# Patient Record
Sex: FEMALE | Race: WHITE | Employment: UNEMPLOYED | URBAN - METROPOLITAN AREA
[De-identification: names, ages, dates, MRNs, and addresses within clinical notes are randomized per-mention and may not be internally consistent; named-entity substitution may affect disease eponyms.]

---

## 2017-06-23 ENCOUNTER — DOCUMENTATION ONLY (OUTPATIENT)
Dept: SURGERY | Age: 57
End: 2017-06-23

## 2017-06-23 NOTE — PROGRESS NOTES
Per [facility] requirements;  E-mail and letter sent for follow up appointment. [facility] Loss [address] [facility]  [facility]      Dear Shira Vincent,  You have not followed up with your bariatric office since 1/29/15. Your health is our main concern. It is important for your health to have follow- up lab work and to see you surgeon at 3 months, 6 months and annually after your weight loss surgery. Additionally, the Department of bariatric Surgery at our hospital is a member of the [employer] [address] Surgical Quality Improvement Program ([facility] NSQIP). As a participant in this program, we gather information on the outcomes of our patients after surgery. Please call the office for a follow up appointment at [phone]. If you have moved out of the area or have changed surgeons please call us and let us know the name of your doctor. Your health and feedback are important to us. We greatly appreciate your response.        Thank you,  [facility] [address] [facility]

## 2017-12-27 ENCOUNTER — DOCUMENTATION ONLY (OUTPATIENT)
Dept: SURGERY | Age: 57
End: 2017-12-27

## 2017-12-27 NOTE — PROGRESS NOTES
Per [facility] requirements;  E-mail and letter sent for follow up appointment. [facility] Emily Foster [address]      Dear Anais Castelan,  Your health is our main concern. It is important for your health to have follow-up lab work and to see you surgeon at 3 months, 6 months and annually after your weight loss surgery. Additionally, the Department of bariatric Surgery at our hospital is a member of the [employer] [address] Surgical Quality Improvement Program ([facility] NSQIP). As a participant in this program, we gather information on the outcomes of our patients after surgery. Please call the office for a follow up appointment at [phone] with [facility] Thee HERNANDEZ PA-C. If you have moved out of the area or have changed surgeons please call us and let us know the name of your doctor. Your health and feedback are important to us. We greatly appreciate your response.        Thank you,  [facility] [address] [facility]

## 2019-03-14 ENCOUNTER — OFFICE VISIT (OUTPATIENT)
Dept: FAMILY MEDICINE CLINIC | Age: 59
End: 2019-03-14

## 2019-03-14 ENCOUNTER — HOSPITAL ENCOUNTER (OUTPATIENT)
Dept: LAB | Age: 59
Discharge: HOME | End: 2019-03-14
Payer: COMMERCIAL

## 2019-03-14 VITALS
DIASTOLIC BLOOD PRESSURE: 78 MMHG | HEART RATE: 74 BPM | HEIGHT: 70 IN | WEIGHT: 201 LBS | BODY MASS INDEX: 28.77 KG/M2 | OXYGEN SATURATION: 98 % | TEMPERATURE: 98.1 F | RESPIRATION RATE: 14 BRPM | SYSTOLIC BLOOD PRESSURE: 124 MMHG

## 2019-03-14 DIAGNOSIS — K21.9 GASTROESOPHAGEAL REFLUX DISEASE WITHOUT ESOPHAGITIS: ICD-10-CM

## 2019-03-14 DIAGNOSIS — I82.401 ACUTE DEEP VEIN THROMBOSIS (DVT) OF RIGHT LOWER EXTREMITY, UNSPECIFIED VEIN (HCC): Primary | ICD-10-CM

## 2019-03-14 DIAGNOSIS — H91.8X3 OTHER SPECIFIED HEARING LOSS OF BOTH EARS: ICD-10-CM

## 2019-03-14 DIAGNOSIS — E03.9 HYPOTHYROIDISM, UNSPECIFIED TYPE: ICD-10-CM

## 2019-03-14 DIAGNOSIS — E66.01 MORBID OBESITY (HCC): ICD-10-CM

## 2019-03-14 LAB
ALBUMIN SERPL-MCNC: 4.2 G/DL (ref 3.4–5)
ALBUMIN/GLOB SERPL: 1.4 {RATIO} (ref 0.8–1.7)
ALP SERPL-CCNC: 92 U/L (ref 45–117)
ALT SERPL-CCNC: 23 U/L (ref 13–56)
ANION GAP SERPL CALC-SCNC: 6 MMOL/L (ref 3–18)
AST SERPL-CCNC: 18 U/L (ref 15–37)
BASOPHILS # BLD: 0 K/UL (ref 0–0.1)
BASOPHILS NFR BLD: 1 % (ref 0–2)
BILIRUB SERPL-MCNC: 0.4 MG/DL (ref 0.2–1)
BUN SERPL-MCNC: 13 MG/DL (ref 7–18)
BUN/CREAT SERPL: 15 (ref 12–20)
CALCIUM SERPL-MCNC: 8.9 MG/DL (ref 8.5–10.1)
CHLORIDE SERPL-SCNC: 106 MMOL/L (ref 100–108)
CHOLEST SERPL-MCNC: 189 MG/DL
CO2 SERPL-SCNC: 28 MMOL/L (ref 21–32)
CREAT SERPL-MCNC: 0.86 MG/DL (ref 0.6–1.3)
DIFFERENTIAL METHOD BLD: ABNORMAL
EOSINOPHIL # BLD: 0.3 K/UL (ref 0–0.4)
EOSINOPHIL NFR BLD: 4 % (ref 0–5)
ERYTHROCYTE [DISTWIDTH] IN BLOOD BY AUTOMATED COUNT: 14.9 % (ref 11.6–14.5)
GLOBULIN SER CALC-MCNC: 3 G/DL (ref 2–4)
GLUCOSE SERPL-MCNC: 86 MG/DL (ref 74–99)
HCT VFR BLD AUTO: 45.8 % (ref 35–45)
HDLC SERPL-MCNC: 71 MG/DL (ref 40–60)
HDLC SERPL: 2.7 {RATIO} (ref 0–5)
HGB BLD-MCNC: 15.2 G/DL (ref 12–16)
LDLC SERPL CALC-MCNC: 101.2 MG/DL (ref 0–100)
LIPID PROFILE,FLP: ABNORMAL
LYMPHOCYTES # BLD: 2.4 K/UL (ref 0.9–3.6)
LYMPHOCYTES NFR BLD: 36 % (ref 21–52)
MCH RBC QN AUTO: 31.3 PG (ref 24–34)
MCHC RBC AUTO-ENTMCNC: 33.2 G/DL (ref 31–37)
MCV RBC AUTO: 94.2 FL (ref 74–97)
MONOCYTES # BLD: 0.5 K/UL (ref 0.05–1.2)
MONOCYTES NFR BLD: 7 % (ref 3–10)
NEUTS SEG # BLD: 3.5 K/UL (ref 1.8–8)
NEUTS SEG NFR BLD: 52 % (ref 40–73)
PLATELET # BLD AUTO: 211 K/UL (ref 135–420)
PMV BLD AUTO: 11.3 FL (ref 9.2–11.8)
POTASSIUM SERPL-SCNC: 4.4 MMOL/L (ref 3.5–5.5)
PROT SERPL-MCNC: 7.2 G/DL (ref 6.4–8.2)
RBC # BLD AUTO: 4.86 M/UL (ref 4.2–5.3)
SODIUM SERPL-SCNC: 140 MMOL/L (ref 136–145)
T4 FREE SERPL-MCNC: 0.9 NG/DL (ref 0.7–1.5)
TRIGL SERPL-MCNC: 84 MG/DL (ref ?–150)
TSH SERPL DL<=0.05 MIU/L-ACNC: 5.5 UIU/ML (ref 0.36–3.74)
VLDLC SERPL CALC-MCNC: 16.8 MG/DL
WBC # BLD AUTO: 6.7 K/UL (ref 4.6–13.2)

## 2019-03-14 PROCEDURE — 80061 LIPID PANEL: CPT

## 2019-03-14 PROCEDURE — 80053 COMPREHEN METABOLIC PANEL: CPT

## 2019-03-14 PROCEDURE — 85025 COMPLETE CBC W/AUTO DIFF WBC: CPT

## 2019-03-14 PROCEDURE — 84439 ASSAY OF FREE THYROXINE: CPT

## 2019-03-14 RX ORDER — METOPROLOL TARTRATE 25 MG/1
25 TABLET, FILM COATED ORAL
COMMUNITY
Start: 2016-12-19 | End: 2019-03-19 | Stop reason: SDUPTHER

## 2019-03-14 RX ORDER — ZOLPIDEM TARTRATE 10 MG/1
10 TABLET ORAL
COMMUNITY
End: 2019-04-05

## 2019-03-14 NOTE — PROGRESS NOTES
New patient here for medication refills she has been completely out of her medications for a while now, she state she also has issues with anxiety, has had some dizziness and c/o heart palpitations. Medication reconciliation has been completed with patient. Care team discussed/updated as well as pharmacy. Health Maintenance reviewed - Pending previous records review.

## 2019-03-14 NOTE — PATIENT INSTRUCTIONS
Deep Vein Thrombosis: Care Instructions  Your Care Instructions    A deep vein thrombosis (DVT) is a blood clot in certain veins of the legs, pelvis, or arms. Blood clots in these veins need to be treated because they can get bigger, break loose, and travel through the bloodstream to the lungs. A blood clot in a lung can be life-threatening. The doctor may have given you a blood thinner (anticoagulant). A blood thinner can stop the blood clot from growing larger and prevent new clots from forming. You will need to take a blood thinner for 3 to 6 months or longer. The doctor has checked you carefully, but problems can develop later. If you notice any problems or new symptoms, get medical treatment right away. Follow-up care is a key part of your treatment and safety. Be sure to make and go to all appointments, and call your doctor if you are having problems. It's also a good idea to know your test results and keep a list of the medicines you take. How can you care for yourself at home? · Take your medicines exactly as prescribed. Call your doctor if you think you are having a problem with your medicine. · If you are taking a blood thinner, be sure you get instructions about how to take your medicine safely. Blood thinners can cause serious bleeding problems. · Wear compression stockings if your doctor recommends them. These stockings are tighter at the feet than on the legs. They may reduce pain and swelling in your legs. But there are different types of stockings, and they need to fit right. So your doctor will recommend what you need. · When you sit, use a pillow to raise the arm or leg that has the blood clot. Try to keep it above the level of your heart. When should you call for help? Call 911 anytime you think you may need emergency care. For example, call if:    · You passed out (lost consciousness).     · You have symptoms of a blood clot in your lung (called a pulmonary embolism).  These include:  ? Sudden chest pain. ? Trouble breathing. ? Coughing up blood.    Call your doctor now or seek immediate medical care if:    · You have new or worse trouble breathing.     · You are dizzy or lightheaded, or you feel like you may faint.     · You have symptoms of a blood clot in your arm or leg. These may include:  ? Pain in the arm, calf, back of the knee, thigh, or groin. ? Redness and swelling in the arm, leg, or groin.    Watch closely for changes in your health, and be sure to contact your doctor if:    · You do not get better as expected. Where can you learn more? Go to http://brian-cindy.info/. Enter V044 in the search box to learn more about \"Deep Vein Thrombosis: Care Instructions. \"  Current as of: September 26, 2018  Content Version: 11.9  © 3779-9580 [employer]. Care instructions adapted under license by [employer] (which disclaims liability or warranty for this information). If you have questions about a medical condition or this instruction, always ask your healthcare professional. [address] any warranty or liability for your use of this information.

## 2019-03-14 NOTE — PROGRESS NOTES
Rosenda Vernon is a 63 y.o. female  presents for establish care. She has some hearing loss and wants to be checked    No Known Allergies  Outpatient Medications Marked as Taking for the 3/14/19 encounter (Office Visit) with Rebeca Beltran MD   Medication Sig Dispense Refill    thiamine HCl (VITAMIN B-1 PO) Take  by mouth.  cyanocobalamin (VITAMIN B-12) 500 mcg tablet Take 500 mcg by mouth daily.  multivitamin (ONE A DAY) tablet Take 1 Tab by mouth two (2) times a day.  calcium citrate-vitamin D3 (UPCAL D) 500 mg-500 unit /5 gram powd Take 500 mg by mouth three (3) times daily.  BIOTIN PO Take  by mouth.  Cholecalciferol, Vitamin D3, (VITAMIN D3) 1,000 unit chew Take 2,000 Units by mouth daily.        Patient Active Problem List   Diagnosis Code    Hypothyroidism E03.9    DJD (degenerative joint disease) M19.90    DVT (deep venous thrombosis) ([facility]) I82.409    Morbid obesity ([facility] [address] E66.01    HTN (hypertension) I10    FAINA (stress urinary incontinence, female) N39.3    GERD (gastroesophageal reflux disease) K21.9    CAMDEN on CPAP G47.33, Z99.89     Past Medical History:   Diagnosis Date    Arthritis     Chronic obstructive pulmonary disease (HCC)     hx pe    Coagulation disorder (HCC)     clotting disorder    Hx of colonic polyps     HX OTHER MEDICAL     pulmonary embolism x 2    HX OTHER MEDICAL     PCOS    HX OTHER MEDICAL     Heart monitor 1/2015     Hypertension     no longer on meds    Long term (current) use of anticoagulants     Osteopenia     PE (pulmonary thromboembolism) ([address]     Plantar fasciitis of left foot     Tendinopathy Oct 2013    Right insertional Achilles tendinopathy    Thromboembolus ([address]     blood clot in left leg then lung in 2007, then clot behind right knee 2009    Thromboembolus [facility])     also PE    Thyroid disease      Social History     Socioeconomic History    Marital status:      Spouse name: Not on file    Number of children: Not on file    Years of education: Not on file    Highest education level: Not on file   Tobacco Use    Smoking status: Current Every Day Smoker     Packs/day: 0.50     Years: 10.00     Pack years: 5.00     Types: Cigarettes     Last attempt to quit: 2013     Years since quittin.6    Smokeless tobacco: Never Used    Tobacco comment: vapor pen   Substance and Sexual Activity    Alcohol use: No     Alcohol/week: 2.0 oz     Types: 2 Glasses of wine, 2 Standard drinks or equivalent per week     Comment: once weekly    Drug use: No     Family History   Problem Relation Age of Onset    Diabetes Other     Hypertension Other     Heart Disease Other     Arthritis-osteo Other         Review of Systems   Constitutional: Negative for chills, fever, malaise/fatigue and weight loss. HENT: Positive for hearing loss. Eyes: Negative for blurred vision. Respiratory: Negative for shortness of breath and wheezing. Cardiovascular: Negative for chest pain. Gastrointestinal: Negative for nausea and vomiting. Musculoskeletal: Negative for myalgias. Skin: Negative for rash. Neurological: Negative for weakness. Psychiatric/Behavioral: Negative for depression, hallucinations, memory loss, substance abuse and suicidal ideas. The patient is not nervous/anxious and does not have insomnia. Vitals:    19 1415   BP: 124/78   Pulse: 74   Resp: 14   Temp: 98.1 °F (36.7 °C)   TempSrc: Oral   SpO2: 98%   Weight: 201 lb (91.2 kg)   Height: 5' 10\" (1.778 m)   PainSc:   2   PainLoc: Neck   LMP: 2013       Physical Exam   Constitutional: She is oriented to person, place, and time and well-developed, well-nourished, and in no distress. Eyes: Conjunctivae are normal. Pupils are equal, round, and reactive to light. Neck: Normal range of motion. Neck supple. No thyromegaly present. Cardiovascular: Normal rate, regular rhythm and normal heart sounds.    Pulmonary/Chest: Effort normal and breath sounds normal.   Musculoskeletal: Normal range of motion. Neurological: She is alert and oriented to person, place, and time. Skin: Skin is warm and dry. Psychiatric: Mood, memory, affect and judgment normal.   Nursing note and vitals reviewed. Assessment/Plan      ICD-10-CM ICD-9-CM    1. Acute deep vein thrombosis (DVT) of right lower extremity, unspecified vein ([facility]) I82.401 453.40 rivaroxaban (XARELTO) 20 mg tab tablet   2. Hypothyroidism, unspecified type E03.9 244.9 TSH AND FREE T4      LIPID PANEL   3. Morbid obesity ([facility]) E66.01 278.01    4. Gastroesophageal reflux disease without esophagitis T89.4 355.56 METABOLIC PANEL, COMPREHENSIVE      CBC WITH AUTOMATED DIFF   5. Other specified hearing loss of both ears H91.8X3 389.8 REFERRAL TO AUDIOLOGY     I have discussed the diagnosis with the patient and the intended plan of care as seen in the above orders. The patient has received an after-visit summary and questions were answered concerning future plans. I have discussed medication, side effects, and warnings with the patient in detail. The patient verbalized understanding and is in agreement with the plan of care. The patient will contact the office with any additional concerns. Follow-up Disposition:  Return in about 3 months (around 6/14/2019).   lab results and schedule of future lab studies reviewed with patient    Iram Pierce MD

## 2019-03-19 DIAGNOSIS — F51.01 PRIMARY INSOMNIA: ICD-10-CM

## 2019-03-19 DIAGNOSIS — F41.9 ANXIETY: Primary | ICD-10-CM

## 2019-03-19 RX ORDER — ZOLPIDEM TARTRATE 10 MG/1
10 TABLET ORAL
OUTPATIENT
Start: 2019-03-19

## 2019-03-19 RX ORDER — LEVOTHYROXINE SODIUM 137 UG/1
137 TABLET ORAL
Qty: 30 TAB | Refills: 2 | Status: SHIPPED | OUTPATIENT
Start: 2019-03-19 | End: 2019-06-10 | Stop reason: SDUPTHER

## 2019-03-19 RX ORDER — CITALOPRAM 20 MG/1
20 TABLET, FILM COATED ORAL DAILY
Qty: 30 TAB | Refills: 3 | Status: SHIPPED | OUTPATIENT
Start: 2019-03-19 | End: 2019-05-17 | Stop reason: SINTOL

## 2019-03-19 RX ORDER — METOPROLOL TARTRATE 25 MG/1
25 TABLET, FILM COATED ORAL 2 TIMES DAILY
Qty: 60 TAB | Refills: 2 | Status: SHIPPED | OUTPATIENT
Start: 2019-03-19 | End: 2019-04-05 | Stop reason: SINTOL

## 2019-03-19 NOTE — TELEPHONE ENCOUNTER
Patient is asking for refills on her medications which have been pended, she was seen in the office on 3/14/19 as a new patient. Please advise.

## 2019-03-19 NOTE — TELEPHONE ENCOUNTER
Mrs. Price Ao called requesting refills on her current medications. She states she discussed these refills while here for her new pt appointment. She asked Dr. Issac Baugh to send them in. 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 She also wants her lab results, she needs to be on thyroid medicine, but Dr. Issac Baugh has to review the lab results first.     Her xaralto needs prior authorization (see other msg with this encounter). If that can't be done in a timely manner, then she needs her coumadin sent in.  called as well, we were unable to discuss anything with him because he wasn't on the paperwork. Patient made aware that  has to be added.

## 2019-03-19 NOTE — TELEPHONE ENCOUNTER
Fax received from [address] [place] [address] meds stating prior [place] Door is required for the Fa[place]. Submit a PA request  1. Go to key. [email or web address] and click \"Enter a Key\"  2. Patient last name: Silvana Murphy      : 1960      Key: [id number]  3.  Click \"start a PA\", complete the form, and \"send to plan\"

## 2019-03-19 NOTE — TELEPHONE ENCOUNTER
Called to let pt know that Dr. Chris Jones [place] [address] for anxiety. He also filled her synthroid and metoprolol. PA was submitted and approved for Xarelto. He did not fill Ambien. He does not fill this medication and has done a referral to sleep medicine. I was unable to reach pt at either number on file.

## 2019-03-19 NOTE — TELEPHONE ENCOUNTER
Franco Robles Key: [id number] - PA Case ID: [id number] - Rx #: [id number] Need help? Outcome  Approved today  [id number];    Status:Approved; Review     Type:Prior Auth; Coverage Start     Date:02/17/2019; Coverage End Date:03/18/2020;

## 2019-03-26 ENCOUNTER — OFFICE VISIT (OUTPATIENT)
Dept: FAMILY MEDICINE CLINIC | Age: 59
End: 2019-03-26

## 2019-03-26 VITALS
DIASTOLIC BLOOD PRESSURE: 72 MMHG | RESPIRATION RATE: 12 BRPM | HEART RATE: 60 BPM | OXYGEN SATURATION: 99 % | SYSTOLIC BLOOD PRESSURE: 116 MMHG | HEIGHT: 70 IN | BODY MASS INDEX: 29.06 KG/M2 | WEIGHT: 203 LBS

## 2019-03-26 DIAGNOSIS — R00.1 BRADYCARDIA: Primary | ICD-10-CM

## 2019-03-26 NOTE — PATIENT INSTRUCTIONS
Bradycardia: Care Instructions  Your Care Instructions    Bradycardia is a slow heart rate. If your heart beats too slowly, it can't supply your body with enough blood. This can make you weak or dizzy. Or it may make you pass out. Sometimes medicine can cause this problem. If this happens, your doctor may have you adjust one of your medicines. If a medicine is not the problem, your doctor may recommend a pacemaker. It is important to treat bradycardia so that you don't get more serious health problems. Your doctor will want to see you on a routine schedule to make sure that your heartbeat is normal.  Follow-up care is a key part of your treatment and safety. Be sure to make and go to all appointments, and call your doctor if you are having problems. It's also a good idea to know your test results and keep a list of the medicines you take. How can you care for yourself at home? · Take your medicines exactly as prescribed. Call your doctor if you think you are having a problem with your medicine. If your bradycardia is caused by another disease, your doctor will try to treat the disease. If it is caused by heart medicines, he or she will adjust your medicines. · Make lifestyle changes to improve your heart health. ? Get regular exercise. Try for 30 minutes on most days of the week. If you do not have other heart problems, you likely do not have limits on the type or level of activity that you can do. You may want to walk, swim, bike, or do other activities. Ask your doctor what level of exercise is safe for you. ? To control your cholesterol, avoid foods with a lot of fat, saturated fat, or sodium. Try to eat more fiber. And if your doctor says it's okay, get some exercise on most days. ? Do not smoke. Smoking can make your heart condition worse. If you need help quitting, talk to your doctor about stop-smoking programs and medicines. These can increase your chances of quitting for good. ?  Limit alcohol to 2 drinks a day for men and 1 drink a day for women. Too much alcohol can cause health problems. Pacemaker  If you have a pacemaker, you will get more specific information about it. Be sure to:  · Check your pulse as your doctor tells you. · Have your pacemaker checked as often as your doctor recommends. You may be able to do this over the phone or computer. · Avoid strong magnetic or electrical fields. These include MRIs, welding equipment, and generators. · You will be checked several times right after you get your pacemaker and when it is time to have the battery changed. Batteries last for 5 to 15 years. · You can talk on a cell phone. But keep it 6 inches away from your pacemaker. · Microwaves, TVs, radios, and kitchen and bathroom appliances won't harm you. When should you call for help? Call 911 anytime you think you may need emergency care. For example, call if:    · You have symptoms of sudden heart failure. These may include:  ? Severe trouble breathing. ? A fast or irregular heartbeat. ? Coughing up pink, foamy mucus. ? You passed out.     · You have symptoms of a stroke. These may include:  ? Sudden numbness, tingling, weakness, or loss of movement in your face, arm, or leg, especially on only one side of your body. ? Sudden vision changes. ? Sudden trouble speaking. ? Sudden confusion or trouble understanding simple statements. ? Sudden problems with walking or balance. ? A sudden, severe headache that is different from past headaches.    Call your doctor now or seek immediate medical care if:    · You have new or changed symptoms of heart failure, such as:  ? New or increased shortness of breath. ? New or worse swelling in your legs, ankles, or feet. ? Sudden weight gain, such as more than 2 to 3 pounds in a day or 5 pounds in a week. (Your doctor may suggest a different range of weight gain.)  ? Feeling dizzy or lightheaded or like you may faint. ?  Feeling so tired or weak that you cannot do your usual activities. ? Not sleeping well. Shortness of breath wakes you at night. You need extra pillows to prop yourself up to breathe easier.    Watch closely for changes in your health, and be sure to contact your doctor if:    · You do not get better as expected. Where can you learn more? Go to http://brian-cindy.info/. Enter G945 in the search box to learn more about \"Bradycardia: Care Instructions. \"  Current as of: July 22, 2018  Content Version: 11.9  © 2955-8212 [employer]. Care instructions adapted under license by [employer] (which disclaims liability or warranty for this information). If you have questions about a medical condition or this instruction, always ask your healthcare professional. [address] any warranty or liability for your use of this information.

## 2019-03-26 NOTE — PROGRESS NOTES
Marissa Almonte is a 63 y.o. female  presents for slow heart rate. She was seen by ER. She has assoc dizziness. She had sxs for 2 days. She  States sxs are getting worse. No Known Allergies  Outpatient Medications Marked as Taking for the 3/26/19 encounter (Office Visit) with Evita Waldrop MD   Medication Sig Dispense Refill    levothyroxine (SYNTHROID) 137 mcg tablet Take 137 mcg by mouth Daily (before breakfast). 30 Tab 2    metoprolol tartrate (LOPRESSOR) 25 mg tablet Take 1 Tab by mouth two (2) times a day. 60 Tab 2    citalopram (CELEXA) 20 mg tablet Take 1 Tab by mouth daily. 30 Tab 3    zolpidem (AMBIEN) 10 mg tablet Take 10 mg by mouth.  thiamine HCl (VITAMIN B-1 PO) Take  by mouth.  rivaroxaban (XARELTO) 20 mg tab tablet Take 1 Tab by mouth daily. To follow up with Dr. Gomez Clubs regarding when to stop medication and start Lovenox. 30 Tab 1    cyanocobalamin (VITAMIN B-12) 500 mcg tablet Take 500 mcg by mouth daily.  multivitamin (ONE A DAY) tablet Take 1 Tab by mouth two (2) times a day.  calcium citrate-vitamin D3 (UPCAL D) 500 mg-500 unit /5 gram powd Take 500 mg by mouth three (3) times daily.  BIOTIN PO Take  by mouth.  Cholecalciferol, Vitamin D3, (VITAMIN D3) 1,000 unit chew Take 2,000 Units by mouth daily.        Patient Active Problem List   Diagnosis Code    Hypothyroidism E03.9    DJD (degenerative joint disease) M19.90    DVT (deep venous thrombosis) ([facility]) I82.409    Morbid obesity ([facility] [address] E66.01    HTN (hypertension) I10    FAINA (stress urinary incontinence, female) N39.3    GERD (gastroesophageal reflux disease) K21.9    CAMDEN on CPAP G47.33, Z99.89     Past Medical History:   Diagnosis Date    Arthritis     Chronic obstructive pulmonary disease (HCC)     hx pe    Coagulation disorder (HCC)     clotting disorder    Hx of colonic polyps     HX OTHER MEDICAL     pulmonary embolism x 2    HX OTHER MEDICAL     PCOS    HX OTHER MEDICAL     Heart monitor 2015     Hypertension     no longer on meds    Long term (current) use of anticoagulants     Osteopenia     PE (pulmonary thromboembolism) (HCC)     Plantar fasciitis of left foot     Tendinopathy Oct 2013    Right insertional Achilles tendinopathy    Thromboembolus ([address]     blood clot in left leg then lung in , then clot behind right knee     Thromboembolus [facility])     also PE    Thyroid disease      Social History     Socioeconomic History    Marital status:      Spouse name: Not on file    Number of children: Not on file    Years of education: Not on file    Highest education level: Not on file   Tobacco Use    Smoking status: Current Every Day Smoker     Packs/day: 0.50     Years: 10.00     Pack years: 5.00     Types: Cigarettes     Last attempt to quit: 2013     Years since quittin.6    Smokeless tobacco: Never Used    Tobacco comment: vapor pen   Substance and Sexual Activity    Alcohol use: No     Alcohol/week: 2.0 oz     Types: 2 Glasses of wine, 2 Standard drinks or equivalent per week     Comment: once weekly    Drug use: No     Family History   Problem Relation Age of Onset    Diabetes Other     Hypertension Other     Heart Disease Other     Arthritis-osteo Other         Review of Systems   Constitutional: Negative for chills, fever, malaise/fatigue and weight loss. Eyes: Negative for blurred vision. Respiratory: Negative for shortness of breath and wheezing. Cardiovascular: Negative for chest pain, palpitations, orthopnea, claudication, leg swelling and PND. Gastrointestinal: Negative for nausea and vomiting. Musculoskeletal: Negative for myalgias. Skin: Negative for rash. Neurological: Negative for weakness. Psychiatric/Behavioral: Negative.         Vitals:    19 0824   BP: 116/72   Pulse: 60   Resp: 12   SpO2: 99%   Weight: 203 lb (92.1 kg)   Height: 5' 10\" (1.778 m)   PainSc:   2   PainLoc: Leg   LMP: 2013 Physical Exam   Constitutional: She is oriented to person, place, and time and well-developed, well-nourished, and in no distress. Neck: Normal range of motion. Neck supple. No thyromegaly present. Cardiovascular: Normal rate, regular rhythm, normal heart sounds and intact distal pulses. Pulmonary/Chest: Effort normal and breath sounds normal.   Musculoskeletal: Normal range of motion. Neurological: She is alert and oriented to person, place, and time. Skin: Skin is warm and dry. Nursing note and vitals reviewed. Assessment/Plan      ICD-10-CM ICD-9-CM    1. Bradycardia R00.1 427.89 REFERRAL TO CARDIOLOGY     I have discussed the diagnosis with the patient and the intended plan of care as seen in the above orders. The patient has received an after-visit summary and questions were answered concerning future plans. I have discussed medication, side effects, and warnings with the patient in detail. The patient verbalized understanding and is in agreement with the plan of care. The patient will contact the office with any additional concerns.         lab results and schedule of future lab studies reviewed with patient    Teresa Hunt MD

## 2019-03-26 NOTE — PROGRESS NOTES
Pt in office for referral to cardiology. Was told by the [facility] ER yesterday she would need a referral. She had dizziness, nausea, feeling like she was going to black out, heaviness in legs. States she had 'low irregular pulse'. 1. Have you been to the ER, urgent care clinic since your last visit? Hospitalized since your last visit?see notes in CC    2. Have you seen or consulted any other health care providers outside of the [address] [place] [address] since your last visit? Include any pap smears or colon screening.  No

## 2019-03-26 NOTE — LETTER
NOTIFICATION RETURN TO WORK  
 
3/26/2019 8:50 AM 
 
Ms. Chante Santiago [address] [place] [id number] To Whom It May Concern: 
 
Chante Santiago is currently under the care of [address]. She will be out of work until she is evaluated and treated by cardiologist. 
 
If there are questions or concerns please have the patient contact our office. Sincerely, Jie Blanco MD

## 2019-04-05 ENCOUNTER — OFFICE VISIT (OUTPATIENT)
Dept: CARDIOLOGY CLINIC | Age: 59
End: 2019-04-05

## 2019-04-05 VITALS
SYSTOLIC BLOOD PRESSURE: 103 MMHG | HEART RATE: 50 BPM | HEIGHT: 70 IN | DIASTOLIC BLOOD PRESSURE: 60 MMHG | BODY MASS INDEX: 28.77 KG/M2 | WEIGHT: 201 LBS

## 2019-04-05 DIAGNOSIS — Z86.718 HISTORY OF DVT OF LOWER EXTREMITY: ICD-10-CM

## 2019-04-05 DIAGNOSIS — R00.2 PALPITATIONS: ICD-10-CM

## 2019-04-05 DIAGNOSIS — Z86.79 HISTORY OF HYPERTENSION: ICD-10-CM

## 2019-04-05 DIAGNOSIS — Z86.711 HISTORY OF PULMONARY EMBOLISM: ICD-10-CM

## 2019-04-05 DIAGNOSIS — Z98.84 HISTORY OF GASTRIC BYPASS: ICD-10-CM

## 2019-04-05 DIAGNOSIS — R00.1 BRADYCARDIA: Primary | ICD-10-CM

## 2019-04-05 NOTE — PROGRESS NOTES
HISTORY OF PRESENT ILLNESS  April Parikh is a 63 y.o. female. 4/19 history of syncope about 2014 approximately. It happened after gastric bypass surgery when she had lost a lot of weight. 4/19 history of CP lower sternal, radiates to the back. No relation to activity or food. Lasting many hours. No radiation no associated nausea vomiting diaphoresis. Feels harder to breathe during the episode. No medicines. Spontaneous relief in few hours. New Patient   The history is provided by the patient and medical records. Associated symptoms include chest pain. Pertinent negatives include no headaches and no shortness of breath. Slow Heart Rate   The history is provided by the patient and medical records. This is a new problem. The current episode started more than 1 week ago. Associated symptoms include chest pain. Pertinent negatives include no headaches and no shortness of breath. Dizziness   The history is provided by the patient. This is a new problem. The current episode started more than 1 week ago (yrs; see comments also). The problem occurs rarely (once in a few months). The problem has been gradually worsening (few months- once a month or more). Associated symptoms include chest pain. Pertinent negatives include no headaches and no shortness of breath. Exacerbated by: driving. The symptoms are relieved by rest (pull over if driving). Palpitations    The history is provided by the patient. This is a new problem. The current episode started more than 1 week ago (few weaks, feels a hard beat but the rate is still 50-60.). Associated symptoms include chest pain and dizziness. Pertinent negatives include no fever, no malaise/fatigue, no claudication, no orthopnea, no PND, no nausea, no vomiting, no headaches, no cough and no shortness of breath. Review of Systems   Constitutional: Negative for chills, fever, malaise/fatigue and weight loss. HENT: Negative for nosebleeds.     Eyes: Negative for discharge. Respiratory: Negative for cough, shortness of breath and wheezing. Cardiovascular: Positive for chest pain and palpitations. Negative for orthopnea, claudication, leg swelling and PND. Gastrointestinal: Negative for diarrhea, nausea and vomiting. Genitourinary: Negative for dysuria and hematuria. Musculoskeletal: Negative for joint pain. Skin: Negative for rash. Neurological: Positive for dizziness. Negative for seizures, loss of consciousness and headaches. Endo/Heme/Allergies: Negative for polydipsia. Does not bruise/bleed easily. Psychiatric/Behavioral: Negative for depression and substance abuse. The patient does not have insomnia.       No Known Allergies    Past Medical History:   Diagnosis Date    Arthritis     Chronic obstructive pulmonary disease (HCC)     hx pe    Coagulation disorder (HCC)     clotting disorder    Hx of colonic polyps     HX OTHER MEDICAL     pulmonary embolism x 2    HX OTHER MEDICAL     PCOS    HX OTHER MEDICAL     Heart monitor 2015     Hypertension     no longer on meds since gastric bypass    Long term (current) use of anticoagulants     Osteopenia     PE (pulmonary thromboembolism) ([address]     Plantar fasciitis of left foot     Tendinopathy Oct 2013    Right insertional Achilles tendinopathy    Thromboembolus ([address]     blood clot in left leg then lung in , then clot behind right knee 2009    Thromboembolus ([address]     also PE    Thyroid disease        Family History   Problem Relation Age of Onset    Diabetes Other     Hypertension Other     Heart Disease Other     Arthritis-osteo Other     Stroke Mother 66    Stroke Father 36    Heart Attack Neg Hx        Social History     Tobacco Use    Smoking status: Current Every Day Smoker     Packs/day: 1.50     Years: 10.00     Pack years: 15.00     Types: Cigarettes     Last attempt to quit: 2013     Years since quittin.6    Smokeless tobacco: Never Used    Tobacco comment: vapor pen   Substance Use Topics    Alcohol use: No     Alcohol/week: 2.0 oz     Types: 2 Glasses of wine, 2 Standard drinks or equivalent per week     Comment: once weekly; quit 2018    Drug use: No        Current Outpatient Medications   Medication Sig    levothyroxine (SYNTHROID) 137 mcg tablet Take 137 mcg by mouth Daily (before breakfast).  citalopram (CELEXA) 20 mg tablet Take 1 Tab by mouth daily.  thiamine HCl (VITAMIN B-1 PO) Take  by mouth.  rivaroxaban (XARELTO) 20 mg tab tablet Take 1 Tab by mouth daily. To follow up with Dr. Heather Almendarez regarding when to stop medication and start Lovenox.  cyanocobalamin (VITAMIN B-12) 500 mcg tablet Take 500 mcg by mouth daily.  multivitamin (ONE A DAY) tablet Take 1 Tab by mouth two (2) times a day.  calcium citrate-vitamin D3 (UPCAL D) 500 mg-500 unit /5 gram powd Take 500 mg by mouth three (3) times daily.  BIOTIN PO Take  by mouth.  Cholecalciferol, Vitamin D3, (VITAMIN D3) 1,000 unit chew Take 2,000 Units by mouth daily. No current facility-administered medications for this visit. Past Surgical History:   Procedure Laterality Date    HX DILATION AND CURETTAGE      ablation    HX DILATION AND CURETTAGE      age 17    HX GASTRIC BYPASS  1/15/2014    HX TUBAL LIGATION  1996       Visit Vitals  /60   Pulse (!) 50   Ht 5' 10\" (1.778 m)   Wt 91.2 kg (201 lb)   LMP 12/31/2013   BMI 28.84 kg/m²       Diagnostic Studies:  I have reviewed the relevant tests done on the patient and show as follows  EKG tracings reviewed by me today. EKG Results     None        XR Results (most recent):  Results from Hospital Encounter encounter on 12/09/13   XR CHEST PA LAT    Narrative History: Preop    PA AND LATERAL CHEST 2 VIEWS    CPT: 65552    The heart and lungs and bony structures appear unremarkable for a patient of  this age.       Impression IMPRESSION:    Normal chest.                      No flowsheet data found.    Ms. Annabelle Perdomo has a reminder for a \"due or due soon\" health maintenance. I have asked that she contact her primary care provider for follow-up on this health maintenance. Physical Exam   Constitutional: She is oriented to person, place, and time. She appears well-developed and well-nourished. No distress. Tall stature   HENT:   Head: Normocephalic and atraumatic. Mouth/Throat: Normal dentition. Eyes: Right eye exhibits no discharge. Left eye exhibits no discharge. No scleral icterus. Neck: Neck supple. No JVD present. Carotid bruit is not present. No thyromegaly present. Cardiovascular: Normal rate, regular rhythm, S1 normal, S2 normal, normal heart sounds and intact distal pulses. Exam reveals no gallop and no friction rub. No murmur heard. Pulmonary/Chest: Effort normal and breath sounds normal. She has no wheezes. She has no rales. Abdominal: Soft. She exhibits no mass. There is no tenderness. Musculoskeletal: She exhibits no edema. Lymphadenopathy:        Right cervical: No superficial cervical adenopathy present. Left cervical: No superficial cervical adenopathy present. Neurological: She is alert and oriented to person, place, and time. Skin: Skin is warm and dry. No rash noted. Psychiatric: She has a normal mood and affect. Her behavior is normal.       ASSESSMENT and PLAN    Seen for bradycardia and dizziness. Discontinue Lopressor. Warned patient that she can get more palpitations and report to me if it happens. If she continues to feel dizzy, she should call me back and we will have to do a tilt table test as well. Check echo to rule out any pulmonary hypertension due to history of pulmonary embolism even though recent CTA chest was negative. Diagnoses and all orders for this visit:    1. Bradycardia    2. History of pulmonary embolism  Comments:  2008 approx  Orders:  -     ECHO ADULT COMPLETE; Future    3.  History of DVT of lower extremity  Comments:  2010 approx    4. Palpitations  -     ECHO ADULT COMPLETE; Future    5. History of gastric bypass    6. History of hypertension  Comments:  Resolved since weight loss due to gastric bypass        Pertinent laboratory and test data reviewed and discussed with patient.   See patient instructions also for other medical advice given    Medications Discontinued During This Encounter   Medication Reason    zolpidem (AMBIEN) 10 mg tablet     metoprolol tartrate (LOPRESSOR) 25 mg tablet Side Effects       Follow-up and Dispositions    · Return in about 6 weeks (around 5/17/2019), or if symptoms worsen or fail to improve, for post test.

## 2019-04-05 NOTE — PATIENT INSTRUCTIONS
Medications Discontinued During This Encounter   Medication Reason    zolpidem (AMBIEN) 10 mg tablet     metoprolol tartrate (LOPRESSOR) 25 mg tablet Side Effects     Plenty of salt and water intake. Fainting: Care Instructions  Your Care Instructions    When you faint, or pass out, you lose consciousness for a short time. A brief drop in blood flow to the brain often causes it. When you fall or lie down, more blood flows to your brain and you regain consciousness. Emotional stress, pain, or overheating--especially if you have been standing--can make you faint. In these cases, fainting is usually not serious. But fainting can be a sign of a more serious problem. Your doctor may want you to have more tests to rule out other causes. The treatment you need depends on the reason why you fainted. The doctor has checked you carefully, but problems can develop later. If you notice any problems or new symptoms, get medical treatment right away. Follow-up care is a key part of your treatment and safety. Be sure to make and go to all appointments, and call your doctor if you are having problems. It's also a good idea to know your test results and keep a list of the medicines you take. How can you care for yourself at home? · Drink plenty of fluids to prevent dehydration. If you have kidney, heart, or liver disease and have to limit fluids, talk with your doctor before you increase your fluid intake. When should you call for help? Call 911 anytime you think you may need emergency care. For example, call if:    · You have symptoms of a heart problem. These may include:  ? Chest pain or pressure. ? Severe trouble breathing. ? A fast or irregular heartbeat. ? Lightheadedness or sudden weakness. ? Coughing up pink, foamy mucus. ? Passing out. After you call 911, the  may tell you to chew 1 adult-strength or 2 to 4 low-dose aspirin. Wait for an ambulance.  Do not try to drive yourself.     · You have symptoms of a stroke. These may include:  ? Sudden numbness, tingling, weakness, or loss of movement in your face, arm, or leg, especially on only one side of your body. ? Sudden vision changes. ? Sudden trouble speaking. ? Sudden confusion or trouble understanding simple statements. ? Sudden problems with walking or balance. ? A sudden, severe headache that is different from past headaches.     · You passed out (lost consciousness) again.    Watch closely for changes in your health, and be sure to contact your doctor if:    · You do not get better as expected. Where can you learn more? Go to http://brian-cindy.info/. Enter K183 in the search box to learn more about \"Fainting: Care Instructions. \"  Current as of: September 23, 2018  Content Version: 11.9  © 6993-2026 [employer]. Care instructions adapted under license by [employer] (which disclaims liability or warranty for this information). If you have questions about a medical condition or this instruction, always ask your healthcare professional. [place] [address] any warranty or liability for your use of this information.

## 2019-04-08 ENCOUNTER — OFFICE VISIT (OUTPATIENT)
Dept: CARDIOLOGY CLINIC | Age: 59
End: 2019-04-08

## 2019-04-08 VITALS
BODY MASS INDEX: 28.77 KG/M2 | HEIGHT: 70 IN | SYSTOLIC BLOOD PRESSURE: 99 MMHG | HEART RATE: 55 BPM | DIASTOLIC BLOOD PRESSURE: 63 MMHG | WEIGHT: 201 LBS

## 2019-04-08 DIAGNOSIS — Z86.711 HISTORY OF PULMONARY EMBOLISM: ICD-10-CM

## 2019-04-08 DIAGNOSIS — Z86.718 HISTORY OF DVT OF LOWER EXTREMITY: ICD-10-CM

## 2019-04-08 DIAGNOSIS — I25.10 CORONARY ARTERY CALCIFICATION OF NATIVE ARTERY: ICD-10-CM

## 2019-04-08 DIAGNOSIS — I95.0 IDIOPATHIC HYPOTENSION: Primary | ICD-10-CM

## 2019-04-08 DIAGNOSIS — I25.84 CORONARY ARTERY CALCIFICATION OF NATIVE ARTERY: ICD-10-CM

## 2019-04-08 DIAGNOSIS — Z98.84 HISTORY OF GASTRIC BYPASS: ICD-10-CM

## 2019-04-08 RX ORDER — MIDODRINE HYDROCHLORIDE 5 MG/1
5 TABLET ORAL
Qty: 90 TAB | Refills: 6 | Status: SHIPPED | OUTPATIENT
Start: 2019-04-08 | End: 2019-05-08

## 2019-04-08 NOTE — LETTER
NOTIFICATION OF RETURN TO WORK / SCHOOL 
 
4/8/2019 9:52 AM 
 
Ms. Susana Morgan [address] [place] [id number] [facility] To Whom It May Concern: 
 
Susana Morgan was under the care of [address] from 4/8/19 to 4/8/19. 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 She will follow-up next in about 4 weeks If there are questions or concerns please have the patient contact our office. Sincerely, Signed By: Esperanza Salgado. Shruthi Abraham MD  
 April 8, 2019 Lucretia Horan MD

## 2019-04-08 NOTE — PROGRESS NOTES
HISTORY OF PRESENT ILLNESS  Kevin Rick is a 63 y.o. female. 4/19 history of syncope about 2014 approximately. It happened after gastric bypass surgery when she had lost a lot of weight. 4/19 history of CP lower sternal, radiates to the back. No relation to activity or food. Lasting many hours. No radiation no associated nausea vomiting diaphoresis. Feels harder to breathe during the episode. No medicines. Spontaneous relief in few hours. New Patient   The history is provided by the patient and medical records. Associated symptoms include chest pain. Pertinent negatives include no headaches and no shortness of breath. Slow Heart Rate   The history is provided by the patient and medical records. This is a new problem. The current episode started more than 1 week ago. Associated symptoms include chest pain. Pertinent negatives include no headaches and no shortness of breath. Dizziness   The history is provided by the patient. This is a new problem. The current episode started more than 1 week ago (yrs; see comments also). The problem occurs rarely (once in a few months). The problem has been gradually worsening (few months- once a month or more). Associated symptoms include chest pain. Pertinent negatives include no headaches and no shortness of breath. Exacerbated by: driving. The symptoms are relieved by rest (pull over if driving). Palpitations    The history is provided by the patient. This is a new problem. The current episode started more than 1 week ago (few weaks, feels a hard beat but the rate is still 50-60.). Associated symptoms include chest pain and dizziness. Pertinent negatives include no fever, no malaise/fatigue, no claudication, no orthopnea, no PND, no nausea, no vomiting, no headaches, no cough and no shortness of breath. Review of Systems   Constitutional: Negative for chills, fever, malaise/fatigue and weight loss. HENT: Negative for nosebleeds.     Eyes: Negative for discharge. Respiratory: Negative for cough, shortness of breath and wheezing. Cardiovascular: Positive for chest pain and palpitations. Negative for orthopnea, claudication, leg swelling and PND. Gastrointestinal: Negative for diarrhea, nausea and vomiting. Genitourinary: Negative for dysuria and hematuria. Musculoskeletal: Negative for joint pain. Skin: Negative for rash. Neurological: Positive for dizziness. Negative for seizures, loss of consciousness and headaches. Endo/Heme/Allergies: Negative for polydipsia. Does not bruise/bleed easily. Psychiatric/Behavioral: Negative for depression and substance abuse. The patient does not have insomnia.       No Known Allergies    Past Medical History:   Diagnosis Date    Arthritis     Chronic obstructive pulmonary disease (HCC)     hx pe    Coagulation disorder (HCC)     clotting disorder    Hx of colonic polyps     HX OTHER MEDICAL     pulmonary embolism x 2    HX OTHER MEDICAL     PCOS    HX OTHER MEDICAL     Heart monitor 2015     Hypertension     no longer on meds since gastric bypass    Long term (current) use of anticoagulants     Osteopenia     PE (pulmonary thromboembolism) ([address]     Plantar fasciitis of left foot     Tendinopathy Oct 2013    Right insertional Achilles tendinopathy    Thromboembolus ([address]     blood clot in left leg then lung in , then clot behind right knee 2009    Thromboembolus ([address]     also PE    Thyroid disease        Family History   Problem Relation Age of Onset    Diabetes Other     Hypertension Other     Heart Disease Other     Arthritis-osteo Other     Stroke Mother 66    Stroke Father 36    Heart Attack Neg Hx        Social History     Tobacco Use    Smoking status: Current Every Day Smoker     Packs/day: 1.50     Years: 10.00     Pack years: 15.00     Types: Cigarettes     Last attempt to quit: 2013     Years since quittin.6    Smokeless tobacco: Never Used    Tobacco comment: vapor pen   Substance Use Topics    Alcohol use: No     Alcohol/week: 2.0 oz     Types: 2 Glasses of wine, 2 Standard drinks or equivalent per week     Comment: once weekly; quit 2018    Drug use: No        Current Outpatient Medications   Medication Sig    midodrine (PROAMITINE) 5 mg tablet Take 1 Tab by mouth three (3) times daily (with meals) for 30 days.  levothyroxine (SYNTHROID) 137 mcg tablet Take 137 mcg by mouth Daily (before breakfast).  citalopram (CELEXA) 20 mg tablet Take 1 Tab by mouth daily.  thiamine HCl (VITAMIN B-1 PO) Take  by mouth.  rivaroxaban (XARELTO) 20 mg tab tablet Take 1 Tab by mouth daily. To follow up with Dr. Fe Lanza regarding when to stop medication and start Lovenox.  cyanocobalamin (VITAMIN B-12) 500 mcg tablet Take 500 mcg by mouth daily.  multivitamin (ONE A DAY) tablet Take 1 Tab by mouth two (2) times a day.  calcium citrate-vitamin D3 (UPCAL D) 500 mg-500 unit /5 gram powd Take 500 mg by mouth three (3) times daily.  BIOTIN PO Take  by mouth.  Cholecalciferol, Vitamin D3, (VITAMIN D3) 1,000 unit chew Take 2,000 Units by mouth daily. No current facility-administered medications for this visit. Past Surgical History:   Procedure Laterality Date    HX DILATION AND CURETTAGE      ablation    HX DILATION AND CURETTAGE      age 17    HX GASTRIC BYPASS  1/15/2014    HX TUBAL LIGATION  1996       Visit Vitals  BP 99/63   Pulse (!) 55   Ht 5' 10\" (1.778 m)   Wt 91.2 kg (201 lb)   LMP 12/31/2013   BMI 28.84 kg/m²       Diagnostic Studies:  I have reviewed the relevant tests done on the patient and show as follows  EKG tracings reviewed by me today.     EKG Results     None        XR Results (most recent):  Results from Hospital Encounter encounter on 12/09/13   XR CHEST PA LAT    Narrative History: Preop    PA AND LATERAL CHEST 2 VIEWS    CPT: 35685    The heart and lungs and bony structures appear unremarkable for a patient of  this age. Impression IMPRESSION:    Normal chest.                      No flowsheet data found. Ms. Delia Greer has a reminder for a \"due or due soon\" health maintenance. I have asked that she contact her primary care provider for follow-up on this health maintenance. Physical Exam   Constitutional: She is oriented to person, place, and time. She appears well-developed and well-nourished. No distress. Tall stature   HENT:   Head: Normocephalic and atraumatic. Mouth/Throat: Normal dentition. Eyes: Right eye exhibits no discharge. Left eye exhibits no discharge. No scleral icterus. Neck: Neck supple. No JVD present. Carotid bruit is not present. No thyromegaly present. Cardiovascular: Normal rate, regular rhythm, S1 normal, S2 normal, normal heart sounds and intact distal pulses. Exam reveals no gallop and no friction rub. No murmur heard. Pulmonary/Chest: Effort normal and breath sounds normal. She has no wheezes. She has no rales. Abdominal: Soft. She exhibits no mass. There is no tenderness. Musculoskeletal: She exhibits no edema. Lymphadenopathy:        Right cervical: No superficial cervical adenopathy present. Left cervical: No superficial cervical adenopathy present. Neurological: She is alert and oriented to person, place, and time. Skin: Skin is warm and dry. No rash noted. Psychiatric: She has a normal mood and affect. Her behavior is normal.   CONCLUSIONS:  2015  1. No significant coronary artery disease  2. Normal left ventricular systolic function with LV EF 60%  3. Normal left ventricular end-diastolic pressure. RECOMMENDATIONS:   1. Risk factor modification including exercise. 2. Evaluation for noncardiac etiologies of chest discomfort. CT CTA CHEST PULMONARY3/25/2019  [facility]  Result Impression     1. No CT evidence of pulmonary thromboembolism. 2. Calcification in the LAD coronary artery.          ASSESSMENT and PLAN    Seen for bradycardia and dizziness. Discontinue Lopressor. Warned patient that she can get more palpitations and report to me if it happens. If she continues to feel dizzy, she should call me back and we will have to do a tilt table test as well. Check echo to rule out any pulmonary hypertension due to history of pulmonary embolism even though recent CTA chest was negative. Diagnoses and all orders for this visit:    1. Idiopathic hypotension  Comments:  Recurrent hypotension and dizziness. Possible idiopathic possible autonomic    2. History of pulmonary embolism  Comments:  on xarelto  recent ct negative fo r pe    3. History of DVT of lower extremity  Comments:  stable    4. History of gastric bypass    5. Coronary artery calcification of native artery  Comments:  LAD calcification reported on recent CAT scan. Negative cardiac cath in 2015 for any obstructive disease    Other orders  -     midodrine (PROAMITINE) 5 mg tablet; Take 1 Tab by mouth three (3) times daily (with meals) for 30 days. Pertinent laboratory and test data reviewed and discussed with patient. See patient instructions also for other medical advice given  4/2019  Recent worsening hypotension and dizziness. Near passing out feeling. Would discontinue metoprolol that has been done already. Recent CT scan negative for PE. Had LAD calcification. I have added midodrine 5 mg 3 times a day. Check lipid profile. Patient on Xarelto so would not add aspirin. 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 She will follow-up with Dr. Vieira Solid 4 weeks  There are no discontinued medications.

## 2019-04-08 NOTE — PROGRESS NOTES
1. Have you been to the ER, urgent care clinic since your last visit? Hospitalized since your last visit? No     2. Have you seen or consulted any other health care providers outside of the [address] [place] [address] since your last visit? Include any pap smears or colon screening. No     3. Since your last visit, have you had any of the following symptoms? .           4. Have you had any blood work, X-rays or cardiac testing? 5. Where do you normally have your labs drawn? 6. Do you need any refills today?

## 2019-04-08 NOTE — PATIENT INSTRUCTIONS
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   She will follow-up next in about 4 weeks

## 2019-04-10 ENCOUNTER — TELEPHONE (OUTPATIENT)
Dept: CARDIOLOGY CLINIC | Age: 59
End: 2019-04-10

## 2019-04-10 NOTE — TELEPHONE ENCOUNTER
Patient called stating that she saw Dr Delia Woods on Monday and he Rx for Midodrine 5 mg TID and patient states that BP has came up a little bit but hr is still the same patient still feels confused, she had another episode like she went to ER before heart felt like beating out of chest and irregular, face and hand tingling, dazed and confused. Patient was just riding in a car when the last episode happened. Please Advise.

## 2019-04-11 NOTE — TELEPHONE ENCOUNTER
Hopefully, we can  these rapid heartbeats on event monitor soon. In the meantime, she should go to the emergency room if the rapid heartbeat persists. She can call 911 if needed. Would not recommend her to drive with a heart rate of 200.

## 2019-04-11 NOTE — TELEPHONE ENCOUNTER
Patient called the office and stated she had another episode. She stated her blood pressure was 106/83 166  And 140/89 222  She states the called 911 but when they arrived her vitals was find and ekg was good. So she decided not to go to the hospital. She stated she still have headache and Dr. Anshul Ying was the one who put her on Midrin. She was having palpitations and was worked in with him when you was out. The event monitor have been ordered.

## 2019-04-16 ENCOUNTER — TELEPHONE (OUTPATIENT)
Dept: FAMILY MEDICINE CLINIC | Age: 59
End: 2019-04-16

## 2019-04-16 NOTE — TELEPHONE ENCOUNTER
Ms. Roland Boo called stating that she was leaving [address]. She was seen for the irregular heartbeat. Blood pressure and pulse staying low. Cardiologist put on meds to increase that. She is calling to see to inform Dr Maren Mckeon of what is going on. She would like a return call on home phone [phone] or mobile [phone]. She wasn't sure if she needed to come in and follow up with Dr. Maren Mckeon. Please review and advise.

## 2019-04-26 ENCOUNTER — OFFICE VISIT (OUTPATIENT)
Dept: FAMILY MEDICINE CLINIC | Age: 59
End: 2019-04-26

## 2019-04-26 VITALS
BODY MASS INDEX: 29.63 KG/M2 | DIASTOLIC BLOOD PRESSURE: 68 MMHG | RESPIRATION RATE: 14 BRPM | OXYGEN SATURATION: 98 % | SYSTOLIC BLOOD PRESSURE: 114 MMHG | HEART RATE: 57 BPM | WEIGHT: 207 LBS | HEIGHT: 70 IN

## 2019-04-26 DIAGNOSIS — R00.2 PALPITATIONS: ICD-10-CM

## 2019-04-26 DIAGNOSIS — R00.1 BRADYCARDIA: Primary | ICD-10-CM

## 2019-04-26 NOTE — PROGRESS NOTES
Estelita Jama is a 63 y.o. female  presents for follow up. She is feeling ok today. No chest pains or SOB. No Known Allergies  Outpatient Medications Marked as Taking for the 4/26/19 encounter (Office Visit) with Amber Yuen MD   Medication Sig Dispense Refill    midodrine (PROAMITINE) 5 mg tablet Take 1 Tab by mouth three (3) times daily (with meals) for 30 days. 90 Tab 6    levothyroxine (SYNTHROID) 137 mcg tablet Take 137 mcg by mouth Daily (before breakfast). 30 Tab 2    citalopram (CELEXA) 20 mg tablet Take 1 Tab by mouth daily. 30 Tab 3    rivaroxaban (XARELTO) 20 mg tab tablet Take 1 Tab by mouth daily. To follow up with Dr. Mejia  regarding when to stop medication and start Lovenox. 30 Tab 1    calcium citrate-vitamin D3 (UPCAL D) 500 mg-500 unit /5 gram powd Take 500 mg by mouth three (3) times daily.  BIOTIN PO Take  by mouth.  Cholecalciferol, Vitamin D3, (VITAMIN D3) 1,000 unit chew Take 2,000 Units by mouth daily.        Patient Active Problem List   Diagnosis Code    Hypothyroidism E03.9    DJD (degenerative joint disease) M19.90    DVT (deep venous thrombosis) (HCC) I82.409    Morbid obesity ([address] E66.01    HTN (hypertension) I10    FAINA (stress urinary incontinence, female) N39.3    GERD (gastroesophageal reflux disease) K21.9    CAMDEN on CPAP G47.33, Z99.89    History of pulmonary embolism Z86.711    History of DVT of lower extremity Z86.718    Bradycardia R00.1    History of gastric bypass Z98.84    Palpitations R00.2    History of hypertension Z86.79     Past Medical History:   Diagnosis Date    Arthritis     Chronic obstructive pulmonary disease (HCC)     hx pe    Coagulation disorder (HCC)     clotting disorder    Hx of colonic polyps     HX OTHER MEDICAL     pulmonary embolism x 2    HX OTHER MEDICAL     PCOS    HX OTHER MEDICAL     Heart monitor 1/2015     Hypertension     no longer on meds since gastric bypass    Long term (current) use of anticoagulants     Osteopenia     [facility]     Plantar fasciitis of left foot     Tendinopathy Oct 2013    Right insertional Achilles tendinopathy    Thromboembolus ([facility]     blood clot in left leg then lung in , then clot behind right knee 2009    Thromboembolus [facility])     also PE    Thyroid disease      Social History     Socioeconomic History    Marital status:      Spouse name: Not on file    Number of children: Not on file    Years of education: Not on file    Highest education level: Not on file   Tobacco Use    Smoking status: Current Every Day Smoker     Packs/day: 1.50     Years: 10.00     Pack years: 15.00     Types: Cigarettes     Last attempt to quit: 2013     Years since quittin.7    Smokeless tobacco: Never Used    Tobacco comment: vapor pen   Substance and Sexual Activity    Alcohol use: No     Alcohol/week: 2.0 oz     Types: 2 Glasses of wine, 2 Standard drinks or equivalent per week     Comment: once weekly; quit     Drug use: No     Family History   Problem Relation Age of Onset    Diabetes Other     Hypertension Other     Heart Disease Other     Arthritis-osteo Other     Stroke Mother 66    Stroke Father 36    Heart Attack Neg Hx         Review of Systems   Constitutional: Negative for chills, fever, malaise/fatigue and weight loss. Eyes: Negative for blurred vision. Respiratory: Negative for shortness of breath and wheezing. Cardiovascular: Negative for chest pain. Gastrointestinal: Negative for nausea and vomiting. Musculoskeletal: Negative for myalgias. Skin: Negative for rash. Neurological: Negative for weakness. Vitals:    19 1134   BP: 114/68   Pulse: (!) 57   Resp: 14   SpO2: 98%   Weight: 207 lb (93.9 kg)   Height: 5' 10\" (1.778 m)   LMP: 2013       Physical Exam   Constitutional: She is oriented to person, place, and time and well-developed, well-nourished, and in no distress. Neck: Normal range of motion. Neck supple. No thyromegaly present. Cardiovascular: Normal rate, regular rhythm and normal heart sounds. Pulmonary/Chest: Effort normal and breath sounds normal.   Musculoskeletal: Normal range of motion. Neurological: She is alert and oriented to person, place, and time. Skin: Skin is warm and dry. Psychiatric: Mood, memory, affect and judgment normal.   Nursing note and vitals reviewed. Assessment/Plan      ICD-10-CM ICD-9-CM    1. Bradycardia R00.1 427.89    2. Palpitations R00.2 785.1      both stable. Continue follow up with Cardiology    I have discussed the diagnosis with the patient and the intended plan of care as seen in the above orders. The patient has received an after-visit summary and questions were answered concerning future plans. I have discussed medication, side effects, and warnings with the patient in detail. The patient verbalized understanding and is in agreement with the plan of care. The patient will contact the office with any additional concerns.       Lab results and schedule of future lab studies reviewed with patient    Kaya Kerns MD

## 2019-04-26 NOTE — PATIENT INSTRUCTIONS
Bradycardia: Care Instructions  Your Care Instructions    Bradycardia is a slow heart rate. If your heart beats too slowly, it can't supply your body with enough blood. This can make you weak or dizzy. Or it may make you pass out. Sometimes medicine can cause this problem. If this happens, your doctor may have you adjust one of your medicines. If a medicine is not the problem, your doctor may recommend a pacemaker. It is important to treat bradycardia so that you don't get more serious health problems. Your doctor will want to see you on a routine schedule to make sure that your heartbeat is normal.  Follow-up care is a key part of your treatment and safety. Be sure to make and go to all appointments, and call your doctor if you are having problems. It's also a good idea to know your test results and keep a list of the medicines you take. How can you care for yourself at home? · Take your medicines exactly as prescribed. Call your doctor if you think you are having a problem with your medicine. If your bradycardia is caused by another disease, your doctor will try to treat the disease. If it is caused by heart medicines, he or she will adjust your medicines. · Make lifestyle changes to improve your heart health. ? Get regular exercise. Try for 30 minutes on most days of the week. If you do not have other heart problems, you likely do not have limits on the type or level of activity that you can do. You may want to walk, swim, bike, or do other activities. Ask your doctor what level of exercise is safe for you. ? To control your cholesterol, avoid foods with a lot of fat, saturated fat, or sodium. Try to eat more fiber. And if your doctor says it's okay, get some exercise on most days. ? Do not smoke. Smoking can make your heart condition worse. If you need help quitting, talk to your doctor about stop-smoking programs and medicines. These can increase your chances of quitting for good. ?  Limit alcohol to 2 drinks a day for men and 1 drink a day for women. Too much alcohol can cause health problems. Pacemaker  If you have a pacemaker, you will get more specific information about it. Be sure to:  · Check your pulse as your doctor tells you. · Have your pacemaker checked as often as your doctor recommends. You may be able to do this over the phone or computer. · Avoid strong magnetic or electrical fields. These include MRIs, welding equipment, and generators. · You will be checked several times right after you get your pacemaker and when it is time to have the battery changed. Batteries last for 5 to 15 years. · You can talk on a cell phone. But keep it 6 inches away from your pacemaker. · Microwaves, TVs, radios, and kitchen and bathroom appliances won't harm you. When should you call for help? Call 911 anytime you think you may need emergency care. For example, call if:    · You have symptoms of sudden heart failure. These may include:  ? Severe trouble breathing. ? A fast or irregular heartbeat. ? Coughing up pink, foamy mucus. ? You passed out.     · You have symptoms of a stroke. These may include:  ? Sudden numbness, tingling, weakness, or loss of movement in your face, arm, or leg, especially on only one side of your body. ? Sudden vision changes. ? Sudden trouble speaking. ? Sudden confusion or trouble understanding simple statements. ? Sudden problems with walking or balance. ? A sudden, severe headache that is different from past headaches.    Call your doctor now or seek immediate medical care if:    · You have new or changed symptoms of heart failure, such as:  ? New or increased shortness of breath. ? New or worse swelling in your legs, ankles, or feet. ? Sudden weight gain, such as more than 2 to 3 pounds in a day or 5 pounds in a week. (Your doctor may suggest a different range of weight gain.)  ? Feeling dizzy or lightheaded or like you may faint. ?  Feeling so tired or weak that you cannot do your usual activities. ? Not sleeping well. Shortness of breath wakes you at night. You need extra pillows to prop yourself up to breathe easier.    Watch closely for changes in your health, and be sure to contact your doctor if:    · You do not get better as expected. Where can you learn more? Go to http://brian-cindy.info/. Enter L631 in the search box to learn more about \"Bradycardia: Care Instructions. \"  Current as of: July 22, 2018  Content Version: 11.9  © 0502-2781 [employer]. Care instructions adapted under license by [employer] (which disclaims liability or warranty for this information). If you have questions about a medical condition or this instruction, always ask your healthcare professional. [address] any warranty or liability for your use of this information.

## 2019-05-10 ENCOUNTER — TELEPHONE (OUTPATIENT)
Dept: CARDIOLOGY CLINIC | Age: 59
End: 2019-05-10

## 2019-05-10 NOTE — TELEPHONE ENCOUNTER
Patient daughter stated Ms. Sathish Stinson had an episode. Wanted us to pull any readings thus far for review and to give her a call back.

## 2019-05-14 DIAGNOSIS — I15.8 OTHER SECONDARY HYPERTENSION: Primary | ICD-10-CM

## 2019-05-14 RX ORDER — MIDODRINE HYDROCHLORIDE 10 MG/1
10 TABLET ORAL
Qty: 90 TAB | Refills: 0 | Status: SHIPPED | OUTPATIENT
Start: 2019-05-14 | End: 2019-05-17

## 2019-05-14 NOTE — TELEPHONE ENCOUNTER
Patient in office orthostatic blood pressures are as follows:    Laying 136/72  Sitting 126/70  Standing 108/62

## 2019-05-14 NOTE — TELEPHONE ENCOUNTER
Per NP Vladimir's verbal order Midodrine 10 mg TID ordered for patient; sent to the [facility] pharmacy in [place], [place].

## 2019-05-17 ENCOUNTER — OFFICE VISIT (OUTPATIENT)
Dept: CARDIOLOGY CLINIC | Age: 59
End: 2019-05-17

## 2019-05-17 VITALS
HEART RATE: 70 BPM | WEIGHT: 209 LBS | BODY MASS INDEX: 29.92 KG/M2 | DIASTOLIC BLOOD PRESSURE: 87 MMHG | HEIGHT: 70 IN | SYSTOLIC BLOOD PRESSURE: 114 MMHG

## 2019-05-17 DIAGNOSIS — R00.2 PALPITATIONS: Primary | ICD-10-CM

## 2019-05-17 DIAGNOSIS — Z98.84 HISTORY OF GASTRIC BYPASS: ICD-10-CM

## 2019-05-17 DIAGNOSIS — R42 DIZZINESS: ICD-10-CM

## 2019-05-17 DIAGNOSIS — R00.1 BRADYCARDIA: ICD-10-CM

## 2019-05-17 DIAGNOSIS — Z71.6 TOBACCO ABUSE COUNSELING: ICD-10-CM

## 2019-05-17 DIAGNOSIS — Z86.711 HISTORY OF PULMONARY EMBOLISM: ICD-10-CM

## 2019-05-17 DIAGNOSIS — Z86.718 HISTORY OF DVT OF LOWER EXTREMITY: ICD-10-CM

## 2019-05-17 RX ORDER — MIDODRINE HYDROCHLORIDE 5 MG/1
5 TABLET ORAL
Qty: 90 TAB | Refills: 0
Start: 2019-05-17 | End: 2019-06-11 | Stop reason: SDUPTHER

## 2019-05-17 RX ORDER — VARENICLINE TARTRATE 25 MG
0.5 KIT ORAL
Qty: 1 DOSE PACK | Refills: 0 | Status: SHIPPED | OUTPATIENT
Start: 2019-05-17 | End: 2019-08-23

## 2019-05-17 NOTE — PROGRESS NOTES
1. Have you been to the ER, urgent care clinic since your last visit? Hospitalized since your last visit? No     2. Have you seen or consulted any other health care providers outside of the [address] [place] [address] since your last visit? Include any pap smears or colon screening. No     3. Since your last visit, have you had any of the following symptoms? .           4. Have you had any blood work, X-rays or cardiac testing? 5. Where do you normally have your labs drawn? [facility]    6. Do you need any refills today?    No

## 2019-05-17 NOTE — PATIENT INSTRUCTIONS
Medications Discontinued During This Encounter   Medication Reason    midodrine (PROAMITINE) 10 mg tablet     citalopram (CELEXA) 20 mg tablet Side Effects     Increase water intake and use compression stockings     Orthostatic Hypotension: Care Instructions  Your Care Instructions    Orthostatic hypotension is a quick drop in blood pressure. It happens when you get up from sitting or lying down. You may feel faint, lightheaded, or dizzy. When a person sits up or stands up, the body changes the way it pumps blood. This can slow the flow of blood to the brain for a very short time. And that can make you feel lightheaded. Many medicines can cause this problem, especially in older people. Lack of fluids (dehydration) or illnesses such as diabetes or heart disease also can cause it. Follow-up care is a key part of your treatment and safety. Be sure to make and go to all appointments, and call your doctor if you are having problems. It's also a good idea to know your test results and keep a list of the medicines you take. How can you care for yourself at home? · Tell your doctor about any problems you have with your medicines. · If your doctor prescribes medicine to help prevent a low blood pressure problem, take it exactly as prescribed. Call your doctor if you think you are having a problem with your medicine. · Drink plenty of fluids, enough so that your urine is light yellow or clear like water. Choose water and other caffeine-free clear liquids. If you have kidney, heart, or liver disease and have to limit fluids, talk with your doctor before you increase the amount of fluids you drink. · Limit or avoid alcohol and caffeine. · Get up slowly from bed or after sitting for a long time. If you are in bed, roll to your side and swing your legs over the edge of the bed and onto the floor. Push your body up to a sitting position. Wait for a while before you slowly stand up.  If you are dizzy or lightheaded, sit or lie down. When should you call for help? Call 911 anytime you think you may need emergency care. For example, call if:    · You passed out (lost consciousness).    Watch closely for changes in your health, and be sure to contact your doctor if:    · You do not get better as expected. Where can you learn more? Go to http://brian-cindy.info/. Enter M696 in the search box to learn more about \"Orthostatic Hypotension: Care Instructions. \"  Current as of: July 22, 2018  Content Version: 11.9  © 6658-7303 [employer]. Care instructions adapted under license by [employer] (which disclaims liability or warranty for this information). If you have questions about a medical condition or this instruction, always ask your healthcare professional. [address] any warranty or liability for your use of this information.

## 2019-05-31 ENCOUNTER — TELEPHONE (OUTPATIENT)
Dept: CARDIOLOGY CLINIC | Age: 59
End: 2019-05-31

## 2019-05-31 NOTE — TELEPHONE ENCOUNTER
Patient would like a note to return to work as of Sania 3,2019. Patient states her blood pressure has been good running around 102/66, no dizziness only fluttering at night when laying down to sleep. No episodes to report.  Please advise

## 2019-05-31 NOTE — TELEPHONE ENCOUNTER
Spoke with patient, informed her return to work letter is ready. Informed patient to be careful of her surroundings if she should experience a new episode of syncope. Patient expressed understandings.

## 2019-05-31 NOTE — TELEPHONE ENCOUNTER
Okay to join the work but she should not work and surroundings in which she can get injured in case she has syncopal episode.

## 2019-06-10 RX ORDER — LEVOTHYROXINE SODIUM 137 UG/1
TABLET ORAL
Qty: 30 TAB | Refills: 2 | Status: SHIPPED | OUTPATIENT
Start: 2019-06-10 | End: 2019-07-04 | Stop reason: SDUPTHER

## 2019-06-13 ENCOUNTER — OFFICE VISIT (OUTPATIENT)
Dept: FAMILY MEDICINE CLINIC | Age: 59
End: 2019-06-13

## 2019-06-13 ENCOUNTER — HOSPITAL ENCOUNTER (OUTPATIENT)
Dept: LAB | Age: 59
Discharge: HOME | End: 2019-06-13
Payer: COMMERCIAL

## 2019-06-13 VITALS
RESPIRATION RATE: 12 BRPM | TEMPERATURE: 98.2 F | DIASTOLIC BLOOD PRESSURE: 70 MMHG | BODY MASS INDEX: 29.63 KG/M2 | SYSTOLIC BLOOD PRESSURE: 120 MMHG | OXYGEN SATURATION: 98 % | HEIGHT: 70 IN | WEIGHT: 207 LBS | HEART RATE: 78 BPM

## 2019-06-13 DIAGNOSIS — Z82.0 FAMILY HISTORY OF PARKINSON DISEASE: ICD-10-CM

## 2019-06-13 DIAGNOSIS — Z86.718 HISTORY OF DVT OF LOWER EXTREMITY: ICD-10-CM

## 2019-06-13 DIAGNOSIS — I10 ESSENTIAL HYPERTENSION: Primary | ICD-10-CM

## 2019-06-13 DIAGNOSIS — E03.8 OTHER SPECIFIED HYPOTHYROIDISM: ICD-10-CM

## 2019-06-13 DIAGNOSIS — I10 ESSENTIAL HYPERTENSION: ICD-10-CM

## 2019-06-13 DIAGNOSIS — Z12.11 COLON CANCER SCREENING: ICD-10-CM

## 2019-06-13 DIAGNOSIS — Z12.39 BREAST CANCER SCREENING: ICD-10-CM

## 2019-06-13 PROCEDURE — 84439 ASSAY OF FREE THYROXINE: CPT

## 2019-06-13 PROCEDURE — 80053 COMPREHEN METABOLIC PANEL: CPT

## 2019-06-13 NOTE — PROGRESS NOTES
Chief Complaint   Patient presents with    Blood Clot    Foot Pain    Swelling    Leg Pain     Pt c/o foot pain and swelling. Concerned about another DVT. States that she feel dizzy in the morning. Has been seen by cardio. Was told that she may need to see a neuro for tremors. Also c/o alternating between fatigue to having burst of energy. 1. Have you been to the ER, urgent care clinic since your last visit? Hospitalized since your last visit? No    2. Have you seen or consulted any other health care providers outside of the [address] [place] [address] since your last visit? Include any pap smears or colon screening.  No

## 2019-06-13 NOTE — PROGRESS NOTES
Serina Desir is a 63 y.o. female  presents for follow up. She has had recurrent DVT's. She is currently on Xeralto. She is having some swelling in her feet. No pain fever chills nausea or vomiting. She also went to Er and had some tremors with assoc headache. She has family history of Parkinsons. No weakness or numbness. No Known Allergies  Outpatient Medications Marked as Taking for the 6/13/19 encounter (Office Visit) with Bailey Jeffers MD   Medication Sig Dispense Refill    midodrine (PROAMITINE) 5 mg tablet Take 1 Tab by mouth three (3) times daily (with meals). 90 Tab 5    levothyroxine (SYNTHROID) 137 mcg tablet TAKE ONE TABLET BY MOUTH DAILY (BEFORE BREAKFAST). 30 Tab 2    varenicline (CHANTIX STARTER NANI) 0.5 mg (11)- 1 mg (42) DsPk Take 0.5 mg by mouth two (2) times daily (after meals). 1 Dose Pack 0    comprs. stocking,thigh,long,med (COMP. STOCKING,THIGH,LONG,MED.) misc 2 Packages by Does Not Apply route daily as needed (edema). Remove when lying down 2 Package 0    thiamine HCl (VITAMIN B-1 PO) Take  by mouth.  rivaroxaban (XARELTO) 20 mg tab tablet Take 1 Tab by mouth daily. To follow up with Dr. Lyric Atkins regarding when to stop medication and start Lovenox. 30 Tab 1    cyanocobalamin (VITAMIN B-12) 500 mcg tablet Take 500 mcg by mouth daily.  multivitamin (ONE A DAY) tablet Take 1 Tab by mouth two (2) times a day.  calcium citrate-vitamin D3 (UPCAL D) 500 mg-500 unit /5 gram powd Take 500 mg by mouth three (3) times daily.  BIOTIN PO Take  by mouth.  Cholecalciferol, Vitamin D3, (VITAMIN D3) 1,000 unit chew Take 2,000 Units by mouth daily.        Patient Active Problem List   Diagnosis Code    Hypothyroidism E03.9    DJD (degenerative joint disease) M19.90    DVT [facility] thrombosis) [facility]) I82.409    Morbid obesity ([facility] E66.01    HTN (hypertension) I10    FAINA (stress urinary incontinence, female) N39.3    GERD (gastroesophageal reflux disease) K21.9    CAMDEN on CPAP G47.33, Z99.89    History of pulmonary embolism Z86.711    History of DVT of lower extremity Z86.718    Bradycardia R00.1    History of gastric bypass Z98.84    Palpitations R00.2    History of hypertension Z86.79     Past Medical History:   Diagnosis Date    Arthritis     Chronic obstructive pulmonary disease (HCC)     hx pe    Coagulation disorder (HCC)     clotting disorder    Hx of colonic polyps     HX OTHER MEDICAL     pulmonary embolism x 2    HX OTHER MEDICAL     PCOS    HX OTHER MEDICAL     Heart monitor 2015     Hypertension     no longer on meds since gastric bypass    Long term (current) use of anticoagulants     Osteopenia     PE (pulmonary thromboembolism) ([address]     Plantar fasciitis of left foot     Tendinopathy Oct 2013    Right insertional Achilles tendinopathy    Thromboembolus ([address]     blood clot in left leg then lung in , then clot behind right knee     Thromboembolus ([address]     also PE    Thyroid disease      Social History     Socioeconomic History    Marital status:      Spouse name: Not on file    Number of children: Not on file    Years of education: Not on file    Highest education level: Not on file   Tobacco Use    Smoking status: Current Every Day Smoker     Packs/day: 1.50     Years: 10.00     Pack years: 15.00     Types: Cigarettes     Last attempt to quit: 2013     Years since quittin.8    Smokeless tobacco: Never Used    Tobacco comment: vapor pen   Substance and Sexual Activity    Alcohol use: No     Alcohol/week: 2.0 oz     Types: 2 Glasses of wine, 2 Standard drinks or equivalent per week     Comment: once weekly; quit     Drug use: No     Family History   Problem Relation Age of Onset    Diabetes Other     Hypertension Other     Heart Disease Other     Arthritis-osteo Other     Stroke Mother 66    Stroke Father 36    Heart Attack Neg Hx         Review of Systems   Constitutional: Negative for chills, fever, malaise/fatigue and weight loss. Eyes: Negative for blurred vision. Respiratory: Negative for shortness of breath and wheezing. Cardiovascular: Positive for leg swelling. Negative for chest pain. Gastrointestinal: Negative for nausea and vomiting. Musculoskeletal: Negative for myalgias. Skin: Negative for rash. Neurological: Negative for weakness. Vitals:    06/13/19 1004   BP: 120/70   Pulse: 78   Resp: 12   Temp: 98.2 °F (36.8 °C)   SpO2: 98%   Weight: 207 lb (93.9 kg)   Height: 5' 10\" (1.778 m)   LMP: 12/31/2013       Physical Exam   Constitutional: She is oriented to person, place, and time and well-developed, well-nourished, and in no distress. Neck: Normal range of motion. Neck supple. No thyromegaly present. Cardiovascular: Normal rate, regular rhythm and normal heart sounds. Pulmonary/Chest: Effort normal and breath sounds normal.   Musculoskeletal: Normal range of motion. She exhibits no edema, tenderness or deformity. Neurological: She is alert and oriented to person, place, and time. Skin: Skin is warm and dry. Psychiatric: Mood, memory, affect and judgment normal.   Nursing note and vitals reviewed. Assessment/Plan      ICD-10-CM ICD-9-CM    1. Essential hypertension G98 416.5 METABOLIC PANEL, COMPREHENSIVE   2. Other specified hypothyroidism E03.8 244.8 TSH AND FREE T4   3. History of DVT of lower extremity Z86.718 V12.51    4. Breast cancer screening Z12.31 V76.10 MAKAYLA MAMMO BI SCREENING INCL CAD   5. Colon cancer screening Z12.11 V76.51 REFERRAL TO GASTROENTEROLOGY   6. Family history of Parkinson disease Z82.0 V17.2 REFERRAL TO NEUROLOGY     I have discussed the diagnosis with the patient and the intended plan of care as seen in the above orders. The patient has received an after-visit summary and questions were answered concerning future plans. I have discussed medication, side effects, and warnings with the patient in detail.  The patient verbalized understanding and is in agreement with the plan of care. The patient will contact the office with any additional concerns.     lab results and schedule of future lab studies reviewed with patient    Brandon Elena MD

## 2019-06-13 NOTE — PATIENT INSTRUCTIONS
Colon Cancer Screening: Care Instructions  Your Care Instructions    Colorectal cancer occurs in the colon or rectum. That's the lower part of your digestive system. It is the second-leading cause of cancer deaths in the [place]. It often starts with small growths called polyps in the colon or rectum. Polyps are usually found with screening tests. Depending on the type of test, any polyps found may be removed during the tests. Colorectal cancer usually does not cause symptoms at first. But regular tests can help find it early, before it spreads and becomes harder to treat. Experts advise routine tests for colon cancer for people starting at age 48. And they advise people with a higher risk of colon cancer to get tested sooner. Talk with your doctor about when you should start testing. Discuss which tests you need. Follow-up care is a key part of your treatment and safety. Be sure to make and go to all appointments, and call your doctor if you are having problems. It's also a good idea to know your test results and keep a list of the medicines you take. What are the main screening tests for colon cancer? · Stool tests. These include the fecal immunochemical test (FIT) and the fecal occult blood test (FOBT). These tests check stool samples for signs of cancer. If your test is positive, you will need to have a colonoscopy. · Sigmoidoscopy. This test lets your doctor look at the lining of your rectum and the lowest part of your colon. Your doctor uses a lighted tube called a sigmoidoscope. This test can't find cancers or polyps in the upper part of your colon. In some cases, polyps that are found can be removed. But if your doctor finds polyps, you will need to have a colonoscopy to check the upper part of your colon. · Colonoscopy. This test lets your doctor look at the lining of your rectum and your entire colon. The doctor uses a thin, flexible tool called a colonoscope.  It can also be used to remove polyps or get a tissue sample (biopsy). What tests do you need? The following guidelines are for people age 48 and over who are not at high risk for colorectal cancer. You may have at least one of these tests as directed by your doctor. · Fecal immunochemical test (FIT) or fecal occult blood test (FOBT) every year  · Sigmoidoscopy every 5 years  · Colonoscopy every 10 years  If you are age 68 to 80, you can work with your doctor to decide if screening is a good option. If you are age 80 or older, your doctor will likely advise that screening is not helpful. Talk with your doctor about when you need to be tested. And discuss which tests are right for you. Your doctor may recommend earlier or more frequent testing if you:  · Have had colorectal cancer before. · Have had colon polyps. · Have symptoms of colorectal cancer. These include blood in your stool and changes in your bowel habits. · Have a parent, brother or sister, or child with colon polyps or colorectal cancer. · Have a bowel disease. This includes ulcerative colitis and Crohn's disease. · Have a rare polyp syndrome that runs in families, such as familial adenomatous polyposis (FAP). · Have had radiation treatments to the belly or pelvis. When should you call for help? Watch closely for changes in your health, and be sure to contact your doctor if:    · You have any changes in your bowel habits.     · You have any problems. Where can you learn more? Go to http://brian-cindy.info/. Enter M541 in the search box to learn more about \"Colon Cancer Screening: Care Instructions. \"  Current as of: March 27, 2018  Content Version: 11.9  © 7426-3470 [employer]. Care instructions adapted under license by [employer] (which disclaims liability or warranty for this information).  If you have questions about a medical condition or this instruction, always ask your healthcare professional. Riya Mccall disclaims any warranty or liability for your use of this information.

## 2019-06-14 DIAGNOSIS — I15.8 OTHER SECONDARY HYPERTENSION: ICD-10-CM

## 2019-06-14 LAB
ALBUMIN SERPL-MCNC: 4 G/DL (ref 3.4–5)
ALBUMIN/GLOB SERPL: 1.3 {RATIO} (ref 0.8–1.7)
ALP SERPL-CCNC: 90 U/L (ref 45–117)
ALT SERPL-CCNC: 26 U/L (ref 13–56)
ANION GAP SERPL CALC-SCNC: 9 MMOL/L (ref 3–18)
AST SERPL-CCNC: 19 U/L (ref 15–37)
BILIRUB SERPL-MCNC: 0.4 MG/DL (ref 0.2–1)
BUN SERPL-MCNC: 18 MG/DL (ref 7–18)
BUN/CREAT SERPL: 20 (ref 12–20)
CALCIUM SERPL-MCNC: 9.2 MG/DL (ref 8.5–10.1)
CHLORIDE SERPL-SCNC: 110 MMOL/L (ref 100–108)
CO2 SERPL-SCNC: 25 MMOL/L (ref 21–32)
CREAT SERPL-MCNC: 0.92 MG/DL (ref 0.6–1.3)
GLOBULIN SER CALC-MCNC: 3.1 G/DL (ref 2–4)
GLUCOSE SERPL-MCNC: 80 MG/DL (ref 74–99)
POTASSIUM SERPL-SCNC: 4.1 MMOL/L (ref 3.5–5.5)
PROT SERPL-MCNC: 7.1 G/DL (ref 6.4–8.2)
SODIUM SERPL-SCNC: 144 MMOL/L (ref 136–145)
T4 FREE SERPL-MCNC: 1.3 NG/DL (ref 0.7–1.5)
TSH SERPL DL<=0.05 MIU/L-ACNC: 0.35 UIU/ML (ref 0.36–3.74)

## 2019-06-14 RX ORDER — MIDODRINE HYDROCHLORIDE 10 MG/1
TABLET ORAL
Qty: 90 TAB | Refills: 0 | Status: SHIPPED | OUTPATIENT
Start: 2019-06-14 | End: 2019-08-21 | Stop reason: ALTCHOICE

## 2019-06-21 ENCOUNTER — HOSPITAL ENCOUNTER (OUTPATIENT)
Dept: MAMMOGRAPHY | Age: 59
Discharge: HOME | End: 2019-06-21
Attending: FAMILY MEDICINE
Payer: COMMERCIAL

## 2019-06-21 DIAGNOSIS — Z12.39 BREAST CANCER SCREENING: ICD-10-CM

## 2019-06-21 PROCEDURE — 77067 SCR MAMMO BI INCL CAD: CPT

## 2019-07-01 ENCOUNTER — TELEPHONE (OUTPATIENT)
Dept: FAMILY MEDICINE CLINIC | Age: 59
End: 2019-07-01

## 2019-07-01 NOTE — TELEPHONE ENCOUNTER
Patient called and is follwoing up on the referrals to neuro and GI.   Please call her with an update [phone]

## 2019-07-03 NOTE — PROGRESS NOTES
Pt is aware of results and plan. Instructed to call the office if she has any breast changes or concerns. Pt expressed clear understanding and had no further questions.

## 2019-07-03 NOTE — TELEPHONE ENCOUNTER
Patient called the office back had her verify her  she was given contact information for [facility] as well as Dr. Nhan Galicia office.

## 2019-07-05 RX ORDER — LEVOTHYROXINE SODIUM 137 UG/1
TABLET ORAL
Qty: 30 TAB | Refills: 2 | Status: SHIPPED | OUTPATIENT
Start: 2019-07-05 | End: 2019-07-28 | Stop reason: SDUPTHER

## 2019-07-15 ENCOUNTER — TELEPHONE (OUTPATIENT)
Dept: FAMILY MEDICINE CLINIC | Age: 59
End: 2019-07-15

## 2019-07-22 ENCOUNTER — OFFICE VISIT (OUTPATIENT)
Dept: CARDIOLOGY CLINIC | Age: 59
End: 2019-07-22

## 2019-07-22 VITALS
HEART RATE: 61 BPM | WEIGHT: 212 LBS | SYSTOLIC BLOOD PRESSURE: 116 MMHG | BODY MASS INDEX: 30.35 KG/M2 | HEIGHT: 70 IN | DIASTOLIC BLOOD PRESSURE: 71 MMHG

## 2019-07-22 DIAGNOSIS — I95.89 OTHER SPECIFIED HYPOTENSION: ICD-10-CM

## 2019-07-22 DIAGNOSIS — Z71.6 TOBACCO ABUSE COUNSELING: ICD-10-CM

## 2019-07-22 DIAGNOSIS — R00.1 BRADYCARDIA: ICD-10-CM

## 2019-07-22 DIAGNOSIS — R42 DIZZINESS: ICD-10-CM

## 2019-07-22 DIAGNOSIS — Z86.711 HISTORY OF PULMONARY EMBOLISM: ICD-10-CM

## 2019-07-22 DIAGNOSIS — R00.2 PALPITATIONS: Primary | ICD-10-CM

## 2019-07-22 DIAGNOSIS — Z86.718 HISTORY OF DVT OF LOWER EXTREMITY: ICD-10-CM

## 2019-07-22 DIAGNOSIS — R07.9 CHEST PAIN, UNSPECIFIED TYPE: ICD-10-CM

## 2019-07-22 NOTE — PROGRESS NOTES
HISTORY OF PRESENT ILLNESS  Ronnie Mccallum is a 63 y.o. female. 4/19 history of syncope about 2014 approximately. It happened after gastric bypass surgery when she had lost a lot of weight. 4/19 history of CP lower sternal, radiates to the back. No relation to activity or food. Lasting many hours. No radiation no associated nausea vomiting diaphoresis. Feels harder to breathe during the episode. No medicines. Spontaneous relief in few hours. 7/2019 C/O substernal chest pain with exertion. She continues to have dizziness and palpitations in the evenings. She denies having syncopal episode. Slow Heart Rate   The history is provided by the patient and medical records. This is a new problem. The current episode started more than 1 week ago. Associated symptoms include chest pain. Pertinent negatives include no headaches and no shortness of breath. Palpitations    The history is provided by the patient. This is a new problem. The current episode started more than 1 week ago (few weaks, feels a hard beat but the rate is still 50-60.). Associated symptoms include chest pain and dizziness. Pertinent negatives include no fever, no malaise/fatigue, no claudication, no orthopnea, no PND, no nausea, no vomiting, no headaches, no cough and no shortness of breath. Dizziness   The history is provided by the patient. This is a new problem. The current episode started more than 1 week ago (yrs; see comments also). The problem occurs rarely (once in a few months). The problem has been gradually worsening (almost daily AMs). Associated symptoms include chest pain. Pertinent negatives include no headaches and no shortness of breath. Exacerbated by: driving. The symptoms are relieved by rest (pull over if driving). Review of Systems   Constitutional: Negative for chills, fever, malaise/fatigue and weight loss. HENT: Negative for nosebleeds. Eyes: Negative for discharge.    Respiratory: Negative for cough, shortness of breath and wheezing. Cardiovascular: Positive for chest pain and palpitations. Negative for orthopnea, claudication, leg swelling and PND. Gastrointestinal: Negative for diarrhea, nausea and vomiting. Genitourinary: Negative for dysuria and hematuria. Musculoskeletal: Negative for joint pain. Skin: Negative for rash. Neurological: Positive for dizziness. Negative for seizures, loss of consciousness and headaches. Endo/Heme/Allergies: Negative for polydipsia. Does not bruise/bleed easily. Psychiatric/Behavioral: Negative for depression and substance abuse. The patient does not have insomnia.       No Known Allergies    Past Medical History:   Diagnosis Date    Arthritis     Chronic obstructive pulmonary disease (HCC)     hx pe    Coagulation disorder (HCC)     clotting disorder    Hx of colonic polyps     HX OTHER MEDICAL     pulmonary embolism x 2    HX OTHER MEDICAL     PCOS    HX OTHER MEDICAL     Heart monitor 2015     Hypertension     no longer on meds since gastric bypass    Long term (current) use of anticoagulants     Osteopenia     PE (pulmonary thromboembolism) ([address]     Plantar fasciitis of left foot     Tendinopathy Oct 2013    Right insertional Achilles tendinopathy    Thromboembolus ([address]     blood clot in left leg then lung in , then clot behind right knee     Thromboembolus ([address]     also PE    Thyroid disease        Family History   Problem Relation Age of Onset    Diabetes Other     Hypertension Other     Heart Disease Other     Arthritis-osteo Other     Stroke Mother 66    Stroke Father 36    Heart Attack Neg Hx        Social History     Tobacco Use    Smoking status: Current Every Day Smoker     Packs/day: 1.50     Years: 10.00     Pack years: 15.00     Types: Cigarettes     Last attempt to quit: 2013     Years since quittin.9    Smokeless tobacco: Never Used    Tobacco comment: vapor pen   Substance Use Topics  Alcohol use: No     Alcohol/week: 3.3 standard drinks     Types: 2 Glasses of wine, 2 Standard drinks or equivalent per week     Comment: once weekly; quit 2018    Drug use: No        Current Outpatient Medications   Medication Sig    levothyroxine (SYNTHROID) 137 mcg tablet TAKE ONE TABLET BY MOUTH DAILY (BEFORE BREAKFAST).  varenicline (CHANTIX) 1 mg tablet Take 1 Tab by mouth two (2) times a day for 90 days.  midodrine (PROAMITINE) 10 mg tablet TAKE 1 TABLET BY MOUTH THREE (3) TIMES DAILY (WITH MEALS) FOR 30 DAYS. (Patient taking differently: 5 mg.)    varenicline (CHANTIX STARTER NANI) 0.5 mg (11)- 1 mg (42) DsPk Take 0.5 mg by mouth two (2) times daily (after meals).  comprs. stocking,thigh,long,med (COMP. STOCKING,THIGH,LONG,MED.) misc 2 Packages by Does Not Apply route daily as needed (edema). Remove when lying down    thiamine HCl (VITAMIN B-1 PO) Take  by mouth.  rivaroxaban (XARELTO) 20 mg tab tablet Take 1 Tab by mouth daily. To follow up with Dr. Rachael Jamison regarding when to stop medication and start Lovenox.  cyanocobalamin (VITAMIN B-12) 500 mcg tablet Take 500 mcg by mouth daily.  multivitamin (ONE A DAY) tablet Take 1 Tab by mouth two (2) times a day.  calcium citrate-vitamin D3 (UPCAL D) 500 mg-500 unit /5 gram powd Take 500 mg by mouth three (3) times daily.  BIOTIN PO Take  by mouth.  Cholecalciferol, Vitamin D3, (VITAMIN D3) 1,000 unit chew Take 2,000 Units by mouth daily. No current facility-administered medications for this visit.          Past Surgical History:   Procedure Laterality Date    HX DILATION AND CURETTAGE      ablation    HX DILATION AND CURETTAGE      age 17    HX GASTRIC BYPASS  1/15/2014    HX TUBAL LIGATION  1996       Visit Vitals  /71   Pulse 61   Ht 5' 10\" (1.778 m)   Wt 96.2 kg (212 lb)   LMP 12/31/2013   BMI 30.42 kg/m²     Vitals:    07/22/19 1238   BP: 116/71   Pulse: 61   Weight: 96.2 kg (212 lb)   Height: 5' 10\" (1.778 m) Diagnostic Studies:  I have reviewed the relevant tests done on the patient and show as follows  EKG tracings reviewed by me today. EKG Results     None        XR Results (most recent):  Results from Hospital Encounter encounter on 12/09/13   XR CHEST PA LAT    Narrative History: Preop    PA AND LATERAL CHEST 2 VIEWS    CPT: 03650    The heart and lungs and bony structures appear unremarkable for a patient of  this age. Impression IMPRESSION:    Normal chest.       No flowsheet data found. 4/19 echo  Interpretation Summary        · Normal cavity size, systolic function (ejection fraction normal) and diastolic function. Mild concentric hypertrophy. The estimated ejection fraction is 56 - 60%. No regional wall motion abnormality noted. · Normal right ventricular size and function. · Mild tricuspid valve regurgitation is present. Pulmonary arterial systolic pressure is 42.7 mmHg. There is no evidence of pulmonary hypertension. · Mildly elevated central venous pressure (5-10 mmHg); IVC diameter is less than 21 mm and collapses less than 50% with respiration. Comparison Study Information   Prior Study   There is a prior study available for comparison that was performed on 11/25/2014. As compared to the previous study, there are no significant changes. Ms. Jamie Salvador has a reminder for a \"due or due soon\" health maintenance. I have asked that she contact her primary care provider for follow-up on this health maintenance. Physical Exam   Constitutional: She is oriented to person, place, and time. She appears well-developed and well-nourished. No distress. Tall stature   HENT:   Head: Normocephalic and atraumatic. Mouth/Throat: Normal dentition. Eyes: Right eye exhibits no discharge. Left eye exhibits no discharge. No scleral icterus. Neck: Neck supple. No JVD present. Carotid bruit is not present. No thyromegaly present.    Cardiovascular: Normal rate, regular rhythm, S1 normal, S2 normal, normal heart sounds and intact distal pulses. Exam reveals no gallop and no friction rub. No murmur heard. Pulmonary/Chest: Effort normal and breath sounds normal. She has no wheezes. She has no rales. Abdominal: Soft. She exhibits no mass. There is no tenderness. Musculoskeletal: She exhibits no edema. Lymphadenopathy:        Right cervical: No superficial cervical adenopathy present. Left cervical: No superficial cervical adenopathy present. Neurological: She is alert and oriented to person, place, and time. Skin: Skin is warm and dry. No rash noted. Psychiatric: She has a normal mood and affect. Her behavior is normal.   CONCLUSIONS:  2015  1. No significant coronary artery disease  2. Normal left ventricular systolic function with LV EF 60%  3. Normal left ventricular end-diastolic pressure. RECOMMENDATIONS:   1. Risk factor modification including exercise. 2. Evaluation for noncardiac etiologies of chest discomfort. CT CTA CHEST PULMONARY3/25/2019  [facility]  Result Impression     1. No CT evidence of pulmonary thromboembolism. 2. Calcification in the LAD coronary artery. 5/2019 - Event monitor   No evidence of severe roberto or tachy arrhythmias. Patient markers associated with sinus rhythm    ASSESSMENT and PLAN    Patient advised to stop smoking to reduce future cardiovascular events. 7/2019 C/O exertional chest pain which resolves with rest.  She c/o bradycardia and palpitations with dizziness primarily at night. Event monitor did not reveal any roberto / tachy arrhythmias. She continues to smoke, but is trying to transition to electronic cigarettes. Advised smoking cessation. TANGELA today Sinus rhythm - sinus tach, rare PVC, appropriate heart rate and blood pressure response. No ischemic changes. Advised to eliminate stimulants - stop smoking. Continue Midodrine and change positions slowly.   If she feels dizziness, to sit down to avoid syncopal episode. She has appointment with neurology today. Diagnoses and all orders for this visit:    1. Palpitations  -     EXERCISE CARDIAC STRESS TEST; Future    2. History of pulmonary embolism    3. Tobacco abuse counseling    4. Dizziness    5. History of DVT of lower extremity    6. Bradycardia    7. Other specified hypotension    8. Chest pain, unspecified type        Pertinent laboratory and test data reviewed and discussed with patient. See patient instructions also for other medical advice given    There are no discontinued medications. Follow-up and Dispositions    · Return in about 6 months (around 1/22/2020), or if symptoms worsen or fail to improve. I have independently evaluated and examined the patient. Patient continues to have symptoms of dizziness and palpitations as well as concerns about bradycardia with heart rate in 50s. Event monitor was unremarkable. Will check a treadmill stress test to evaluate heart rate blood pressure and EKG responses to exercise. All relevant labs and testing data's are reviewed. Care plan discussed and updated after review.   Lashae Briceno NP

## 2019-07-22 NOTE — PATIENT INSTRUCTIONS

## 2019-07-22 NOTE — PROGRESS NOTES
1. Have you been to the ER, urgent care clinic since your last visit? Hospitalized since your last visit? Yes where: [facility]    2. Have you seen or consulted any other health care providers outside of the [address] [place] [address] since your last visit? Include any pap smears or colon screening. Yes where: Neurologist    3. Since your last visit, have you had any of the following symptoms? chest pains, palpitations and dizziness. 4.  Have you had any blood work, X-rays or cardiac testing? No         5. Where do you normally have your labs drawn? [facility]    6. Do you need any refills today?    No

## 2019-07-29 RX ORDER — LEVOTHYROXINE SODIUM 137 UG/1
TABLET ORAL
Qty: 30 TAB | Refills: 2 | Status: SHIPPED | OUTPATIENT
Start: 2019-07-29 | End: 2019-08-21 | Stop reason: SDUPTHER

## 2019-07-31 ENCOUNTER — OFFICE VISIT (OUTPATIENT)
Dept: FAMILY MEDICINE CLINIC | Age: 59
End: 2019-07-31

## 2019-07-31 VITALS
BODY MASS INDEX: 30.49 KG/M2 | OXYGEN SATURATION: 99 % | HEART RATE: 62 BPM | SYSTOLIC BLOOD PRESSURE: 108 MMHG | DIASTOLIC BLOOD PRESSURE: 78 MMHG | HEIGHT: 70 IN | WEIGHT: 213 LBS | RESPIRATION RATE: 14 BRPM | TEMPERATURE: 98.1 F

## 2019-07-31 DIAGNOSIS — F32.2 CURRENT SEVERE EPISODE OF MAJOR DEPRESSIVE DISORDER WITHOUT PSYCHOTIC FEATURES WITHOUT PRIOR EPISODE (HCC): ICD-10-CM

## 2019-07-31 DIAGNOSIS — R00.2 PALPITATIONS: Primary | ICD-10-CM

## 2019-07-31 DIAGNOSIS — R42 DIZZINESS: ICD-10-CM

## 2019-07-31 RX ORDER — SERTRALINE HYDROCHLORIDE 50 MG/1
50 TABLET, FILM COATED ORAL DAILY
Qty: 30 TAB | Refills: 3 | Status: SHIPPED | OUTPATIENT
Start: 2019-07-31 | End: 2019-08-21 | Stop reason: SDUPTHER

## 2019-07-31 NOTE — PATIENT INSTRUCTIONS
Learning about Antidepressants  What are antidepressants? Antidepressants are medicines that change the levels of some chemicals in the brain. They can help affect moods. There are different types that work on brain chemicals in different ways. These medicines can help treat depression. They are also used for anxiety, eating disorders, post-traumatic stress disorder, and chronic pain. What are some examples? Here are some examples of antidepressants. For each item in the list, the generic name is first, followed by any brand names. · amitriptyline  · bupropion (Wellbutrin)  · citalopram (Celexa)  · fluoxetine (Prozac)  · sertraline (Zoloft)  · venlafaxine (Effexor)  This is not a complete list of antidepressants. What are the side effects? Side effects may vary. They depend on the medicine you take. But common ones include:  · Nausea. · Dry mouth. · Loss of appetite. · Diarrhea or constipation. · Sexual problems. (These may include loss of desire or erection problems.)  · Headaches. · Trouble falling asleep. Or you may wake up a lot at night. · Weight gain. · Feeling nervous or on edge. · Feeling drowsy in the daytime. Problems with sexual arousal and a lack of interest in sex are common side effects. If this happens to you, talk to your doctor. There are other medicines that may help with these problems. Mild side effects may go away after you take the medicine for a few weeks. If the side effects bother you, talk with your doctor. He or she may prescribe a different medicine. Or the doctor may suggest ways to manage your side effects. How do you take antidepressants safely? If your doctor has prescribed antidepressants, here are some tips to help you get the most from your medicine. · Share your health history with your doctor. Tell your doctor about your other medicines and health conditions. This information can affect which antidepressant your doctor prescribes for you.  Tell your doctor if you or anyone in your family has had bipolar disorder or used antidepressants before. · Take your medicine as prescribed. It works best and has fewer side effects when you take it exactly as your doctor prescribed. If you miss a dose, and if it's not too late in the day, it's okay to take it. Don't double up doses. · Keep taking your medicine for a while. Taking it for at least 6 months after you feel better can help keep you from getting symptoms again. · Be prepared to try different medicines and doses. You may start to feel better within 1 to 3 weeks after you start the medicine. If you have not improved at all in 3 weeks, your doctor may increase your dose. Or you may need to try a different medicine. Over time, your doctor may need to adjust your dose again to manage your symptoms better. · Don't take any new medicines unless you talk to your doctor first.   Even common medicines like aspirin and some vitamins and herbs can cause problems if you use them while you take antidepressants. · Do not drink alcohol. It can make the side effects worse. · Don't stop taking your medicine on your own. Quitting antidepressants too quickly can cause withdrawal symptoms. It can also cause depression to return. If you are having a problem with your medicine or are ready to quit taking it, work with your doctor. He or she can help you to slowly reduce the dose over a span of a few weeks. · Call your doctor right away for serious side effects. Examples include:  ? Warning signs of suicide. These include talking or writing about death, giving away belongings, or withdrawing from family and friends. ? Manic behavior. This includes having very high energy, sleeping less than normal, being impulsive, or being grouchy or restless. How might you feel about taking antidepressants? You may feel nervous, relieved, or a little surprised that your doctor is talking to you about antidepressants.  And the thought of needing to take medicine for a long time can be scary. But whether you are depressed or you have another condition, you are taking a step to help yourself feel better. Follow-up care is a key part of your treatment and safety. Be sure to make and go to all appointments, and call your doctor if you are having problems. It's also a good idea to know your test results and keep a list of the medicines you take. Where can you learn more? Go to http://brian-cindy.info/. Enter A230 in the search box to learn more about \"Learning about Antidepressants. \"  Current as of: September 11, 2018  Content Version: 12.1  © 0339-9382 [employer]. Care instructions adapted under license by [employer] (which disclaims liability or warranty for this information). If you have questions about a medical condition or this instruction, always ask your healthcare professional. [address] any warranty or liability for your use of this information.

## 2019-07-31 NOTE — PROGRESS NOTES
Chief Complaint   Patient presents with    Nausea    Dizziness    Toe Pain       Pt states that she has a head MRI. C/o toe pain in both big toes. 1. Have you been to the ER, urgent care clinic since your last visit? Hospitalized since your last visit? No    2. Have you seen or consulted any other health care providers outside of the [address] [place] [address] since your last visit? Include any pap smears or colon screening.  No

## 2019-08-01 DIAGNOSIS — I82.401 ACUTE DEEP VEIN THROMBOSIS (DVT) OF RIGHT LOWER EXTREMITY, UNSPECIFIED VEIN (HCC): ICD-10-CM

## 2019-08-01 RX ORDER — RIVAROXABAN 20 MG/1
TABLET, FILM COATED ORAL
Qty: 30 TAB | Refills: 1 | Status: SHIPPED | OUTPATIENT
Start: 2019-08-01 | End: 2019-08-21 | Stop reason: SDUPTHER

## 2019-08-06 ENCOUNTER — TELEPHONE (OUTPATIENT)
Dept: FAMILY MEDICINE CLINIC | Age: 59
End: 2019-08-06

## 2019-08-06 NOTE — TELEPHONE ENCOUNTER
Patients insurance does not require referrals called patient had her verify  she has been made aware she does not need an insurance referral and can schedule her own appt if she like. She has been given Dr. Ancelmo Mg contact information.

## 2019-08-06 NOTE — TELEPHONE ENCOUNTER
Patient call requesting a referral to a podiatrist for her Lt Foot Big toe, Blister that is causing her nail to lift on her toe.  Please follow up with patient at [phone]

## 2019-08-19 ENCOUNTER — TELEPHONE (OUTPATIENT)
Dept: FAMILY MEDICINE CLINIC | Age: 59
End: 2019-08-19

## 2019-08-19 NOTE — TELEPHONE ENCOUNTER
Ms. Reinier Grace called to inform Dr. Alea Castellanos that the gastroenterologist believes she may have Postural orthostatic tachycardia syndrome (POTS). She would like for us to research where she can get tested for this on a tilt table. She said that Dr. Juice Hardin, Cardiologist has one in the office, but she doesn't know that they test for POTS. She would like a return call to the home number, [phone]. She said the NP at the gastro office told her they usually send to [address] [facility] for this particular testing. She wants to know if there would be something local around here.

## 2019-08-21 ENCOUNTER — OFFICE VISIT (OUTPATIENT)
Dept: FAMILY MEDICINE CLINIC | Age: 59
End: 2019-08-21

## 2019-08-21 VITALS
TEMPERATURE: 98 F | BODY MASS INDEX: 30.06 KG/M2 | HEIGHT: 70 IN | HEART RATE: 58 BPM | SYSTOLIC BLOOD PRESSURE: 120 MMHG | DIASTOLIC BLOOD PRESSURE: 64 MMHG | WEIGHT: 210 LBS | OXYGEN SATURATION: 98 % | RESPIRATION RATE: 12 BRPM

## 2019-08-21 DIAGNOSIS — R00.2 PALPITATIONS: ICD-10-CM

## 2019-08-21 DIAGNOSIS — I82.401 ACUTE DEEP VEIN THROMBOSIS (DVT) OF RIGHT LOWER EXTREMITY, UNSPECIFIED VEIN (HCC): Primary | ICD-10-CM

## 2019-08-21 DIAGNOSIS — F32.2 CURRENT SEVERE EPISODE OF MAJOR DEPRESSIVE DISORDER WITHOUT PSYCHOTIC FEATURES WITHOUT PRIOR EPISODE (HCC): ICD-10-CM

## 2019-08-21 DIAGNOSIS — R42 DIZZINESS: ICD-10-CM

## 2019-08-21 RX ORDER — MIDODRINE HYDROCHLORIDE 5 MG/1
TABLET ORAL 3 TIMES DAILY
COMMUNITY
End: 2019-08-21 | Stop reason: SDUPTHER

## 2019-08-21 RX ORDER — SERTRALINE HYDROCHLORIDE 50 MG/1
50 TABLET, FILM COATED ORAL DAILY
Qty: 30 TAB | Refills: 3 | Status: SHIPPED | OUTPATIENT
Start: 2019-08-21 | End: 2019-09-12 | Stop reason: ALTCHOICE

## 2019-08-21 RX ORDER — MIDODRINE HYDROCHLORIDE 5 MG/1
5 TABLET ORAL 3 TIMES DAILY
Qty: 90 TAB | Refills: 0 | Status: SHIPPED | OUTPATIENT
Start: 2019-08-21 | End: 2019-11-15 | Stop reason: SDUPTHER

## 2019-08-21 RX ORDER — LEVOTHYROXINE SODIUM 137 UG/1
TABLET ORAL
Qty: 30 TAB | Refills: 2 | Status: SHIPPED | OUTPATIENT
Start: 2019-08-21 | End: 2020-01-03

## 2019-08-21 NOTE — PROGRESS NOTES
Pt in office the for f/u depression and discuss possible dx POTS. 1. Have you been to the ER, urgent care clinic since your last visit? Hospitalized since your last visit? No    2. Have you seen or consulted any other health care providers outside of the [address] [place] [address] since your last visit? Include any pap smears or colon screening.  No

## 2019-08-21 NOTE — PROGRESS NOTES
Neda Mccord is a 63 y.o. female  presents for follow up of chronic illness. No new complaints. She needs refills. No Known Allergies  Outpatient Medications Marked as Taking for the 8/21/19 encounter (Office Visit) with Mariela Ybarra MD   Medication Sig Dispense Refill    midodrine (PROAMITINE) 5 mg tablet Take  by mouth three (3) times daily.  XARELTO 20 mg tab tablet PLEASE SEE ATTACHED FOR DETAILED DIRECTIONS 30 Tab 1    sertraline (ZOLOFT) 50 mg tablet Take 1 Tab by mouth daily. 30 Tab 3    levothyroxine (SYNTHROID) 137 mcg tablet TAKE ONE TABLET BY MOUTH DAILY (BEFORE BREAKFAST). 30 Tab 2    comprs. stocking,thigh,long,med (COMP. STOCKING,THIGH,LONG,MED.) misc 2 Packages by Does Not Apply route daily as needed (edema). Remove when lying down 2 Package 0    thiamine HCl (VITAMIN B-1 PO) Take  by mouth.  cyanocobalamin (VITAMIN B-12) 500 mcg tablet Take 500 mcg by mouth daily.  multivitamin (ONE A DAY) tablet Take 1 Tab by mouth two (2) times a day.  calcium citrate-vitamin D3 (UPCAL D) 500 mg-500 unit /5 gram powd Take 500 mg by mouth three (3) times daily.  BIOTIN PO Take  by mouth.  Cholecalciferol, Vitamin D3, (VITAMIN D3) 1,000 unit chew Take 2,000 Units by mouth daily.        Patient Active Problem List   Diagnosis Code    Hypothyroidism E03.9    DJD (degenerative joint disease) M19.90    DVT (deep venous thrombosis) ([facility]) I82.409    Morbid obesity ([facility] E66.01    HTN (hypertension) I10    FAINA (stress urinary incontinence, female) N39.3    GERD (gastroesophageal reflux disease) K21.9    CAMDEN on CPAP G47.33, Z99.89    History of pulmonary embolism Z86.711    History of DVT of lower extremity Z86.718    Bradycardia R00.1    History of gastric bypass Z98.84    Palpitations R00.2    History of hypertension Z86.79     Past Medical History:   Diagnosis Date    Arthritis     Chronic obstructive pulmonary disease (HCC)     hx pe    Coagulation disorder ([facility] [address]     clotting disorder    Hx of colonic polyps     HX OTHER MEDICAL     pulmonary embolism x 2    HX OTHER MEDICAL     PCOS    HX OTHER MEDICAL     Heart monitor 2015     Hypertension     no longer on meds since gastric bypass    Long term (current) use of anticoagulants     Osteopenia     PE (pulmonary thromboembolism) ([facility] [address] 2008    Plantar fasciitis of left foot     Tendinopathy Oct 2013    Right insertional Achilles tendinopathy    Thromboembolus ([facility] [address]     blood clot in left leg then lung in , then clot behind right knee 2009    Thromboembolus [facility])     also PE    Thyroid disease      Social History     Socioeconomic History    Marital status:      Spouse name: Not on file    Number of children: Not on file    Years of education: Not on file    Highest education level: Not on file   Tobacco Use    Smoking status: Current Every Day Smoker     Packs/day: 1.50     Years: 10.00     Pack years: 15.00     Types: Cigarettes     Last attempt to quit: 2013     Years since quittin.0    Smokeless tobacco: Never Used    Tobacco comment: vapor pen   Substance and Sexual Activity    Alcohol use: No     Alcohol/week: 3.3 standard drinks     Types: 2 Glasses of wine, 2 Standard drinks or equivalent per week     Comment: once weekly; quit     Drug use: No     Family History   Problem Relation Age of Onset    Diabetes Other     Hypertension Other     Heart Disease Other     Arthritis-osteo Other     Stroke Mother 66    Stroke Father 36    Heart Attack Neg Hx         Review of Systems   Constitutional: Negative for chills, fever, malaise/fatigue and weight loss. Eyes: Negative for blurred vision. Respiratory: Negative for shortness of breath and wheezing. Cardiovascular: Negative for chest pain. Gastrointestinal: Negative for nausea and vomiting. Musculoskeletal: Negative for myalgias. Skin: Negative for rash. Neurological: Negative for weakness. Vitals:    08/21/19 1055   BP: 120/64   Pulse: (!) 58   Resp: 12   Temp: 98 °F (36.7 °C)   SpO2: 98%   Weight: 210 lb (95.3 kg)   Height: 5' 10\" (1.778 m)   PainSc:   2   LMP: 12/31/2013       Physical Exam   Constitutional: She is oriented to person, place, and time and well-developed, well-nourished, and in no distress. Neck: Normal range of motion. Neck supple. No thyromegaly present. Cardiovascular: Normal rate, regular rhythm and normal heart sounds. Pulmonary/Chest: Effort normal and breath sounds normal.   Musculoskeletal: Normal range of motion. Neurological: She is alert and oriented to person, place, and time. Skin: Skin is warm and dry. Psychiatric: Mood, memory, affect and judgment normal.   Nursing note and vitals reviewed. Assessment/Plan      ICD-10-CM ICD-9-CM    1. Acute deep vein thrombosis (DVT) of right lower extremity, unspecified vein ([facility]) I82.401 453.40 rivaroxaban (XARELTO) 20 mg tab tablet   2. Current severe episode of major depressive disorder without psychotic features without prior episode ([facility]) F32.2 296.23 sertraline (ZOLOFT) 50 mg tablet   3. Palpitations R00.2 785.1 REFERRAL TO CARDIOLOGY   4. Dizziness R42 780.4      I have discussed the diagnosis with the patient and the intended plan of care as seen in the above orders. The patient has received an after-visit summary and questions were answered concerning future plans. I have discussed medication, side effects, and warnings with the patient in detail. The patient verbalized understanding and is in agreement with the plan of care. The patient will contact the office with any additional concerns.     lab results and schedule of future lab studies reviewed with patient    Mery Hinkle MD

## 2019-08-21 NOTE — PATIENT INSTRUCTIONS
Palpitations: Care Instructions  Your Care Instructions    Heart palpitations are the uncomfortable sensation that your heart is beating fast or irregularly. You might feel pounding or fluttering in your chest. It might feel like your heart is skipping a beat. Although palpitations may be caused by a heart problem, they also occur because of stress, fatigue, or use of alcohol, caffeine, or nicotine. Many medicines, including diet pills, antihistamines, decongestants, and some herbal products, can cause heart palpitations. Nearly everyone has palpitations from time to time. Depending on your symptoms, your doctor may need to do more tests to try to find the cause of your palpitations. Follow-up care is a key part of your treatment and safety. Be sure to make and go to all appointments, and call your doctor if you are having problems. It's also a good idea to know your test results and keep a list of the medicines you take. How can you care for yourself at home? · Avoid caffeine, nicotine, and excess alcohol. · Do not take illegal drugs, such as methamphetamines and cocaine. · Do not take weight loss or diet medicines unless you talk with your doctor first.  · Get plenty of sleep. · Do not overeat. · If you have palpitations again, take deep breaths and try to relax. This may slow a racing heart. · If you start to feel lightheaded, lie down to avoid injuries that might result if you pass out and fall down. · Keep a record of your palpitations and bring it to your next doctor's appointment. Write down:  ? The date and time. ? Your pulse. (If your heart is beating fast, it may be hard to count your pulse.)  ? What you were doing when the palpitations started. ? How long the palpitations lasted. ? Any other symptoms. · If an activity causes palpitations, slow down or stop. Talk to your doctor before you do that activity again. · Take your medicines exactly as prescribed.  Call your doctor if you think you are having a problem with your medicine. When should you call for help? Call 911 anytime you think you may need emergency care. For example, call if:    · You passed out (lost consciousness).     · You have symptoms of a heart attack. These may include:  ? Chest pain or pressure, or a strange feeling in the chest.  ? Sweating. ? Shortness of breath. ? Pain, pressure, or a strange feeling in the back, neck, jaw, or upper belly or in one or both shoulders or arms. ? Lightheadedness or sudden weakness. ? A fast or irregular heartbeat. After you call 911, the  may tell you to chew 1 adult-strength or 2 to 4 low-dose aspirin. Wait for an ambulance. Do not try to drive yourself.     · You have symptoms of a stroke. These may include:  ? Sudden numbness, tingling, weakness, or loss of movement in your face, arm, or leg, especially on only one side of your body. ? Sudden vision changes. ? Sudden trouble speaking. ? Sudden confusion or trouble understanding simple statements. ? Sudden problems with walking or balance. ? A sudden, severe headache that is different from past headaches.    Call your doctor now or seek immediate medical care if:    · You have heart palpitations and:  ? Are dizzy or lightheaded, or you feel like you may faint. ? Have new or increased shortness of breath.    Watch closely for changes in your health, and be sure to contact your doctor if:    · You continue to have heart palpitations. Where can you learn more? Go to http://brian-cindy.info/. Enter R508 in the search box to learn more about \"Palpitations: Care Instructions. \"  Current as of: July 22, 2018  Content Version: 12.1  © 7958-4056 [employer]. Care instructions adapted under license by [employer] (which disclaims liability or warranty for this information).  If you have questions about a medical condition or this instruction, always ask your healthcare professional. [employer] disclaims any warranty or liability for your use of this information.

## 2019-08-23 ENCOUNTER — HOSPITAL ENCOUNTER (OUTPATIENT)
Dept: LAB | Age: 59
Discharge: HOME | End: 2019-08-23

## 2019-08-23 ENCOUNTER — OFFICE VISIT (OUTPATIENT)
Dept: CARDIOLOGY CLINIC | Age: 59
End: 2019-08-23

## 2019-08-23 VITALS
DIASTOLIC BLOOD PRESSURE: 91 MMHG | BODY MASS INDEX: 30.06 KG/M2 | WEIGHT: 210 LBS | HEART RATE: 86 BPM | HEIGHT: 70 IN | SYSTOLIC BLOOD PRESSURE: 114 MMHG

## 2019-08-23 DIAGNOSIS — R07.9 CHEST PAIN, UNSPECIFIED TYPE: ICD-10-CM

## 2019-08-23 DIAGNOSIS — Z86.711 HISTORY OF PULMONARY EMBOLISM: ICD-10-CM

## 2019-08-23 DIAGNOSIS — Z71.6 TOBACCO ABUSE COUNSELING: ICD-10-CM

## 2019-08-23 DIAGNOSIS — R53.1 WEAKNESS: ICD-10-CM

## 2019-08-23 DIAGNOSIS — Z98.84 HISTORY OF GASTRIC BYPASS: ICD-10-CM

## 2019-08-23 DIAGNOSIS — G90.A POTS (POSTURAL ORTHOSTATIC TACHYCARDIA SYNDROME): Primary | ICD-10-CM

## 2019-08-23 LAB — XX-LABCORP SPECIMEN COL,LCBCF: NORMAL

## 2019-08-23 PROCEDURE — 99001 SPECIMEN HANDLING PT-LAB: CPT

## 2019-08-23 NOTE — PATIENT INSTRUCTIONS
Medications Discontinued During This Encounter   Medication Reason    varenicline (CHANTIX STARTER NANI) 0.5 mg (11)- 1 mg (42) DsPk     varenicline (CHANTIX) 1 mg tablet     BIOTIN PO Therapy Completed          Orthostatic Hypotension: Care Instructions  Your Care Instructions    Orthostatic hypotension is a quick drop in blood pressure. It happens when you get up from sitting or lying down. You may feel faint, lightheaded, or dizzy. When a person sits up or stands up, the body changes the way it pumps blood. This can slow the flow of blood to the brain for a very short time. And that can make you feel lightheaded. Many medicines can cause this problem, especially in older people. Lack of fluids (dehydration) or illnesses such as diabetes or heart disease also can cause it. Follow-up care is a key part of your treatment and safety. Be sure to make and go to all appointments, and call your doctor if you are having problems. It's also a good idea to know your test results and keep a list of the medicines you take. How can you care for yourself at home? · Tell your doctor about any problems you have with your medicines. · If your doctor prescribes medicine to help prevent a low blood pressure problem, take it exactly as prescribed. Call your doctor if you think you are having a problem with your medicine. · Drink plenty of fluids, enough so that your urine is light yellow or clear like water. Choose water and other caffeine-free clear liquids. If you have kidney, heart, or liver disease and have to limit fluids, talk with your doctor before you increase the amount of fluids you drink. · Limit or avoid alcohol and caffeine. · Get up slowly from bed or after sitting for a long time. If you are in bed, roll to your side and swing your legs over the edge of the bed and onto the floor. Push your body up to a sitting position. Wait for a while before you slowly stand up.  If you are dizzy or lightheaded, sit or lie down. When should you call for help? Call 911 anytime you think you may need emergency care. For example, call if:    · You passed out (lost consciousness).    Watch closely for changes in your health, and be sure to contact your doctor if:    · You do not get better as expected. Where can you learn more? Go to http://brian-cindy.info/. Enter S954 in the search box to learn more about \"Orthostatic Hypotension: Care Instructions. \"  Current as of: July 22, 2018  Content Version: 12.1  © 1392-9956 [employer]. Care instructions adapted under license by [employer] (which disclaims liability or warranty for this information). If you have questions about a medical condition or this instruction, always ask your healthcare professional. [address] any warranty or liability for your use of this information.

## 2019-08-23 NOTE — PROGRESS NOTES
HISTORY OF PRESENT ILLNESS  Luis Eduardo Turner is a 63 y.o. female. 4/19 history of syncope about 2014 approximately. It happened after gastric bypass surgery when she had lost a lot of weight. 4/19 history of CP lower sternal, radiates to the back. No relation to activity or food. Lasting many hours. No radiation no associated nausea vomiting diaphoresis. Feels harder to breathe during the episode. No medicines. Spontaneous relief in few hours. 7/2019 C/O substernal chest pain with exertion. She continues to have dizziness and palpitations in the evenings. She denies having syncopal episode. Palpitations    The history is provided by the patient. This is a new problem. The current episode started more than 1 week ago (few weaks, feels a hard beat but the rate is still 50-60.). Associated symptoms include chest pain and dizziness. Pertinent negatives include no fever, no malaise/fatigue, no claudication, no orthopnea, no PND, no nausea, no vomiting, no headaches, no cough and no shortness of breath. Slow Heart Rate   The history is provided by the patient and medical records. This is a new (46s) problem. The current episode started more than 1 week ago. Associated symptoms include chest pain. Pertinent negatives include no headaches and no shortness of breath. Dizziness   The history is provided by the patient. This is a new problem. The current episode started more than 1 week ago (yrs; see comments also). The problem occurs rarely (once in a few months). The problem has not changed (almost daily AMs) since onset. Associated symptoms include chest pain. Pertinent negatives include no headaches and no shortness of breath. Exacerbated by: driving. The symptoms are relieved by rest (pull over if driving). Review of Systems   Constitutional: Negative for chills, fever, malaise/fatigue and weight loss. HENT: Negative for nosebleeds. Eyes: Negative for discharge.    Respiratory: Negative for cough, shortness of breath and wheezing. Cardiovascular: Positive for chest pain and palpitations. Negative for orthopnea, claudication, leg swelling and PND. Gastrointestinal: Negative for diarrhea, nausea and vomiting. Genitourinary: Negative for dysuria and hematuria. Musculoskeletal: Negative for joint pain. Skin: Negative for rash. Neurological: Positive for dizziness. Negative for seizures, loss of consciousness and headaches. Endo/Heme/Allergies: Negative for polydipsia. Does not bruise/bleed easily. Psychiatric/Behavioral: Negative for depression and substance abuse. The patient does not have insomnia.       No Known Allergies    Past Medical History:   Diagnosis Date    Arthritis     Chronic obstructive pulmonary disease (HCC)     hx pe    Coagulation disorder (HCC)     clotting disorder    Hx of colonic polyps     HX OTHER MEDICAL     pulmonary embolism x 2    HX OTHER MEDICAL     PCOS    HX OTHER MEDICAL     Heart monitor 2015     Hypertension     no longer on meds since gastric bypass    Long term (current) use of anticoagulants     Osteopenia     PE (pulmonary thromboembolism) ([address]     Plantar fasciitis of left foot     Tendinopathy Oct 2013    Right insertional Achilles tendinopathy    Thromboembolus ([address]     blood clot in left leg then lung in , then clot behind right knee     Thromboembolus ([address]     also PE    Thyroid disease        Family History   Problem Relation Age of Onset    Diabetes Other     Hypertension Other     Heart Disease Other     Arthritis-osteo Other     Stroke Mother 66    Stroke Father 36    Heart Attack Neg Hx        Social History     Tobacco Use    Smoking status: Current Every Day Smoker     Packs/day: 1.50     Years: 10.00     Pack years: 15.00     Types: Cigarettes     Last attempt to quit: 2013     Years since quittin.0    Smokeless tobacco: Never Used    Tobacco comment: vapor pen   Substance Use Topics  Alcohol use: No     Alcohol/week: 3.3 standard drinks     Types: 2 Glasses of wine, 2 Standard drinks or equivalent per week     Comment: once weekly; quit 2018    Drug use: No        Current Outpatient Medications   Medication Sig    midodrine (PROAMITINE) 5 mg tablet Take 1 Tab by mouth three (3) times daily.  rivaroxaban (XARELTO) 20 mg tab tablet PLEASE SEE ATTACHED FOR DETAILED DIRECTIONS    sertraline (ZOLOFT) 50 mg tablet Take 1 Tab by mouth daily.  levothyroxine (SYNTHROID) 137 mcg tablet TAKE ONE TABLET BY MOUTH DAILY (BEFORE BREAKFAST).  thiamine HCl (VITAMIN B-1 PO) Take  by mouth.  cyanocobalamin (VITAMIN B-12) 500 mcg tablet Take 500 mcg by mouth daily.  multivitamin (ONE A DAY) tablet Take 1 Tab by mouth two (2) times a day.  calcium citrate-vitamin D3 (UPCAL D) 500 mg-500 unit /5 gram powd Take 500 mg by mouth three (3) times daily.  BIOTIN PO Take  by mouth.  Cholecalciferol, Vitamin D3, (VITAMIN D3) 1,000 unit chew Take 2,000 Units by mouth daily.  comprs. stocking,thigh,long,med (COMP. STOCKING,THIGH,LONG,MED.) misc 2 Packages by Does Not Apply route daily as needed (edema). Remove when lying down     No current facility-administered medications for this visit.          Past Surgical History:   Procedure Laterality Date    HX DILATION AND CURETTAGE      ablation    HX DILATION AND CURETTAGE      age 17    HX GASTRIC BYPASS  1/15/2014    HX TUBAL LIGATION  1996       Visit Vitals  /79 (BP 1 Location: Left arm, BP Patient Position: Supine)   Pulse 65   Ht 5' 10\" (1.778 m)   Wt 95.3 kg (210 lb)   LMP 12/31/2013   BMI 30.13 kg/m²     Vitals:    08/23/19 0743 08/23/19 0751 08/23/19 0756   BP: 116/79 116/79 (!) 114/91   BP 1 Location: Left arm Left arm Left arm   BP Patient Position: Sitting Supine Standing   Pulse: 68 65 86   Weight: 95.3 kg (210 lb)     Height: 5' 10\" (1.778 m)           Diagnostic Studies:  I have reviewed the relevant tests done on the patient and show as follows  EKG tracings reviewed by me today. EKG Results     None        XR Results (most recent):  Results from Hospital Encounter encounter on 12/09/13   XR CHEST PA LAT    Narrative History: Preop    PA AND LATERAL CHEST 2 VIEWS    CPT: 15764    The heart and lungs and bony structures appear unremarkable for a patient of  this age. Impression IMPRESSION:    Normal chest.       No flowsheet data found. 4/19 echo  Interpretation Summary        · Normal cavity size, systolic function (ejection fraction normal) and diastolic function. Mild concentric hypertrophy. The estimated ejection fraction is 56 - 60%. No regional wall motion abnormality noted. · Normal right ventricular size and function. · Mild tricuspid valve regurgitation is present. Pulmonary arterial systolic pressure is 16.7 mmHg. There is no evidence of pulmonary hypertension. · Mildly elevated central venous pressure (5-10 mmHg); IVC diameter is less than 21 mm and collapses less than 50% with respiration. Comparison Study Information   Prior Study   There is a prior study available for comparison that was performed on 11/25/2014. As compared to the previous study, there are no significant changes. 7/19 TMT  Interpretation Summary     · Baseline ECG: Normal sinus rhythm. · Negative stress test.  · Low risk Duke treadmill score. · Appropriate heart rate and blood pressure response to exercise. · Appropriate heart rate recovery   3/15 cardiac cath   1. No significant coronary artery disease  2. Normal left ventricular systolic function with LV EF 60%  3. Normal left ventricular end-diastolic pressure. RECOMMENDATIONS:   1. Risk factor modification including exercise. 2. Evaluation for noncardiac etiologies of chest discomfort. CT CTA CHEST PULMONARY3/25/2019  [facility]  Result Impression     1. No CT evidence of pulmonary thromboembolism. 2. Calcification in the LAD coronary artery.      5/2019 - Event monitor   No evidence of severe roberto or tachy arrhythmias. Patient markers associated with sinus rhythm    Ms. Judit Haas has a reminder for a \"due or due soon\" health maintenance. I have asked that she contact her primary care provider for follow-up on this health maintenance. Physical Exam   Constitutional: She is oriented to person, place, and time. She appears well-developed and well-nourished. No distress. Tall stature   HENT:   Head: Normocephalic and atraumatic. Mouth/Throat: Normal dentition. Eyes: Right eye exhibits no discharge. Left eye exhibits no discharge. No scleral icterus. Neck: Neck supple. No JVD present. Carotid bruit is not present. No thyromegaly present. Cardiovascular: Normal rate, regular rhythm, S1 normal, S2 normal, normal heart sounds and intact distal pulses. Exam reveals no gallop and no friction rub. No murmur heard. Pulmonary/Chest: Effort normal and breath sounds normal. She has no wheezes. She has no rales. Abdominal: Soft. She exhibits no mass. There is no tenderness. Musculoskeletal: She exhibits no edema. Lymphadenopathy:        Right cervical: No superficial cervical adenopathy present. Left cervical: No superficial cervical adenopathy present. Neurological: She is alert and oriented to person, place, and time. Skin: Skin is warm and dry. No rash noted. Psychiatric: She has a normal mood and affect. Her behavior is normal.       ASSESSMENT and PLAN    Patient advised to stop smoking to reduce future cardiovascular events. Patient seems to have POTS already by orthostatic changes in the office today. As discussed with her and daughter in the office, we would not need to do tilt table for diagnosis yet. She was advised and strongly recommended to use the muscle training to increase the muscle tone and strength including some resistance exercises. Joining yoga may be good for overall muscle tone as well as balance.   Plenty of water intake and continue the present medications. I will discontinue biotin as it may not be absolutely necessary. Will check vitamin D and B12 levels to rule out any significant deficiency or toxicity. Diagnoses and all orders for this visit:    1. POTS (postural orthostatic tachycardia syndrome)  -     VITAMIN D, 25 HYDROXY; Future  -     VITAMIN B12; Future    2. History of pulmonary embolism    3. Tobacco abuse counseling    4. History of gastric bypass  -     VITAMIN D, 25 HYDROXY; Future  -     VITAMIN B12; Future    5. Weakness  -     VITAMIN D, 25 HYDROXY; Future  -     VITAMIN B12; Future    6. Chest pain, unspecified type        Pertinent laboratory and test data reviewed and discussed with patient. See patient instructions also for other medical advice given    Medications Discontinued During This Encounter   Medication Reason    varenicline (CHANTIX STARTER NANI) 0.5 mg (11)- 1 mg (42) DsPk     varenicline (CHANTIX) 1 mg tablet     BIOTIN PO Therapy Completed       Follow-up and Dispositions    · Return in about 3 months (around 11/23/2019), or if symptoms worsen or fail to improve.

## 2019-08-23 NOTE — PROGRESS NOTES
1. Have you been to the ER, urgent care clinic since your last visit? Hospitalized since your last visit?     no    2. Have you seen or consulted any other health care providers outside of the [address] [place] [address] since your last visit? Include any pap smears or colon screening. Yes Where: pcp     3. Since your last visit, have you had any of the following symptoms? chest pains, palpitations, shortness of breath and dizziness. 4.  Have you had any blood work, X-rays or cardiac testing? No       5. Where do you normally have your labs drawn? 6. Do you need any refills today?   no      Patient states she get dizzy in the morning and evenings.

## 2019-08-24 LAB
25(OH)D3+25(OH)D2 SERPL-MCNC: 38.4 NG/ML (ref 30–100)
VIT B12 SERPL-MCNC: 1126 PG/ML (ref 232–1245)

## 2019-08-26 ENCOUNTER — TELEPHONE (OUTPATIENT)
Dept: CARDIOLOGY CLINIC | Age: 59
End: 2019-08-26

## 2019-08-26 ENCOUNTER — TELEPHONE (OUTPATIENT)
Dept: FAMILY MEDICINE CLINIC | Age: 59
End: 2019-08-26

## 2019-08-26 DIAGNOSIS — R00.2 PALPITATIONS: Primary | ICD-10-CM

## 2019-08-26 NOTE — TELEPHONE ENCOUNTER
[facility] [facility] clinic treats POTS referral has been pended review and sign if appropriate. Does patient need an appointment before referral is placed.

## 2019-08-26 NOTE — TELEPHONE ENCOUNTER
Left a message on patients voicemail stating she can continue her vitamins that she was taking since her labs were normal.

## 2019-08-26 NOTE — TELEPHONE ENCOUNTER
Ventura Damon MD  Deri Rater.             Please let patient know that her vitamin D and vitamin B12 levels are normal and she can continue present doses of vitamins. Previous Messages      ----- Message -----   From: Amandeep Garner Lab Results In   Sent: 8/26/2019  12:04 AM EDT   To:  Author MD Chuy

## 2019-08-26 NOTE — TELEPHONE ENCOUNTER
Patient states she saw her cardiologist and was diagnosed with POTS. She states she was instructed to contact Dr. Ladonna Spangler to be referred to a specialist for this. She states she thinks there is someone who treats this at [facility]. I let her know I would send a message to the nurse and see what Dr. Ladonna Spangler recommended or is he needed to see her to f/u on this.

## 2019-09-06 ENCOUNTER — TELEPHONE (OUTPATIENT)
Dept: FAMILY MEDICINE CLINIC | Age: 59
End: 2019-09-06

## 2019-09-06 NOTE — TELEPHONE ENCOUNTER
Michelle called to check on a clearance form about Xarelto for an upcoming procedure. She can be reached at [phone].

## 2019-09-11 RX ORDER — VARENICLINE TARTRATE 1 MG/1
TABLET, FILM COATED ORAL
Qty: 180 TAB | Refills: 0 | OUTPATIENT
Start: 2019-09-11

## 2019-09-12 ENCOUNTER — OFFICE VISIT (OUTPATIENT)
Dept: FAMILY MEDICINE CLINIC | Age: 59
End: 2019-09-12

## 2019-09-12 VITALS
HEIGHT: 70 IN | SYSTOLIC BLOOD PRESSURE: 122 MMHG | OXYGEN SATURATION: 99 % | HEART RATE: 62 BPM | WEIGHT: 209 LBS | BODY MASS INDEX: 29.92 KG/M2 | DIASTOLIC BLOOD PRESSURE: 78 MMHG | RESPIRATION RATE: 12 BRPM

## 2019-09-12 DIAGNOSIS — Z23 ENCOUNTER FOR IMMUNIZATION: ICD-10-CM

## 2019-09-12 DIAGNOSIS — F51.01 PRIMARY INSOMNIA: ICD-10-CM

## 2019-09-12 DIAGNOSIS — R42 DIZZINESS: Primary | ICD-10-CM

## 2019-09-12 DIAGNOSIS — Z71.6 TOBACCO ABUSE COUNSELING: ICD-10-CM

## 2019-09-12 DIAGNOSIS — Z86.718 HISTORY OF DVT OF LOWER EXTREMITY: ICD-10-CM

## 2019-09-12 DIAGNOSIS — G89.29 OTHER CHRONIC PAIN: ICD-10-CM

## 2019-09-12 RX ORDER — TRAZODONE HYDROCHLORIDE 100 MG/1
100 TABLET ORAL
Qty: 30 TAB | Refills: 1 | Status: SHIPPED | OUTPATIENT
Start: 2019-09-12 | End: 2019-10-30 | Stop reason: SDUPTHER

## 2019-09-12 RX ORDER — TRAMADOL HYDROCHLORIDE 50 MG/1
50 TABLET ORAL
Qty: 30 TAB | Refills: 0 | Status: SHIPPED | OUTPATIENT
Start: 2019-09-12 | End: 2020-02-11 | Stop reason: SDUPTHER

## 2019-09-12 RX ORDER — VARENICLINE TARTRATE 25 MG
0.5 KIT ORAL
Qty: 1 DOSE PACK | Refills: 0 | Status: SHIPPED | OUTPATIENT
Start: 2019-09-12 | End: 2019-11-15

## 2019-09-12 NOTE — PROGRESS NOTES
Berna Chanel is a 63 y.o. female  presents for follow up. She needs meds for sleep DME walker and KERRIE hose. She wants to stop smoking and chronic joint pain. HPI:   Patient has osteoarthritis and chronic pain , primarily affecting the joints and back. Pain control:           Current : reasonably well controlled   5/10           Worst pain over last week        7/10           Least pain over the last week   5/10  Activities of Daily Living:            reasonably well controlled            Are you able to do your normal daily activities?   intermittent or no  Patient Goals            Functional:  yes            Pain: less  Adverse side effects:             Since starting your pain medicine are you experiencing any of the following?              ( Examples: Constipation, fatigue, lapses in memory, depression or sexual                        Dysfunction)   No   Is a Pain management Contract in the patient's chart? no  Aberrant behaviors:              none(Early refill requests, lost prescriptions, lost medicine)  Pill count:             Is it consistent with patient's prescription? {Yes/No:[id number]:L: \"yes\"  Last urine drug screen:     [facility] Prescription Monitoring Program check performed:  no        Treatment Care Team:  Patient Care Team:  Jesus Nichols MD as PCP - General (Family Practice)  Jaime Driscoll MD (Surgical Oncology)  Elvia Berry MD as Physician (General Surgery)  Follow up contact scheduled for 1-4 weeks: yes      No Known Allergies  Outpatient Medications Marked as Taking for the 9/12/19 encounter (Office Visit) with Jesus Nichols MD   Medication Sig Dispense Refill    midodrine (PROAMITINE) 5 mg tablet Take 1 Tab by mouth three (3) times daily. 90 Tab 0    rivaroxaban (XARELTO) 20 mg tab tablet PLEASE SEE ATTACHED FOR DETAILED DIRECTIONS 30 Tab 1    sertraline (ZOLOFT) 50 mg tablet Take 1 Tab by mouth daily.  30 Tab 3    levothyroxine (SYNTHROID) 137 mcg tablet TAKE ONE TABLET BY MOUTH DAILY (BEFORE BREAKFAST). 30 Tab 2    thiamine HCl (VITAMIN B-1 PO) Take  by mouth.  cyanocobalamin (VITAMIN B-12) 500 mcg tablet Take 500 mcg by mouth daily.  multivitamin (ONE A DAY) tablet Take 1 Tab by mouth two (2) times a day.  calcium citrate-vitamin D3 (UPCAL D) 500 mg-500 unit /5 gram powd Take 500 mg by mouth three (3) times daily.  Cholecalciferol, Vitamin D3, (VITAMIN D3) 1,000 unit chew Take 2,000 Units by mouth daily.        Patient Active Problem List   Diagnosis Code    Hypothyroidism E03.9    DJD (degenerative joint disease) M19.90    DVT [facility]deep venous thrombosis) ([facility]) I82.409    Morbid obesity ([facility] E66.01    HTN (hypertension) I10    FAINA (stress urinary incontinence, female) N39.3    GERD (gastroesophageal reflux disease) K21.9    CAMDEN on CPAP G47.33, Z99.89    History of pulmonary embolism Z86.711    History of DVT of lower extremity Z86.718    Bradycardia R00.1    History of gastric bypass Z98.84    Palpitations R00.2    History of hypertension Z86.79    POTS (postural orthostatic tachycardia syndrome) R00.0, I95.1    Tobacco abuse counseling Z71.6    Chest pain R07.9    Weakness R53.1     Past Medical History:   Diagnosis Date    Arthritis     Chronic obstructive pulmonary disease (HCC)     hx pe    Coagulation disorder (HCC)     clotting disorder    Hx of colonic polyps     HX OTHER MEDICAL     pulmonary embolism x 2    HX OTHER MEDICAL     PCOS    HX OTHER MEDICAL     Heart monitor 1/2015     Hypertension     no longer on meds since gastric bypass    Long term (current) use of anticoagulants     Osteopenia     PE (pulmonary thromboembolism) ([facility] 2008    Plantar fasciitis of left foot     Tendinopathy Oct 2013    Right insertional Achilles tendinopathy    Thromboembolus ([facility]     blood clot in left leg then lung in 2007, then clot behind right knee 2009    Thromboembolus [facility])     also PE    Thyroid disease      Social History     Socioeconomic History    Marital status:      Spouse name: Not on file    Number of children: Not on file    Years of education: Not on file    Highest education level: Not on file   Tobacco Use    Smoking status: Current Every Day Smoker     Packs/day: 1.50     Years: 10.00     Pack years: 15.00     Types: Cigarettes     Last attempt to quit: 2013     Years since quittin.1    Smokeless tobacco: Never Used    Tobacco comment: vapor pen   Substance and Sexual Activity    Alcohol use: No     Alcohol/week: 3.3 standard drinks     Types: 2 Glasses of wine, 2 Standard drinks or equivalent per week     Comment: once weekly; quit     Drug use: No     Family History   Problem Relation Age of Onset    Diabetes Other     Hypertension Other     Heart Disease Other     Arthritis-osteo Other     Stroke Mother 66    Stroke Father 36    Heart Attack Neg Hx         Review of Systems   Constitutional: Negative for chills, fever, malaise/fatigue and weight loss. Eyes: Negative for blurred vision. Respiratory: Negative for shortness of breath and wheezing. Cardiovascular: Negative for chest pain. Gastrointestinal: Negative for nausea and vomiting. Musculoskeletal: Negative for falls and myalgias. Skin: Negative for rash. Neurological: Negative for weakness. Vitals:    19 1041   BP: 122/78   Pulse: 62   Resp: 12   SpO2: 99%   Weight: 209 lb (94.8 kg)   Height: 5' 10\" (1.778 m)   PainSc:   0 - No pain   LMP: 2013       Physical Exam   Constitutional: She is oriented to person, place, and time and well-developed, well-nourished, and in no distress. Neck: Normal range of motion. Neck supple. No thyromegaly present. Cardiovascular: Normal rate, regular rhythm and normal heart sounds. Pulmonary/Chest: Effort normal and breath sounds normal.   Musculoskeletal: Normal range of motion. Neurological: She is alert and oriented to person, place, and time. Skin: Skin is warm and dry. Psychiatric: Mood, memory, affect and judgment normal.   Nursing note and vitals reviewed. Assessment/Plan      ICD-10-CM ICD-9-CM    1. Dizziness R42 780.4 AMB SUPPLY ORDER   2. Tobacco abuse counseling Z71.6 V65.42 varenicline (CHANTIX STARTER NANI) 0.5 mg (11)- 1 mg (42) DsPk     305.1    3. History of DVT of lower extremity Z86.718 V12.51 AMB SUPPLY ORDER   4. Primary insomnia F51.01 307.42 traZODone (DESYREL) 100 mg tablet   5. Encounter for immunization Z23 V03.89 INFLUENZA VIRUS VAC QUAD,SPLIT,PRESV FREE SYRINGE IM      PNEUMOCOCCAL CONJ VACCINE 13 VALENT IM   6. Other chronic pain G89.29 338.29 traMADol (ULTRAM) 50 mg tablet     I have discussed the diagnosis with the patient and the intended plan of care as seen in the above orders. The patient has received an after-visit summary and questions were answered concerning future plans. I have discussed medication, side effects, and warnings with the patient in detail. The patient verbalized understanding and is in agreement with the plan of care. The patient will contact the office with any additional concerns.     lab results and schedule of future lab studies reviewed with patient    Allison Torres MD

## 2019-09-12 NOTE — PROGRESS NOTES
Nadege Thrasher 1960 in office to receive FLULAVAL AND PREVNAR 13. VIS and consent given. Questions and concerns answered. Pt received vaccine(s) in FLU-LD PNEUM-RD. Pt declined waiting in office for recommended 20 minutes. Advised if any adverse reaction, such as redness, hives, swelling, trouble breathing, or any other concerning/alarming sxs arise pt should seek treatment in ER. Pt expressed clear understanding and had no further questions.

## 2019-09-12 NOTE — PATIENT INSTRUCTIONS
Deciding About Using Medicines To Quit Smoking  How can you decide about using medicines to quit smoking? What are the medicines you can use? Your doctor may prescribe varenicline (Chantix) or bupropion (Zyban). These medicines can help you cope with cravings for tobacco. They are pills that don't contain nicotine. You also can use nicotine replacement products. These do contain nicotine. There are many types. · Gum and lozenges slowly release nicotine into your mouth. · Patches stick to your skin. They slowly release nicotine into your bloodstream.  · An inhaler has a mcleod that contains nicotine. You breathe in a puff of nicotine vapor through your mouth and throat. · Nasal spray releases a mist that contains nicotine. What are key points about this decision? · Using medicines can double your chances of quitting smoking. They can ease cravings and withdrawal symptoms. · Getting counseling along with using medicine can raise your chances of quitting even more. · If you smoke fewer than 5 cigarettes a day, you may not need medicines to help you quit smoking. · These medicines have less nicotine than cigarettes. And by itself, nicotine is not nearly as harmful as smoking. The tars, carbon monoxide, and other toxic chemicals in tobacco cause the harmful effects. · The side effects of nicotine replacement products depend on the type of product. For example, a patch can make your skin red and itchy. Medicines in pill form can make you sick to your stomach. They can also cause dry mouth and trouble sleeping. For most people, the side effects are not bad enough to make them stop using the products. Why might you choose to use medicines to quit smoking? · You have tried on your own to stop smoking, but you were not able to stop. · You smoke more than 5 cigarettes a day. · You want to increase your chances of quitting smoking. · You want to reduce your cravings and withdrawal symptoms.   · You feel the benefits of medicine outweigh the side effects. Why might you choose not to use medicine? · You want to try quitting on your own by stopping all at once (\"cold turkey\"). · You want to cut back slowly on the number of cigarettes you smoke. · You smoke fewer than 5 cigarettes a day. · You do not like using medicine. · You feel the side effects of medicines outweigh the benefits. · You are worried about the cost of medicines. Your decision  Thinking about the facts and your feelings can help you make a decision that is right for you. Be sure you understand the benefits and risks of your options, and think about what else you need to do before you make the decision. Where can you learn more? Go to http://brian-cindy.info/. Enter R786 in the search box to learn more about \"Deciding About Using Medicines To Quit Smoking. \"  Current as of: September 26, 2018  Content Version: 12.1  © 5522-3087 [employer]. Care instructions adapted under license by [employer] (which disclaims liability or warranty for this information). If you have questions about a medical condition or this instruction, always ask your healthcare professional. [place] [address] any warranty or liability for your use of this information.

## 2019-09-12 NOTE — PROGRESS NOTES
Chief Complaint   Patient presents with    Hypertension       Pt in office for chronic medical condition follow up. Would like to see if she can get tramadol for pain. Also complains of insomnia. Has taken ambien in past.     Also needs chantix starter pack    1. Have you been to the ER, urgent care clinic since your last visit? Hospitalized since your last visit? No    2. Have you seen or consulted any other health care providers outside of the [address] [place] [address] since your last visit? Include any pap smears or colon screening.  No

## 2019-10-30 DIAGNOSIS — F51.01 PRIMARY INSOMNIA: ICD-10-CM

## 2019-10-30 RX ORDER — TRAZODONE HYDROCHLORIDE 100 MG/1
TABLET ORAL
Qty: 30 TAB | Refills: 1 | Status: SHIPPED | OUTPATIENT
Start: 2019-10-30 | End: 2019-12-31 | Stop reason: SDUPTHER

## 2019-11-12 ENCOUNTER — OFFICE VISIT (OUTPATIENT)
Dept: FAMILY MEDICINE CLINIC | Age: 59
End: 2019-11-12

## 2019-11-12 ENCOUNTER — HOSPITAL ENCOUNTER (OUTPATIENT)
Dept: LAB | Age: 59
Discharge: HOME | End: 2019-11-12
Payer: MEDICAID

## 2019-11-12 VITALS
BODY MASS INDEX: 30.64 KG/M2 | HEIGHT: 70 IN | SYSTOLIC BLOOD PRESSURE: 116 MMHG | DIASTOLIC BLOOD PRESSURE: 82 MMHG | HEART RATE: 72 BPM | OXYGEN SATURATION: 97 % | RESPIRATION RATE: 16 BRPM | TEMPERATURE: 97.7 F | WEIGHT: 214 LBS

## 2019-11-12 DIAGNOSIS — T14.8XXA BRUISE: ICD-10-CM

## 2019-11-12 DIAGNOSIS — M79.10 MYALGIA: ICD-10-CM

## 2019-11-12 DIAGNOSIS — Z99.89 OSA ON CPAP: Primary | ICD-10-CM

## 2019-11-12 DIAGNOSIS — G47.33 OSA ON CPAP: Primary | ICD-10-CM

## 2019-11-12 DIAGNOSIS — G25.81 RESTLESS LEG SYNDROME: ICD-10-CM

## 2019-11-12 LAB
ALBUMIN SERPL-MCNC: 4.1 G/DL (ref 3.4–5)
ALBUMIN/GLOB SERPL: 1.3 {RATIO} (ref 0.8–1.7)
ALP SERPL-CCNC: 93 U/L (ref 45–117)
ALT SERPL-CCNC: 27 U/L (ref 13–56)
ANION GAP SERPL CALC-SCNC: 1 MMOL/L (ref 3–18)
AST SERPL-CCNC: 21 U/L (ref 10–38)
BASOPHILS # BLD: 0 K/UL (ref 0–0.1)
BASOPHILS NFR BLD: 0 % (ref 0–2)
BILIRUB SERPL-MCNC: 0.4 MG/DL (ref 0.2–1)
BUN SERPL-MCNC: 14 MG/DL (ref 7–18)
BUN/CREAT SERPL: 13 (ref 12–20)
CALCIUM SERPL-MCNC: 9.6 MG/DL (ref 8.5–10.1)
CHLORIDE SERPL-SCNC: 108 MMOL/L (ref 100–111)
CO2 SERPL-SCNC: 30 MMOL/L (ref 21–32)
CREAT SERPL-MCNC: 1.04 MG/DL (ref 0.6–1.3)
DIFFERENTIAL METHOD BLD: NORMAL
EOSINOPHIL # BLD: 0.1 K/UL (ref 0–0.4)
EOSINOPHIL NFR BLD: 1 % (ref 0–5)
ERYTHROCYTE [DISTWIDTH] IN BLOOD BY AUTOMATED COUNT: 14.4 % (ref 11.6–14.5)
GLOBULIN SER CALC-MCNC: 3.2 G/DL (ref 2–4)
GLUCOSE SERPL-MCNC: 99 MG/DL (ref 74–99)
HCT VFR BLD AUTO: 44.2 % (ref 35–45)
HGB BLD-MCNC: 14.2 G/DL (ref 12–16)
LYMPHOCYTES # BLD: 1.5 K/UL (ref 0.9–3.6)
LYMPHOCYTES NFR BLD: 21 % (ref 21–52)
MCH RBC QN AUTO: 31.1 PG (ref 24–34)
MCHC RBC AUTO-ENTMCNC: 32.1 G/DL (ref 31–37)
MCV RBC AUTO: 96.7 FL (ref 74–97)
MONOCYTES # BLD: 0.5 K/UL (ref 0.05–1.2)
MONOCYTES NFR BLD: 7 % (ref 3–10)
NEUTS SEG # BLD: 5.2 K/UL (ref 1.8–8)
NEUTS SEG NFR BLD: 71 % (ref 40–73)
PLATELET # BLD AUTO: 246 K/UL (ref 135–420)
PMV BLD AUTO: 10.6 FL (ref 9.2–11.8)
POTASSIUM SERPL-SCNC: 4.2 MMOL/L (ref 3.5–5.5)
PROT SERPL-MCNC: 7.3 G/DL (ref 6.4–8.2)
RBC # BLD AUTO: 4.57 M/UL (ref 4.2–5.3)
SODIUM SERPL-SCNC: 139 MMOL/L (ref 136–145)
WBC # BLD AUTO: 7.3 K/UL (ref 4.6–13.2)

## 2019-11-12 PROCEDURE — 86617 LYME DISEASE ANTIBODY: CPT

## 2019-11-12 PROCEDURE — 85025 COMPLETE CBC W/AUTO DIFF WBC: CPT

## 2019-11-12 PROCEDURE — 80053 COMPREHEN METABOLIC PANEL: CPT

## 2019-11-12 RX ORDER — SERTRALINE HYDROCHLORIDE 50 MG/1
TABLET, FILM COATED ORAL DAILY
COMMUNITY
End: 2020-01-03

## 2019-11-12 NOTE — PATIENT INSTRUCTIONS
Restless Legs Syndrome: Care Instructions  Your Care Instructions  Restless legs syndrome is a common nervous system problem. People with this syndrome feel a creeping, achy, or unpleasant feeling in the legs and an overpowering urge to move them. It often occurs in the evening and at night and can lead to sleep problems and tiredness. Your doctor may suggest doing a study of your sleep patterns to figure out what is happening when you try to sleep. Many people get relief from symptoms when they get regular exercise, eat well, and avoid caffeine, alcohol, and tobacco.  Follow-up care is a key part of your treatment and safety. Be sure to make and go to all appointments, and call your doctor if you are having problems. It's also a good idea to know your test results and keep a list of the medicines you take. How can you care for yourself at home? · Take your medicines exactly as prescribed. Call your doctor if you think you are having a problem with your medicine. · Try bathing in hot or cold water. Applying a heating pad or ice bag to your legs may also help symptoms. · Stretch and massage your legs before bed or when discomfort begins. · Get some exercise for at least 30 minutes a day on most days of the week. Stop exercising at least 3 hours before bedtime. · Try to plan for situations where you will need to remain seated for long stretches. For example, if you are traveling by car, plan some stops so you can get out and walk around. · Tell your doctor about any medicines you are taking. This includes all over-the-counter, prescription, and herbal medicines. Some medicines, such as antidepressants, antihistamines, and cold and sinus medicines, can make your symptoms worse. · Avoid caffeine products, such as coffee, tea, cola, and chocolate. Caffeine can interrupt your sleep and stimulate you. · Do not smoke. Nicotine can make restless legs worse.  If you need help quitting, talk to your doctor about stop-smoking programs and medicines. These can increase your chances of quitting for good. · Do not drink alcohol late in the evening. Take steps to help you sleep better  · Get plenty of sunlight in the outdoors, particularly later in the afternoon. · Use the evening hours for settling down. Avoid activities that challenge you in the hours before bedtime. · Eat meals at regular times, and do not snack before bedtime. · Keep your bedroom quiet, dark, and cool. Try using a sleep mask and earplugs to help you sleep. · Limit how much you drink at night to reduce your need to get up to urinate. But do not go to bed thirsty. · Run a fan or other steady \"white noise\" during the night if noises wake you up. · Spencer the bed for sleeping and sex. Do your reading or TV watching in another room. · Once you are in bed, relax from head to toe, and guide your mind to pleasant thoughts. · Do not stay in bed longer than 8 hours, and try to avoid naps. When should you call for help? Watch closely for changes in your health, and be sure to contact your doctor if:    · You are still not getting enough sleep.     · Your symptoms become more severe or happen more often. Where can you learn more? Go to http://brian-cindy.info/. Enter A352 in the search box to learn more about \"Restless Legs Syndrome: Care Instructions. \"  Current as of: March 28, 2019  Content Version: 12.2  © 4044-2948 [employer], [employer]. Care instructions adapted under license by [employer] (which disclaims liability or warranty for this information). If you have questions about a medical condition or this instruction, always ask your healthcare professional. [place] [address] any warranty or liability for your use of this information.

## 2019-11-12 NOTE — PROGRESS NOTES
Manish Juarez presents today for   Chief Complaint   Patient presents with    Dizziness     2 month follow up     Restless Leg Syndrome     requesting something for    Sleep Apnea     would like to be tested for               Depression Screening:  3 most recent PHQ Screens 8/23/2019   PHQ Not Done Active Diagnosis of Depression or Bipolar Disorder   Little interest or pleasure in doing things -   Feeling down, depressed, irritable, or hopeless -   Total Score PHQ 2 -       Learning Assessment:  Learning Assessment 2/20/2014   PRIMARY LEARNER Patient   BARRIERS PRIMARY LEARNER NONE   PRIMARY LANGUAGE ENGLISH   LEARNER PREFERENCE PRIMARY -     -   RELATIONSHIP SELF       Abuse Screening:  No flowsheet data found. Fall Risk  Fall Risk Assessment, last 12 mths 4/10/2014   Able to walk? Yes   Fall in past 12 months? Yes   Fall with injury? No   Number of falls in past 12 months 1           Coordination of Care:  1. Have you been to the ER, urgent care clinic since your last visit? Hospitalized since your last visit? no    2. Have you seen or consulted any other health care providers outside of the [address] [place] [address] [place] since your last visit? Include any pap smears or colon screening.  no

## 2019-11-12 NOTE — PROGRESS NOTES
Shannan Paez is a 63 y.o. female  presents for dizziness, restless leg and possible sleep apnea. No chest pains or SOB. No Known Allergies  Outpatient Medications Marked as Taking for the 11/12/19 encounter (Office Visit) with Gia Blanchard MD   Medication Sig Dispense Refill    sertraline (ZOLOFT) 50 mg tablet Take  by mouth daily.  traZODone (DESYREL) 100 mg tablet TAKE 1 TABLET BY MOUTH EVERY DAY AT NIGHT 30 Tab 1    varenicline (CHANTIX STARTER NANI) 0.5 mg (11)- 1 mg (42) DsPk Take 0.5 mg by mouth daily (after breakfast). 1 Dose Pack 0    midodrine (PROAMITINE) 5 mg tablet Take 1 Tab by mouth three (3) times daily. 90 Tab 0    rivaroxaban (XARELTO) 20 mg tab tablet PLEASE SEE ATTACHED FOR DETAILED DIRECTIONS 30 Tab 1    levothyroxine (SYNTHROID) 137 mcg tablet TAKE ONE TABLET BY MOUTH DAILY (BEFORE BREAKFAST). 30 Tab 2    comprs. stocking,thigh,long,med (COMP. STOCKING,THIGH,LONG,MED.) misc 2 Packages by Does Not Apply route daily as needed (edema). Remove when lying down 2 Package 0    thiamine HCl (VITAMIN B-1 PO) Take  by mouth.  cyanocobalamin (VITAMIN B-12) 500 mcg tablet Take 500 mcg by mouth daily.  multivitamin (ONE A DAY) tablet Take 1 Tab by mouth two (2) times a day.  calcium citrate-vitamin D3 (UPCAL D) 500 mg-500 unit /5 gram powd Take 500 mg by mouth three (3) times daily.  Cholecalciferol, Vitamin D3, (VITAMIN D3) 1,000 unit chew Take 2,000 Units by mouth daily.        Patient Active Problem List   Diagnosis Code    Hypothyroidism E03.9    DJD (degenerative joint disease) M19.90    DVT (deep venous thrombosis) ([facility]) I82.409    Morbid obesity ([address] E66.01    HTN (hypertension) I10    FAINA (stress urinary incontinence, female) N39.3    GERD (gastroesophageal reflux disease) K21.9    CAMDEN on CPAP G47.33, Z99.89    History of pulmonary embolism Z86.711    History of DVT of lower extremity Z86.718    Bradycardia R00.1    History of gastric bypass Z98.84    Palpitations R00.2    History of hypertension Z86.79    POTS (postural orthostatic tachycardia syndrome) R00.0, I95.1    Tobacco abuse counseling Z71.6    Chest pain R07.9    Weakness R53.1     Past Medical History:   Diagnosis Date    Arthritis     Chronic obstructive pulmonary disease (HCC)     hx pe    Coagulation disorder (HCC)     clotting disorder    Hx of colonic polyps     HX OTHER MEDICAL     pulmonary embolism x 2    HX OTHER MEDICAL     PCOS    HX OTHER MEDICAL     Heart monitor 2015     Hypertension     no longer on meds since gastric bypass    Long term (current) use of anticoagulants     Osteopenia     PE (pulmonary thromboembolism) ([address]     Plantar fasciitis of left foot     Tendinopathy Oct 2013    Right insertional Achilles tendinopathy    Thromboembolus ([address]     blood clot in left leg then lung in , then clot behind right knee     Thromboembolus ([address]     also PE    Thyroid disease      Social History     Socioeconomic History    Marital status:      Spouse name: Not on file    Number of children: Not on file    Years of education: Not on file    Highest education level: Not on file   Tobacco Use    Smoking status: Current Every Day Smoker     Packs/day: 1.50     Years: 10.00     Pack years: 15.00     Types: Cigarettes     Last attempt to quit: 2013     Years since quittin.2    Smokeless tobacco: Never Used    Tobacco comment: vapor pen   Substance and Sexual Activity    Alcohol use: No     Alcohol/week: 3.3 standard drinks     Types: 2 Glasses of wine, 2 Standard drinks or equivalent per week     Comment: once weekly; quit     Drug use: No     Family History   Problem Relation Age of Onset    Diabetes Other     Hypertension Other     Heart Disease Other     Arthritis-osteo Other     Stroke Mother 66    Stroke Father 36    Heart Attack Neg Hx         Review of Systems   Constitutional: Negative for chills, fever, malaise/fatigue and weight loss. Eyes: Negative for blurred vision. Respiratory: Negative for shortness of breath and wheezing. Cardiovascular: Negative for chest pain. Gastrointestinal: Negative for nausea and vomiting. Musculoskeletal: Negative for myalgias. Skin: Negative for rash. Neurological: Negative for weakness. Endo/Heme/Allergies: Bruises/bleeds easily. Vitals:    11/12/19 1104   BP: 116/82   Pulse: 72   Resp: 16   Temp: 97.7 °F (36.5 °C)   TempSrc: Oral   SpO2: 97%   Weight: 214 lb (97.1 kg)   Height: 5' 10\" (1.778 m)   PainSc:   2   LMP: 12/31/2013       Physical Exam   Constitutional: She is oriented to person, place, and time and well-developed, well-nourished, and in no distress. Neck: Normal range of motion. Neck supple. No thyromegaly present. Cardiovascular: Normal rate, regular rhythm and normal heart sounds. Pulmonary/Chest: Effort normal and breath sounds normal.   Musculoskeletal: Normal range of motion. Neurological: She is alert and oriented to person, place, and time. Skin: Skin is warm and dry. No rash noted. No erythema. No pallor. Psychiatric: Mood, memory, affect and judgment normal.   Nursing note and vitals reviewed. Assessment/Plan      ICD-10-CM ICD-9-CM    1. CAMDEN on CPAP G47.33 327.23 SLEEP MEDICINE REFERRAL    Z99.89 V46.8    2. Restless leg syndrome G25.81 333.94    3. Myalgia M79.10 729.1 LYME AB, IGG & IGM BY WB      METABOLIC PANEL, COMPREHENSIVE   4. Bruise T14. 8XXA 924.9 CBC WITH AUTOMATED DIFF     I have discussed the diagnosis with the patient and the intended plan of care as seen in the above orders. The patient has received an after-visit summary and questions were answered concerning future plans. I have discussed medication, side effects, and warnings with the patient in detail. The patient verbalized understanding and is in agreement with the plan of care.  The patient will contact the office with any additional concerns.         lab results and schedule of future lab studies reviewed with patient    Shannan Oscar MD

## 2019-11-13 NOTE — PROGRESS NOTES
Left detailed message explaining that labs were normal- advised patient if she has any additional questions then to give our office a call back at [phone].

## 2019-11-14 LAB
B BURGDOR IGG PATRN SER IB-IMP: NEGATIVE
B BURGDOR IGM PATRN SER IB-IMP: NEGATIVE
B BURGDOR18KD IGG SER QL IB: PRESENT
B BURGDOR23KD IGG SER QL IB: ABNORMAL
B BURGDOR23KD IGM SER QL IB: ABNORMAL
B BURGDOR28KD IGG SER QL IB: ABNORMAL
B BURGDOR30KD IGG SER QL IB: ABNORMAL
B BURGDOR39KD IGG SER QL IB: ABNORMAL
B BURGDOR39KD IGM SER QL IB: ABNORMAL
B BURGDOR41KD IGG SER QL IB: ABNORMAL
B BURGDOR41KD IGM SER QL IB: ABNORMAL
B BURGDOR45KD IGG SER QL IB: ABNORMAL
B BURGDOR58KD IGG SER QL IB: ABNORMAL
B BURGDOR66KD IGG SER QL IB: ABNORMAL
B BURGDOR93KD IGG SER QL IB: ABNORMAL

## 2019-11-15 ENCOUNTER — OFFICE VISIT (OUTPATIENT)
Dept: CARDIOLOGY CLINIC | Age: 59
End: 2019-11-15

## 2019-11-15 VITALS
BODY MASS INDEX: 30.35 KG/M2 | SYSTOLIC BLOOD PRESSURE: 116 MMHG | HEIGHT: 70 IN | DIASTOLIC BLOOD PRESSURE: 79 MMHG | WEIGHT: 212 LBS | HEART RATE: 68 BPM

## 2019-11-15 DIAGNOSIS — G90.A POTS (POSTURAL ORTHOSTATIC TACHYCARDIA SYNDROME): Primary | ICD-10-CM

## 2019-11-15 DIAGNOSIS — Z98.84 HISTORY OF GASTRIC BYPASS: ICD-10-CM

## 2019-11-15 DIAGNOSIS — Z86.711 HISTORY OF PULMONARY EMBOLISM: ICD-10-CM

## 2019-11-15 DIAGNOSIS — Z86.718 HISTORY OF DVT OF LOWER EXTREMITY: ICD-10-CM

## 2019-11-15 DIAGNOSIS — Z71.6 TOBACCO ABUSE COUNSELING: ICD-10-CM

## 2019-11-15 RX ORDER — MIDODRINE HYDROCHLORIDE 5 MG/1
5 TABLET ORAL 3 TIMES DAILY
Qty: 90 TAB | Refills: 5 | Status: SHIPPED | OUTPATIENT
Start: 2019-11-15 | End: 2020-10-26 | Stop reason: SDUPTHER

## 2019-11-15 RX ORDER — LANOLIN ALCOHOL/MO/W.PET/CERES
1 CREAM (GRAM) TOPICAL
COMMUNITY

## 2019-11-15 RX ORDER — ACETAMINOPHEN, DIPHENHYDRAMINE HCL, PHENYLEPHRINE HCL 325; 25; 5 MG/1; MG/1; MG/1
TABLET ORAL
COMMUNITY

## 2019-11-15 NOTE — PROGRESS NOTES
1. Have you been to the ER, urgent care clinic since your last visit? Hospitalized since your last visit?     no  2. Have you seen or consulted any other health care providers outside of the [facility] since your last visit? Include any pap smears or colon screening. Yes Where: pcp     3. Since your last visit, have you had any of the following symptoms? chest pains, palpitations, shortness of breath, dizziness and swelling in legs/arms. 4.  Have you had any blood work, X-rays or cardiac testing? Yes Where: Dr Jannette Torres         5. Where do you normally have your labs drawn?   pcp    6. Do you need any refills today?    yes

## 2019-11-15 NOTE — PROGRESS NOTES
HISTORY OF PRESENT ILLNESS  Franci Olguin is a 63 y.o. female. 4/19 history of syncope about 2014 approximately. It happened after gastric bypass surgery when she had lost a lot of weight. 4/19 history of CP lower sternal, radiates to the back. No relation to activity or food. Lasting many hours. No radiation no associated nausea vomiting diaphoresis. Feels harder to breathe during the episode. No medicines. Spontaneous relief in few hours. 7/2019 C/O substernal chest pain with exertion. She continues to have dizziness and palpitations in the evenings. She denies having syncopal episode. Dizziness   The history is provided by the patient. This is a new problem. The current episode started more than 1 week ago (yrs; see comments also). The problem occurs daily. The problem has not changed (almost daily AMs) since onset. Associated symptoms include chest pain. Pertinent negatives include no headaches and no shortness of breath. Exacerbated by: driving; quit driving 5/95. The symptoms are relieved by rest (pull over if driving). Palpitations    The history is provided by the patient. This is a new problem. The current episode started more than 1 week ago (few weaks, feels a hard beat but the rate is still 50-60.). Associated symptoms include chest pain and dizziness. Pertinent negatives include no fever, no malaise/fatigue, no claudication, no orthopnea, no PND, no nausea, no vomiting, no headaches, no cough and no shortness of breath. Slow Heart Rate   The history is provided by the patient and medical records. This is a new (46s) problem. The current episode started more than 1 week ago. The problem has not changed since onset. Associated symptoms include chest pain. Pertinent negatives include no headaches and no shortness of breath. Review of Systems   Constitutional: Negative for chills, fever, malaise/fatigue and weight loss. HENT: Negative for nosebleeds.     Eyes: Negative for discharge. Respiratory: Negative for cough, shortness of breath and wheezing. Cardiovascular: Positive for chest pain and palpitations. Negative for orthopnea, claudication, leg swelling and PND. Gastrointestinal: Negative for diarrhea, nausea and vomiting. Genitourinary: Negative for dysuria and hematuria. Musculoskeletal: Negative for joint pain. Skin: Negative for rash. Neurological: Positive for dizziness. Negative for seizures, loss of consciousness and headaches. Endo/Heme/Allergies: Negative for polydipsia. Does not bruise/bleed easily. Psychiatric/Behavioral: Negative for depression and substance abuse. The patient does not have insomnia.       No Known Allergies    Past Medical History:   Diagnosis Date    Arthritis     Chronic obstructive pulmonary disease (HCC)     hx pe    Coagulation disorder (HCC)     clotting disorder    Hx of colonic polyps     HX OTHER MEDICAL     pulmonary embolism x 2    HX OTHER MEDICAL     PCOS    HX OTHER MEDICAL     Heart monitor 2015     Hypertension     no longer on meds since gastric bypass    Long term (current) use of anticoagulants     Osteopenia     PE (pulmonary thromboembolism) ([address]     Plantar fasciitis of left foot     Tendinopathy Oct 2013    Right insertional Achilles tendinopathy    Thromboembolus ([address]     blood clot in left leg then lung in , then clot behind right knee 2009    Thromboembolus ([address]     also PE    Thyroid disease        Family History   Problem Relation Age of Onset    Diabetes Other     Hypertension Other     Heart Disease Other     Arthritis-osteo Other     Stroke Mother 66    Stroke Father 36    Heart Attack Neg Hx        Social History     Tobacco Use    Smoking status: Current Every Day Smoker     Packs/day: 1.50     Years: 10.00     Pack years: 15.00     Types: Cigarettes     Last attempt to quit: 2013     Years since quittin.2    Smokeless tobacco: Never Used    Tobacco comment: vapor pen   Substance Use Topics    Alcohol use: No     Alcohol/week: 3.3 standard drinks     Types: 2 Glasses of wine, 2 Standard drinks or equivalent per week     Comment: once weekly; quit 2018    Drug use: No        Current Outpatient Medications   Medication Sig    calcium citrate-vitamin d3 (CITRACAL+D) 315-200 mg-unit tab Take 1 Tab by mouth daily (with breakfast).  melatonin 3 mg tablet Take  by mouth. 6mg-9 mg as needed    sertraline (ZOLOFT) 50 mg tablet Take  by mouth daily.  traZODone (DESYREL) 100 mg tablet TAKE 1 TABLET BY MOUTH EVERY DAY AT NIGHT    midodrine (PROAMITINE) 5 mg tablet Take 1 Tab by mouth three (3) times daily.  rivaroxaban (XARELTO) 20 mg tab tablet PLEASE SEE ATTACHED FOR DETAILED DIRECTIONS    levothyroxine (SYNTHROID) 137 mcg tablet TAKE ONE TABLET BY MOUTH DAILY (BEFORE BREAKFAST).  comprs. stocking,thigh,long,med (COMP. STOCKING,THIGH,LONG,MED.) misc 2 Packages by Does Not Apply route daily as needed (edema). Remove when lying down    thiamine HCl (VITAMIN B-1 PO) Take  by mouth.  cyanocobalamin (VITAMIN B-12) 500 mcg tablet Take 500 mcg by mouth daily.  multivitamin (ONE A DAY) tablet Take 1 Tab by mouth two (2) times a day.  Cholecalciferol, Vitamin D3, (VITAMIN D3) 1,000 unit chew Take 2,000 Units by mouth daily. No current facility-administered medications for this visit.          Past Surgical History:   Procedure Laterality Date    HX DILATION AND CURETTAGE      ablation    HX DILATION AND CURETTAGE      age 17    HX GASTRIC BYPASS  1/15/2014    HX TUBAL LIGATION  1996       Visit Vitals  /79   Pulse 68   Ht 5' 10\" (1.778 m)   Wt 96.2 kg (212 lb)   LMP 12/31/2013   BMI 30.42 kg/m²     Vitals:    11/15/19 1052   BP: 116/79   Pulse: 68   Weight: 96.2 kg (212 lb)   Height: 5' 10\" (1.778 m)         Diagnostic Studies:  I have reviewed the relevant tests done on the patient and show as follows  EKG tracings reviewed by me today.    EKG Results     None        XR Results (most recent):  Results from Hospital Encounter encounter on 12/09/13   XR CHEST PA LAT    Narrative History: Preop    PA AND LATERAL CHEST 2 VIEWS    CPT: 43697    The heart and lungs and bony structures appear unremarkable for a patient of  this age. Impression IMPRESSION:    Normal chest.       No flowsheet data found. 4/19 echo  Interpretation Summary        · Normal cavity size, systolic function (ejection fraction normal) and diastolic function. Mild concentric hypertrophy. The estimated ejection fraction is 56 - 60%. No regional wall motion abnormality noted. · Normal right ventricular size and function. · Mild tricuspid valve regurgitation is present. Pulmonary arterial systolic pressure is 15.5 mmHg. There is no evidence of pulmonary hypertension. · Mildly elevated central venous pressure (5-10 mmHg); IVC diameter is less than 21 mm and collapses less than 50% with respiration. Comparison Study Information   Prior Study   There is a prior study available for comparison that was performed on 11/25/2014. As compared to the previous study, there are no significant changes. 7/19 TMT  Interpretation Summary     · Baseline ECG: Normal sinus rhythm. · Negative stress test.  · Low risk Duke treadmill score. · Appropriate heart rate and blood pressure response to exercise. · Appropriate heart rate recovery   3/15 cardiac cath   1. No significant coronary artery disease  2. Normal left ventricular systolic function with LV EF 60%  3. Normal left ventricular end-diastolic pressure. RECOMMENDATIONS:   1. Risk factor modification including exercise. 2. Evaluation for noncardiac etiologies of chest discomfort. CT CTA CHEST PULMONARY3/25/2019  [facility] [email or web address]  Result Impression     1. No CT evidence of pulmonary thromboembolism. 2. Calcification in the LAD coronary artery.      5/2019 - Event monitor   No evidence of severe roberto or tachy arrhythmias. Patient markers associated with sinus rhythm    Ms. Rashida Paredes has a reminder for a \"due or due soon\" health maintenance. I have asked that she contact her primary care provider for follow-up on this health maintenance. Physical Exam   Constitutional: She is oriented to person, place, and time. She appears well-developed and well-nourished. No distress. Tall stature   HENT:   Head: Normocephalic and atraumatic. Mouth/Throat: Normal dentition. Eyes: Right eye exhibits no discharge. Left eye exhibits no discharge. No scleral icterus. Neck: Neck supple. No JVD present. Carotid bruit is not present. No thyromegaly present. Cardiovascular: Normal rate, regular rhythm, S1 normal, S2 normal and intact distal pulses. Exam reveals no gallop and no friction rub. Murmur heard. High-pitched blowing midsystolic murmur is present at the apex. Pulmonary/Chest: Effort normal and breath sounds normal. She has no wheezes. She has no rales. Abdominal: Soft. She exhibits no mass. There is no tenderness. Musculoskeletal: She exhibits no edema. Lymphadenopathy:        Right cervical: No superficial cervical adenopathy present. Left cervical: No superficial cervical adenopathy present. Neurological: She is alert and oriented to person, place, and time. Skin: Skin is warm and dry. No rash noted. Psychiatric: She has a normal mood and affect. Her behavior is normal.       ASSESSMENT and PLAN    Patient advised to stop smoking to reduce future cardiovascular events. Estimated Creatinine Clearance: 74.1 mL/min (by C-G formula based on SCr of 1.04 mg/dL). Patient seems to have POTS already by orthostatic changes. As discussed with her and daughter in the office, we would not need to do tilt table for diagnosis yet. She was advised and strongly recommended to use the muscle training to increase the muscle tone and strength including some resistance exercises.   Joining yoga may be good for overall muscle tone as well as balance. Plenty of water intake and continue the present medications. Patient is feeling foggy in the mind at times. Would prefer less of sedating medications. For insomnia, would prefer a shorter acting drug so her daytime somnolence or fogginess improves. Diagnoses and all orders for this visit:    1. POTS (postural orthostatic tachycardia syndrome)  -     midodrine (PROAMITINE) 5 mg tablet; Take 1 Tab by mouth three (3) times daily. 2. History of pulmonary embolism    3. Tobacco abuse counseling    4. History of gastric bypass    5. History of DVT of lower extremity        Pertinent laboratory and test data reviewed and discussed with patient. See patient instructions also for other medical advice given    Medications Discontinued During This Encounter   Medication Reason    varenicline (CHANTIX STARTER NANI) 0.5 mg (11)- 1 mg (42) DsPk     calcium citrate-vitamin D3 (UPCAL D) 500 mg-500 unit /5 gram powd     midodrine (PROAMITINE) 5 mg tablet Reorder       Follow-up and Dispositions    · Return in about 6 months (around 5/15/2020), or if symptoms worsen or fail to improve.

## 2019-11-15 NOTE — PATIENT INSTRUCTIONS
Medications Discontinued During This Encounter   Medication Reason    varenicline (CHANTIX STARTER NANI) 0.5 mg (11)- 1 mg (42) DsPk     calcium citrate-vitamin D3 (UPCAL D) 500 mg-500 unit /5 gram powd     midodrine (PROAMITINE) 5 mg tablet Reorder          Learning About Benefits From Quitting Smoking  How does quitting smoking make you healthier? If you're thinking about quitting smoking, you may have a few reasons to be smoke-free. Your health may be one of them. · When you quit smoking, you lower your risks for cancer, lung disease, heart attack, stroke, blood vessel disease, and blindness from macular degeneration. · When you're smoke-free, you get sick less often, and you heal faster. You are less likely to get colds, flu, bronchitis, and pneumonia. · As a nonsmoker, you may find that your mood is better and you are less stressed. When and how will you feel healthier? Quitting has real health benefits that start from day 1 of being smoke-free. And the longer you stay smoke-free, the healthier you get and the better you feel. The first hours  · After just 20 minutes, your blood pressure and heart rate go down. That means there's less stress on your heart and blood vessels. · Within 12 hours, the level of carbon monoxide in your blood drops back to normal. That makes room for more oxygen. With more oxygen in your body, you may notice that you have more energy than when you smoked. After 2 weeks  · Your lungs start to work better. · Your risk of heart attack starts to drop. After 1 month  · When your lungs are clear, you cough less and breathe deeper, so it's easier to be active. · Your sense of taste and smell return. That means you can enjoy food more than you have since you started smoking. Over the years  · After 1 year, your risk of heart disease is half what it would be if you kept smoking. · After 5 years, your risk of stroke starts to shrink.  Within a few years after that, it's about the same as if you'd never smoked. · After 10 years, your risk of dying from lung cancer is cut by about half. And your risk for many other types of cancer is lower too. How would quitting help others in your life? When you quit smoking, you improve the health of everyone who now breathes in your smoke. · Their heart, lung, and cancer risks drop, much like yours. · They are sick less. For babies and small children, living smoke-free means they're less likely to have ear infections, pneumonia, and bronchitis. · If you're a woman who is or will be pregnant someday, quitting smoking means a healthier . · Children who are close to you are less likely to become adult smokers. Where can you learn more? Go to http://brian-cindy.info/. Enter 052 [id number] 72 11 in the search box to learn more about \"Learning About Benefits From Quitting Smoking. \"  Current as of: 2018  Content Version: 12.2  © 8471-5944 [employer]. Care instructions adapted under license by [employer] (which disclaims liability or warranty for this information). If you have questions about a medical condition or this instruction, always ask your healthcare professional. [place] [address] any warranty or liability for your use of this information.

## 2019-11-21 NOTE — PROGRESS NOTES
Attempted to call patient- no answer. Mailbox is full- unable to leave message.  Will try again later

## 2019-12-31 DIAGNOSIS — F51.01 PRIMARY INSOMNIA: ICD-10-CM

## 2019-12-31 NOTE — TELEPHONE ENCOUNTER
This pharmacy faxed over request for the following prescriptions to be filled:    Medication requested :   Requested Prescriptions     Pending Prescriptions Disp Refills    traZODone (DESYREL) 100 mg tablet 30 Tab 1     Sig: TAKE 1 TABLET BY MOUTH EVERY DAY AT NIGHT       PCP: Jackie [address] or Print: Pharmacy  Mail order or Local pharmacy: [address] [facility], [address] [place] [address]. Scheduled appointment if not seen by current providers in office: Seen 11/2019, not due until Feb 2020.

## 2020-01-03 RX ORDER — SERTRALINE HYDROCHLORIDE 50 MG/1
TABLET, FILM COATED ORAL
Qty: 30 TAB | Refills: 3 | Status: SHIPPED | OUTPATIENT
Start: 2020-01-03 | End: 2020-05-05

## 2020-01-03 RX ORDER — TRAZODONE HYDROCHLORIDE 100 MG/1
TABLET ORAL
Qty: 30 TAB | Refills: 1 | Status: SHIPPED | OUTPATIENT
Start: 2020-01-03 | End: 2020-03-05

## 2020-01-03 RX ORDER — LEVOTHYROXINE SODIUM 137 UG/1
TABLET ORAL
Qty: 30 TAB | Refills: 2 | Status: SHIPPED | OUTPATIENT
Start: 2020-01-03 | End: 2020-02-11 | Stop reason: SDUPTHER

## 2020-01-16 ENCOUNTER — OFFICE VISIT (OUTPATIENT)
Dept: FAMILY MEDICINE CLINIC | Age: 60
End: 2020-01-16

## 2020-01-16 VITALS
BODY MASS INDEX: 28.63 KG/M2 | RESPIRATION RATE: 13 BRPM | DIASTOLIC BLOOD PRESSURE: 64 MMHG | SYSTOLIC BLOOD PRESSURE: 106 MMHG | OXYGEN SATURATION: 98 % | HEIGHT: 70 IN | HEART RATE: 58 BPM | WEIGHT: 200 LBS

## 2020-01-16 DIAGNOSIS — G62.9 NEUROPATHY: Primary | ICD-10-CM

## 2020-01-16 DIAGNOSIS — E66.01 MORBID OBESITY (HCC): ICD-10-CM

## 2020-01-16 RX ORDER — PHENTERMINE HYDROCHLORIDE 37.5 MG/1
37.5 TABLET ORAL
Qty: 30 TAB | Refills: 1 | Status: SHIPPED | OUTPATIENT
Start: 2020-01-16 | End: 2020-02-11 | Stop reason: SDUPTHER

## 2020-01-16 NOTE — PATIENT INSTRUCTIONS
Body Mass Index: Care Instructions  Your Care Instructions    Body mass index (BMI) can help you see if your weight is raising your risk for health problems. It uses a formula to compare how much you weigh with how tall you are. · A BMI lower than 18.5 is considered underweight. · A BMI between 18.5 and 24.9 is considered healthy. · A BMI between 25 and 29.9 is considered overweight. A BMI of 30 or higher is considered obese. If your BMI is in the normal range, it means that you have a lower risk for weight-related health problems. If your BMI is in the overweight or obese range, you may be at increased risk for weight-related health problems, such as high blood pressure, heart disease, stroke, arthritis or joint pain, and diabetes. If your BMI is in the underweight range, you may be at increased risk for health problems such as fatigue, lower protection (immunity) against illness, muscle loss, bone loss, hair loss, and hormone problems. BMI is just one measure of your risk for weight-related health problems. You may be at higher risk for health problems if you are not active, you eat an unhealthy diet, or you drink too much alcohol or use tobacco products. Follow-up care is a key part of your treatment and safety. Be sure to make and go to all appointments, and call your doctor if you are having problems. It's also a good idea to know your test results and keep a list of the medicines you take. How can you care for yourself at home? · Practice healthy eating habits. This includes eating plenty of fruits, vegetables, whole grains, lean protein, and low-fat dairy. · If your doctor recommends it, get more exercise. Walking is a good choice. Bit by bit, increase the amount you walk every day. Try for at least 30 minutes on most days of the week. · Do not smoke. Smoking can increase your risk for health problems. If you need help quitting, talk to your doctor about stop-smoking programs and medicines. These can increase your chances of quitting for good. · Limit alcohol to 2 drinks a day for men and 1 drink a day for women. Too much alcohol can cause health problems. If you have a BMI higher than 25  · Your doctor may do other tests to check your risk for weight-related health problems. This may include measuring the distance around your waist. A waist measurement of more than 40 inches in men or 35 inches in women can increase the risk of weight-related health problems. · Talk with your doctor about steps you can take to stay healthy or improve your health. You may need to make lifestyle changes to lose weight and stay healthy, such as changing your diet and getting regular exercise. If you have a BMI lower than 18.5  · Your doctor may do other tests to check your risk for health problems. · Talk with your doctor about steps you can take to stay healthy or improve your health. You may need to make lifestyle changes to gain or maintain weight and stay healthy, such as getting more healthy foods in your diet and doing exercises to build muscle. Where can you learn more? Go to http://brian-cindy.info/. Enter S176 in the search box to learn more about \"Body Mass Index: Care Instructions. \"  Current as of: March 28, 2019  Content Version: 12.2  © 3032-8469 [employer]. Care instructions adapted under license by [employer] (which disclaims liability or warranty for this information). If you have questions about a medical condition or this instruction, always ask your healthcare professional. [address] any warranty or liability for your use of this information.

## 2020-01-16 NOTE — PROGRESS NOTES
Andrade Mckenzie is a 63 y.o. female  presents for follow up on numbness in both thumbs. No recent injury or trauma. She also wants to be restarted on diet meds. No Known Allergies  Outpatient Medications Marked as Taking for the 1/16/20 encounter (Office Visit) with Karen Butterfield MD   Medication Sig Dispense Refill    phentermine (ADIPEX-P) 37.5 mg tablet Take 1 Tab by mouth every morning.  Max Daily Amount: 37.5 mg. 30 Tab 1     Patient Active Problem List   Diagnosis Code    Hypothyroidism E03.9    DJD (degenerative joint disease) M19.90    DVT (deep venous thrombosis) ([facility]) I82.409    Morbid obesity ([facility] E66.01    HTN (hypertension) I10    FAINA (stress urinary incontinence, female) N39.3    GERD (gastroesophageal reflux disease) K21.9    CAMDEN on CPAP G47.33, Z99.89    History of pulmonary embolism Z86.711    History of DVT of lower extremity Z86.718    Bradycardia R00.1    History of gastric bypass Z98.84    Palpitations R00.2    History of hypertension Z86.79    POTS (postural orthostatic tachycardia syndrome) R00.0, I95.1    Tobacco abuse counseling Z71.6    Chest pain R07.9    Weakness R53.1     Past Medical History:   Diagnosis Date    Arthritis     Chronic obstructive pulmonary disease (HCC)     hx pe    Coagulation disorder (HCC)     clotting disorder    Hx of colonic polyps     HX OTHER MEDICAL     pulmonary embolism x 2    HX OTHER MEDICAL     PCOS    HX OTHER MEDICAL     Heart monitor 1/2015     Hypertension     no longer on meds since gastric bypass    Long term (current) use of anticoagulants     Osteopenia     PE (pulmonary thromboembolism) ([facility] 2008    Plantar fasciitis of left foot     Tendinopathy Oct 2013    Right insertional Achilles tendinopathy    Thromboembolus ([facility]     blood clot in left leg then lung in 2007, then clot behind right knee 2009    Thromboembolus [facility])     also PE    Thyroid disease      Social History     Socioeconomic History    Marital status:      Spouse name: Not on file    Number of children: Not on file    Years of education: Not on file    Highest education level: Not on file   Tobacco Use    Smoking status: Current Every Day Smoker     Packs/day: 1.50     Years: 10.00     Pack years: 15.00     Types: Cigarettes     Last attempt to quit: 2013     Years since quittin.4    Smokeless tobacco: Never Used    Tobacco comment: vapor pen   Substance and Sexual Activity    Alcohol use: No     Alcohol/week: 3.3 standard drinks     Types: 2 Glasses of wine, 2 Standard drinks or equivalent per week     Comment: once weekly; quit     Drug use: No     Family History   Problem Relation Age of Onset    Diabetes Other     Hypertension Other     Heart Disease Other     Arthritis-osteo Other     Stroke Mother 66    Stroke Father 36    Heart Attack Neg Hx         Review of Systems   Constitutional: Negative for chills, fever, malaise/fatigue and weight loss. Eyes: Negative for blurred vision. Respiratory: Negative for shortness of breath and wheezing. Cardiovascular: Negative for chest pain. Gastrointestinal: Negative for nausea and vomiting. Musculoskeletal: Negative for myalgias. Skin: Negative for rash. Neurological: Negative for weakness. Vitals:    20 1023   BP: 106/64   Pulse: (!) 58   Resp: 13   SpO2: 98%   Weight: 200 lb (90.7 kg)   Height: 5' 10\" (1.778 m)   PainSc:   0 - No pain   LMP: 2013       Physical Exam  Vitals signs and nursing note reviewed. Neck:      Musculoskeletal: Normal range of motion and neck supple. Thyroid: No thyromegaly. Cardiovascular:      Rate and Rhythm: Normal rate and regular rhythm. Heart sounds: Normal heart sounds. Pulmonary:      Effort: Pulmonary effort is normal.      Breath sounds: Normal breath sounds. Musculoskeletal: Normal range of motion. Skin:     General: Skin is warm and dry.    Neurological:      Mental Status: She is alert and oriented to person, place, and time. Psychiatric:         Mood and Affect: Mood normal.         Behavior: Behavior normal.         Thought Content: Thought content normal.         Judgment: Judgment normal.         Assessment/Plan      ICD-10-CM ICD-9-CM    1. Neuropathy G62.9 355.9 Continue current meds. 2. Morbid obesity ([address] E66.01 278.01 phentermine (ADIPEX-P) 37.5 mg tablet     Follow-up and Dispositions    · Return in about 2 months (around 3/16/2020). I have discussed the diagnosis with the patient and the intended plan of care as seen in the above orders. The patient has received an after-visit summary and questions were answered concerning future plans. I have discussed medication, side effects, and warnings with the patient in detail. The patient verbalized understanding and is in agreement with the plan of care. The patient will contact the office with any additional concerns.       lab results and schedule of future lab studies reviewed with patient    Mary Abarca MD

## 2020-01-16 NOTE — PROGRESS NOTES
Chief Complaint   Patient presents with    Numbness     in both hands     Weight Management     Pt c/o numbness in both hands. Would also like phentermine.

## 2020-02-11 ENCOUNTER — OFFICE VISIT (OUTPATIENT)
Dept: FAMILY MEDICINE CLINIC | Age: 60
End: 2020-02-11

## 2020-02-11 VITALS
HEART RATE: 72 BPM | BODY MASS INDEX: 28.63 KG/M2 | RESPIRATION RATE: 14 BRPM | HEIGHT: 70 IN | WEIGHT: 200 LBS | OXYGEN SATURATION: 96 % | DIASTOLIC BLOOD PRESSURE: 62 MMHG | SYSTOLIC BLOOD PRESSURE: 118 MMHG

## 2020-02-11 DIAGNOSIS — E03.9 HYPOTHYROIDISM, UNSPECIFIED TYPE: Primary | ICD-10-CM

## 2020-02-11 DIAGNOSIS — G89.29 OTHER CHRONIC PAIN: ICD-10-CM

## 2020-02-11 DIAGNOSIS — L21.9 SEBORRHEA: ICD-10-CM

## 2020-02-11 DIAGNOSIS — G25.81 RESTLESS LEG: ICD-10-CM

## 2020-02-11 DIAGNOSIS — E66.01 MORBID OBESITY (HCC): ICD-10-CM

## 2020-02-11 DIAGNOSIS — I82.401 ACUTE DEEP VEIN THROMBOSIS (DVT) OF RIGHT LOWER EXTREMITY, UNSPECIFIED VEIN (HCC): ICD-10-CM

## 2020-02-11 RX ORDER — LEVOTHYROXINE SODIUM 137 UG/1
137 TABLET ORAL
Qty: 30 TAB | Refills: 2 | Status: SHIPPED | OUTPATIENT
Start: 2020-02-11 | End: 2020-05-05

## 2020-02-11 RX ORDER — ROPINIROLE 1 MG/1
1 TABLET, FILM COATED ORAL DAILY
Qty: 30 TAB | Refills: 2 | Status: SHIPPED | OUTPATIENT
Start: 2020-02-11 | End: 2020-04-09 | Stop reason: ALTCHOICE

## 2020-02-11 RX ORDER — PHENTERMINE HYDROCHLORIDE 37.5 MG/1
37.5 TABLET ORAL
Qty: 30 TAB | Refills: 1 | Status: SHIPPED | OUTPATIENT
Start: 2020-02-11 | End: 2021-04-19 | Stop reason: ALTCHOICE

## 2020-02-11 RX ORDER — TRAMADOL HYDROCHLORIDE 50 MG/1
50 TABLET ORAL
Qty: 30 TAB | Refills: 0 | Status: SHIPPED | OUTPATIENT
Start: 2020-02-11 | End: 2020-02-18

## 2020-02-11 RX ORDER — KETOCONAZOLE 20 MG/ML
SHAMPOO TOPICAL
Qty: 1 BOTTLE | Refills: 1 | Status: SHIPPED | OUTPATIENT
Start: 2020-02-11 | End: 2021-04-19 | Stop reason: ALTCHOICE

## 2020-02-11 NOTE — PATIENT INSTRUCTIONS

## 2020-02-11 NOTE — PROGRESS NOTES
Marilia Champagne is a 63 y.o. female  presents for follow up on restless leg thyroid HTN DVT chronic pain. She has lesions in her scalp that are itchy. No drainage or assoc fever or chills.      No Known Allergies    Patient Active Problem List   Diagnosis Code    Hypothyroidism E03.9    DJD (degenerative joint disease) M19.90    DVT (deep venous thrombosis) ([facility]) I82.409    Morbid obesity ([address] E66.01    HTN (hypertension) I10    FAINA (stress urinary incontinence, female) N39.3    GERD (gastroesophageal reflux disease) K21.9    CAMDEN on CPAP G47.33, Z99.89    History of pulmonary embolism Z86.711    History of DVT of lower extremity Z86.718    Bradycardia R00.1    History of gastric bypass Z98.84    Palpitations R00.2    History of hypertension Z86.79    POTS (postural orthostatic tachycardia syndrome) R00.0, I95.1    Tobacco abuse counseling Z71.6    Chest pain R07.9    Weakness R53.1     Past Medical History:   Diagnosis Date    Arthritis     Chronic obstructive pulmonary disease (HCC)     hx pe    Coagulation disorder (HCC)     clotting disorder    Hx of colonic polyps     HX OTHER MEDICAL     pulmonary embolism x 2    HX OTHER MEDICAL     PCOS    HX OTHER MEDICAL     Heart monitor 1/2015     Hypertension     no longer on meds since gastric bypass    Long term (current) use of anticoagulants     Osteopenia     PE (pulmonary thromboembolism) ([facility] 2008    Plantar fasciitis of left foot     Tendinopathy Oct 2013    Right insertional Achilles tendinopathy    Thromboembolus ([facility]     blood clot in left leg then lung in 2007, then clot behind right knee 2009    Thromboembolus [facility])     also PE    Thyroid disease      Social History     Socioeconomic History    Marital status:      Spouse name: Not on file    Number of children: Not on file    Years of education: Not on file    Highest education level: Not on file   Tobacco Use    Smoking status: Current Every Day Smoker Packs/day: 1.50     Years: 10.00     Pack years: 15.00     Types: Cigarettes     Last attempt to quit: 2013     Years since quittin.5    Smokeless tobacco: Never Used    Tobacco comment: vapor pen   Substance and Sexual Activity    Alcohol use: No     Alcohol/week: 3.3 standard drinks     Types: 2 Glasses of wine, 2 Standard drinks or equivalent per week     Comment: once weekly; quit     Drug use: No     Family History   Problem Relation Age of Onset    Diabetes Other     Hypertension Other     Heart Disease Other     Arthritis-osteo Other     Stroke Mother 66    Stroke Father 36    Heart Attack Neg Hx         Review of Systems   Constitutional: Negative for chills, fever, malaise/fatigue and weight loss. Eyes: Negative for blurred vision. Respiratory: Negative for shortness of breath and wheezing. Cardiovascular: Negative for chest pain. Gastrointestinal: Negative for nausea and vomiting. Musculoskeletal: Negative for myalgias. Skin: Positive for itching and rash. Neurological: Negative for weakness. Vitals:    20 1014   BP: 118/62   Pulse: 72   Resp: 14   SpO2: 96%   Weight: 200 lb (90.7 kg)   Height: 5' 10\" (1.778 m)   PainSc:   1   PainLoc: Hand   LMP: 2013       Physical Exam  Vitals signs and nursing note reviewed. Neck:      Musculoskeletal: Normal range of motion and neck supple. Thyroid: No thyromegaly. Cardiovascular:      Rate and Rhythm: Normal rate and regular rhythm. Heart sounds: Normal heart sounds. Pulmonary:      Effort: Pulmonary effort is normal.      Breath sounds: Normal breath sounds. Musculoskeletal: Normal range of motion. Skin:     General: Skin is warm and dry. Findings: Lesion (on scalp non tender no drainage. ) present. Neurological:      Mental Status: She is alert and oriented to person, place, and time. Assessment/Plan      ICD-10-CM ICD-9-CM    1.  Hypothyroidism, unspecified type E03.9 244.9 levothyroxine (SYNTHROID) 137 mcg tablet   2. Acute deep vein thrombosis (DVT) of right lower extremity, unspecified vein (HCC) I82.401 453.40 rivaroxaban (XARELTO) 20 mg tab tablet   3. Morbid obesity (HCC) E66.01 278.01 phentermine (ADIPEX-P) 37.5 mg tablet   4. Other chronic pain G89.29 338.29 traMADol (ULTRAM) 50 mg tablet   5. Restless leg G25.81 333.94 rOPINIRole (REQUIP) 1 mg tablet   6. Seborrhea L21.9 706.3 ketoconazole (NIZORAL) 2 % shampoo      I have discussed the diagnosis with the patient and the intended plan of care as seen in the above orders. The patient has received an after-visit summary and questions were answered concerning future plans. I have discussed medication, side effects, and warnings with the patient in detail. The patient verbalized understanding and is in agreement with the plan of care. The patient will contact the office with any additional concerns.       lab results and schedule of future lab studies reviewed with patient    Moon Cano MD

## 2020-02-11 NOTE — PROGRESS NOTES
Pt in office for f/u medical conditions. Also states that cardiology suggested that she be switched from trazodone to Ambien.

## 2020-03-05 DIAGNOSIS — F51.01 PRIMARY INSOMNIA: ICD-10-CM

## 2020-03-05 RX ORDER — TRAZODONE HYDROCHLORIDE 100 MG/1
TABLET ORAL
Qty: 30 TAB | Refills: 1 | Status: SHIPPED | OUTPATIENT
Start: 2020-03-05 | End: 2020-05-05

## 2020-03-23 DIAGNOSIS — M79.10 MYALGIA: Primary | ICD-10-CM

## 2020-03-23 RX ORDER — TRAMADOL HYDROCHLORIDE 50 MG/1
TABLET ORAL
Qty: 30 TAB | Refills: 0 | Status: SHIPPED | OUTPATIENT
Start: 2020-03-23 | End: 2020-04-09 | Stop reason: SDUPTHER

## 2020-04-03 ENCOUNTER — TELEPHONE (OUTPATIENT)
Dept: FAMILY MEDICINE CLINIC | Age: 60
End: 2020-04-03

## 2020-04-03 DIAGNOSIS — I82.401 ACUTE DEEP VEIN THROMBOSIS (DVT) OF RIGHT LOWER EXTREMITY, UNSPECIFIED VEIN (HCC): ICD-10-CM

## 2020-04-03 NOTE — TELEPHONE ENCOUNTER
Received notification from [address] in [address] about Rivaroxaban 20mg Tablets. Pharmacy Comments:   \"Script Clarification need specific directions please  Resend with complete directions.  Thank you\"

## 2020-04-07 ENCOUNTER — TELEPHONE (OUTPATIENT)
Dept: FAMILY MEDICINE CLINIC | Age: 60
End: 2020-04-07

## 2020-04-07 NOTE — TELEPHONE ENCOUNTER
Ms. Arturo Perez called requesting if her requip can be increased. She has seen her neurologist, they suspect carpal tunnel is one issue and they Joyce Mccray MD) sent documentation here for Dr. Robin Weeks to order labs. She uses [facility] in [place]. She would like a return call to [phone].

## 2020-04-08 DIAGNOSIS — M79.10 MYALGIA: ICD-10-CM

## 2020-04-09 ENCOUNTER — VIRTUAL VISIT (OUTPATIENT)
Dept: FAMILY MEDICINE CLINIC | Age: 60
End: 2020-04-09

## 2020-04-09 DIAGNOSIS — G89.29 OTHER CHRONIC PAIN: Primary | ICD-10-CM

## 2020-04-09 DIAGNOSIS — G25.81 RESTLESS LEG: ICD-10-CM

## 2020-04-09 RX ORDER — TRAMADOL HYDROCHLORIDE 50 MG/1
50 TABLET ORAL
Qty: 30 TAB | Refills: 0 | Status: SHIPPED | OUTPATIENT
Start: 2020-04-09 | End: 2020-05-28 | Stop reason: SDUPTHER

## 2020-04-09 RX ORDER — TRAMADOL HYDROCHLORIDE 50 MG/1
TABLET ORAL
Qty: 30 TAB | Refills: 0 | OUTPATIENT
Start: 2020-04-09 | End: 2020-05-09

## 2020-04-09 RX ORDER — ROPINIROLE 2 MG/1
2 TABLET, FILM COATED ORAL
Qty: 30 TAB | Refills: 0 | Status: SHIPPED | OUTPATIENT
Start: 2020-04-09 | End: 2020-05-12 | Stop reason: ALTCHOICE

## 2020-04-15 ENCOUNTER — PATIENT MESSAGE (OUTPATIENT)
Dept: CARDIOLOGY CLINIC | Age: 60
End: 2020-04-15

## 2020-05-05 DIAGNOSIS — F51.01 PRIMARY INSOMNIA: ICD-10-CM

## 2020-05-05 DIAGNOSIS — E03.9 HYPOTHYROIDISM, UNSPECIFIED TYPE: ICD-10-CM

## 2020-05-05 DIAGNOSIS — G25.81 RESTLESS LEG: ICD-10-CM

## 2020-05-05 RX ORDER — LEVOTHYROXINE SODIUM 137 UG/1
TABLET ORAL
Qty: 30 TAB | Refills: 2 | Status: SHIPPED | OUTPATIENT
Start: 2020-05-05 | End: 2020-08-01

## 2020-05-05 RX ORDER — ROPINIROLE 1 MG/1
TABLET, FILM COATED ORAL
Qty: 30 TAB | Refills: 2 | Status: SHIPPED | OUTPATIENT
Start: 2020-05-05 | End: 2020-05-12 | Stop reason: DRUGHIGH

## 2020-05-05 RX ORDER — SERTRALINE HYDROCHLORIDE 50 MG/1
TABLET, FILM COATED ORAL
Qty: 30 TAB | Refills: 3 | Status: SHIPPED | OUTPATIENT
Start: 2020-05-05 | End: 2020-08-31

## 2020-05-05 RX ORDER — TRAZODONE HYDROCHLORIDE 100 MG/1
TABLET ORAL
Qty: 30 TAB | Refills: 1 | Status: SHIPPED | OUTPATIENT
Start: 2020-05-05 | End: 2020-07-02

## 2020-05-12 ENCOUNTER — VIRTUAL VISIT (OUTPATIENT)
Dept: FAMILY MEDICINE CLINIC | Age: 60
End: 2020-05-12

## 2020-05-12 DIAGNOSIS — G25.81 RESTLESS LEG: Primary | ICD-10-CM

## 2020-05-12 RX ORDER — ROPINIROLE 3 MG/1
TABLET, FILM COATED ORAL
Qty: 30 TAB | Refills: 1 | Status: SHIPPED | OUTPATIENT
Start: 2020-05-12 | End: 2020-07-02

## 2020-05-15 ENCOUNTER — VIRTUAL VISIT (OUTPATIENT)
Dept: CARDIOLOGY CLINIC | Age: 60
End: 2020-05-15

## 2020-05-15 VITALS
SYSTOLIC BLOOD PRESSURE: 128 MMHG | BODY MASS INDEX: 28.35 KG/M2 | HEIGHT: 70 IN | DIASTOLIC BLOOD PRESSURE: 86 MMHG | HEART RATE: 74 BPM | WEIGHT: 198 LBS

## 2020-05-15 DIAGNOSIS — G90.A POTS (POSTURAL ORTHOSTATIC TACHYCARDIA SYNDROME): Primary | ICD-10-CM

## 2020-05-15 DIAGNOSIS — Z86.711 HISTORY OF PULMONARY EMBOLISM: ICD-10-CM

## 2020-05-15 DIAGNOSIS — Z98.84 HISTORY OF GASTRIC BYPASS: ICD-10-CM

## 2020-05-15 DIAGNOSIS — E66.01 MORBID OBESITY (HCC): ICD-10-CM

## 2020-05-15 DIAGNOSIS — Z86.718 HISTORY OF DVT OF LOWER EXTREMITY: ICD-10-CM

## 2020-05-15 DIAGNOSIS — G89.29 OTHER CHRONIC PAIN: ICD-10-CM

## 2020-05-15 DIAGNOSIS — Z71.6 TOBACCO ABUSE COUNSELING: ICD-10-CM

## 2020-05-15 RX ORDER — CHOLECALCIFEROL (VITAMIN D3) 50 MCG
CAPSULE ORAL
COMMUNITY

## 2020-05-15 NOTE — PROGRESS NOTES
HISTORY OF PRESENT ILLNESS  Ingrid Ordonez is a 63 y.o. female. Ingrid Ordonez is a 63 y.o. female who was seen by synchronous (real-time) audio-video technology on 5/15/2020. Consent:  She and/or her healthcare decision maker is aware that this patient-initiated Telehealth encounter is a billable service, with coverage as determined by her insurance carrier. She is aware that she may receive a bill and has provided verbal consent to proceed: Yes    I was at home while conducting this encounter. 4/19 history of syncope about 2014 approximately. It happened after gastric bypass surgery when she had lost a lot of weight. 4/19 history of CP lower sternal, radiates to the back. No relation to activity or food. Lasting many hours. No radiation no associated nausea vomiting diaphoresis. Feels harder to breathe during the episode. No medicines. Spontaneous relief in few hours. 7/2019 C/O substernal chest pain with exertion. She continues to have dizziness and palpitations in the evenings. She denies having syncopal episode. Palpitations    The history is provided by the patient. This is a new problem. The current episode started more than 1 week ago (few weaks, feels a hard beat but the rate is still 50-60.). The problem has been rapidly improving. Associated symptoms include dizziness. Pertinent negatives include no fever, no malaise/fatigue, no chest pain, no claudication, no orthopnea, no PND, no nausea, no vomiting, no headaches, no cough and no shortness of breath. Dizziness   The history is provided by the patient. This is a new problem. The current episode started more than 1 week ago (yrs; see comments also). The problem occurs rarely (1-2/month). The problem has not changed (almost daily AMs) since onset. Pertinent negatives include no chest pain, no headaches and no shortness of breath. Exacerbated by: driving; quit driving 9/55.  The symptoms are relieved by rest (pull over if driving). Slow Heart Rate   The history is provided by the patient and medical records. This is a new (46s) problem. The current episode started more than 1 week ago. The problem has not changed since onset. Pertinent negatives include no chest pain, no headaches and no shortness of breath. Review of Systems   Constitutional: Negative for chills, fever, malaise/fatigue and weight loss. HENT: Negative for nosebleeds. Eyes: Negative for discharge. Respiratory: Negative for cough, shortness of breath and wheezing. Cardiovascular: Positive for palpitations. Negative for chest pain, orthopnea, claudication, leg swelling and PND. Gastrointestinal: Negative for diarrhea, nausea and vomiting. Genitourinary: Negative for dysuria and hematuria. Musculoskeletal: Negative for joint pain. Skin: Negative for rash. Neurological: Positive for dizziness. Negative for seizures, loss of consciousness and headaches. Endo/Heme/Allergies: Negative for polydipsia. Does not bruise/bleed easily. Psychiatric/Behavioral: Negative for depression and substance abuse. The patient does not have insomnia.       No Known Allergies    Past Medical History:   Diagnosis Date    Arthritis     Chronic obstructive pulmonary disease (HCC)     hx pe    Coagulation disorder (HCC)     clotting disorder    Hx of colonic polyps     HX OTHER MEDICAL     pulmonary embolism x 2    HX OTHER MEDICAL     PCOS    HX OTHER MEDICAL     Heart monitor 1/2015     Hypertension     no longer on meds since gastric bypass    Long term (current) use of anticoagulants     Osteopenia     PE (pulmonary thromboembolism) ([address] 2008    Plantar fasciitis of left foot     Tendinopathy Oct 2013    Right insertional Achilles tendinopathy    Thromboembolus ([address]     blood clot in left leg then lung in 2007, then clot behind right knee 2009    Thromboembolus [facility])     also PE    Thyroid disease        Family History   Problem Relation Age of Onset    Diabetes Other     Hypertension Other     Heart Disease Other     Arthritis-osteo Other     Stroke Mother 66    Stroke Father 36    Heart Attack Neg Hx        Social History     Tobacco Use    Smoking status: Current Every Day Smoker     Packs/day: 1.50     Years: 10.00     Pack years: 15.00     Types: Cigarettes     Last attempt to quit: 2013     Years since quittin.7    Smokeless tobacco: Never Used    Tobacco comment: vapor pen   Substance Use Topics    Alcohol use: No     Alcohol/week: 3.3 standard drinks     Types: 2 Glasses of wine, 2 Standard drinks or equivalent per week     Comment: once weekly; quit 2018    Drug use: No        Current Outpatient Medications   Medication Sig    B.infantis-B.ani-B.long-B.bifi (Probiotic 4X) 10-15 mg TbEC Take  by mouth.  vit B complex-C-folic ac-zinc 864 mcg- 12.5 mg tab Take  by mouth.  rOPINIRole (REQUIP) 3 mg tab tab TAKE 1 TABLET BY MOUTH EVERY DAY    traZODone (DESYREL) 100 mg tablet TAKE 1 TABLET BY MOUTH EVERY DAY EVERY NIGHT (Patient taking differently: as needed.)    levothyroxine (SYNTHROID) 137 mcg tablet TAKE 1 TABLET BY MOUTH DAILY (BEFORE BREAKFAST).  sertraline (ZOLOFT) 50 mg tablet TAKE 1 TABLET BY MOUTH EVERY DAY    phentermine (ADIPEX-P) 37.5 mg tablet Take 1 Tab by mouth every morning. Max Daily Amount: 37.5 mg.    ketoconazole (NIZORAL) 2 % shampoo Use as directed    calcium citrate-vitamin d3 (CITRACAL+D) 315-200 mg-unit tab Take 1 Tab by mouth daily (with breakfast).  melatonin 3 mg tablet Take  by mouth. 6mg-9 mg as needed    midodrine (PROAMITINE) 5 mg tablet Take 1 Tab by mouth three (3) times daily.  comprs. stocking,thigh,long,med (COMP. STOCKING,THIGH,LONG,MED.) misc 2 Packages by Does Not Apply route daily as needed (edema). Remove when lying down    multivitamin (ONE A DAY) tablet Take 1 Tab by mouth two (2) times a day.     Cholecalciferol, Vitamin D3, (VITAMIN D3) 1,000 unit chew Take 2,000 Units by mouth daily. No current facility-administered medications for this visit. Past Surgical History:   Procedure Laterality Date    HX DILATION AND CURETTAGE      ablation    HX DILATION AND CURETTAGE      age 17    HX GASTRIC BYPASS  1/15/2014    HX TUBAL LIGATION  1996       Visit Vitals  /86   Pulse 74   Ht 5' 10\" (1.778 m)   Wt 89.8 kg (198 lb)   LMP 12/31/2013   BMI 28.41 kg/m²     Vitals:    05/15/20 1124   BP: 128/86   Pulse: 74   Weight: 89.8 kg (198 lb)   Height: 5' 10\" (1.778 m)         Diagnostic Studies:  I have reviewed the relevant tests done on the patient and show as follows  EKG tracings reviewed by me today. EKG Results     None        XR Results (most recent):  Results from Hospital Encounter encounter on 12/09/13   XR CHEST PA LAT    Narrative History: Preop    PA AND LATERAL CHEST 2 VIEWS    CPT: 62996    The heart and lungs and bony structures appear unremarkable for a patient of  this age. Impression IMPRESSION:    Normal chest.       No flowsheet data found. 4/19 echo  Interpretation Summary        · Normal cavity size, systolic function (ejection fraction normal) and diastolic function. Mild concentric hypertrophy. The estimated ejection fraction is 56 - 60%. No regional wall motion abnormality noted. · Normal right ventricular size and function. · Mild tricuspid valve regurgitation is present. Pulmonary arterial systolic pressure is 79.3 mmHg. There is no evidence of pulmonary hypertension. · Mildly elevated central venous pressure (5-10 mmHg); IVC diameter is less than 21 mm and collapses less than 50% with respiration. Comparison Study Information   Prior Study   There is a prior study available for comparison that was performed on 11/25/2014. As compared to the previous study, there are no significant changes. 7/19 TMT  Interpretation Summary     · Baseline ECG: Normal sinus rhythm.   · Negative stress test.  · Low risk [facility] treadmill score.  · Appropriate heart rate and blood pressure response to exercise. · Appropriate heart rate recovery   3/15 cardiac cath   1. No significant coronary artery disease  2. Normal left ventricular systolic function with LV EF 60%  3. Normal left ventricular end-diastolic pressure. RECOMMENDATIONS:   1. Risk factor modification including exercise. 2. Evaluation for noncardiac etiologies of chest discomfort. CT CTA CHEST PULMONARY3/25/2019  [facility]  Result Impression     1. No CT evidence of pulmonary thromboembolism. 2. Calcification in the LAD coronary artery. 5/2019 - Event monitor   No evidence of severe roberto or tachy arrhythmias. Patient markers associated with sinus rhythm    Ms. Casi Troncoso has a reminder for a \"due or due soon\" health maintenance. I have asked that she contact her primary care provider for follow-up on this health maintenance. Physical Exam   Constitutional: She is oriented to person, place, and time. She appears well-developed and well-nourished. No distress. HENT:   Head: Normocephalic and atraumatic. Nose: Nose normal.   Eyes: Conjunctivae and EOM are normal. Right eye exhibits no discharge. Left eye exhibits no discharge. No scleral icterus. Neck: Normal range of motion. No JVD present. No thyromegaly present. Pulmonary/Chest: Effort normal. No accessory muscle usage or stridor. Abdominal: Soft. She exhibits no distension. Musculoskeletal: Normal range of motion. General: No edema. Neurological: She is alert and oriented to person, place, and time. Gait normal.   No obvious facial asymmetry   Skin: No rash noted. No erythema. Psychiatric: She has a normal mood and affect. Her behavior is normal.       ASSESSMENT and PLAN    Patient advised to stop smoking to reduce future cardiovascular events. As she has significantly improved by following lifestyle changes as well as Midodrin. Unfortunately, she continues to smoke.   We discussed again and advised her to stop smoking as future significant COPD may cause POTS symptoms to become even worse. She is using her compression stockings. Since the blood pressure is staying better she wanted to see if the dose of midodrine can be reduced or she can skip sometimes. I agree with that and I have given her the liberty to try to reduce the dose by half or skip the medicine sometimes and let me know next time how much she consumes. Diagnoses and all orders for this visit:    1. POTS (postural orthostatic tachycardia syndrome)  -     METABOLIC PANEL, BASIC; Future  -     CBC W/O DIFF; Future    2. History of pulmonary embolism    3. Tobacco abuse counseling    4. History of gastric bypass    5. History of DVT of lower extremity        Pertinent laboratory and test data reviewed and discussed with patient. See patient instructions also for other medical advice given    Medications Discontinued During This Encounter   Medication Reason    rivaroxaban (Xarelto) 20 mg tab tablet Therapy Completed    cyanocobalamin (VITAMIN B-12) 500 mcg tablet Therapy Completed    thiamine HCl (VITAMIN B-1 PO) Therapy Completed       Follow-up and Dispositions    · Return in about 6 months (around 11/15/2020), or if symptoms worsen or fail to improve, for post test.         Vital Signs: (As obtained by patient/caregiver at home)  Visit Vitals  /86   Pulse 74   Ht 5' 10\" (1.778 m)   Wt 89.8 kg (198 lb)   LMP 12/31/2013   BMI 28.41 kg/m²          Other pertinent observable physical exam findings:-      We discussed the expected course, resolution and complications of the diagnosis(es) in detail. Medication risks, benefits, costs, interactions, and alternatives were discussed as indicated. I advised her to contact the office if her condition worsens, changes or fails to improve as anticipated. She expressed understanding with the diagnosis(es) and plan.      Pursuant to the emergency declaration under the [facility] Act and the [address] [facility] authority and the [employer] Act, this Virtual  Visit was conducted, with patient's consent, to reduce the patient's risk of exposure to COVID-19 and provide continuity of care for an established patient. Services were provided through a video synchronous discussion virtually to substitute for in-person clinic visit.     Haim Berger MD

## 2020-05-15 NOTE — PROGRESS NOTES
1. Have you been to the ER, urgent care clinic since your last visit? Hospitalized since your last visit?     no  2. Have you seen or consulted any other health care providers outside of the [address] [place] [address] since your last visit? Include any pap smears or colon screening. No     3. Since your last visit, have you had any of the following symptoms?   no       4. Have you had any blood work, X-rays or cardiac testing? No         5. Where do you normally have your labs drawn? [facility]  6. Do you need any refills today?    no

## 2020-05-15 NOTE — LETTER
Niya Abrahan 1960 5/15/2020 Dear MD Jacqueline Guillory MD 
Ramandeepwicho May MD 
 
I had the pleasure of evaluating  Ms. Woody Dixon in office today. Below are the relevant portions of my assessment and plan of care. ICD-10-CM ICD-9-CM 1. POTS (postural orthostatic tachycardia syndrome) H29.8 306.57 METABOLIC PANEL, BASIC  
   CBC W/O DIFF 2. History of pulmonary embolism Z86.711 V12.55 3. Tobacco abuse counseling Z71.6 V65.42   
  305.1 4. History of gastric bypass Z98.84 V45.86   
5. History of DVT of lower extremity Z86.718 V12.51 Current Outpatient Medications Medication Sig Dispense Refill  B.infantis-B.ani-B.long-B.bifi (Probiotic 4X) 10-15 mg TbEC Take  by mouth.  vit B complex-C-folic ac-zinc 279 mcg- 12.5 mg tab Take  by mouth.  rOPINIRole (REQUIP) 3 mg tab tab TAKE 1 TABLET BY MOUTH EVERY DAY 30 Tab 1  
 traZODone (DESYREL) 100 mg tablet TAKE 1 TABLET BY MOUTH EVERY DAY EVERY NIGHT (Patient taking differently: as needed.) 30 Tab 1  
 levothyroxine (SYNTHROID) 137 mcg tablet TAKE 1 TABLET BY MOUTH DAILY (BEFORE BREAKFAST). 30 Tab 2  
 sertraline (ZOLOFT) 50 mg tablet TAKE 1 TABLET BY MOUTH EVERY DAY 30 Tab 3  phentermine (ADIPEX-P) 37.5 mg tablet Take 1 Tab by mouth every morning. Max Daily Amount: 37.5 mg. 30 Tab 1  ketoconazole (NIZORAL) 2 % shampoo Use as directed 1 Bottle 1  
 calcium citrate-vitamin d3 (CITRACAL+D) 315-200 mg-unit tab Take 1 Tab by mouth daily (with breakfast).  melatonin 3 mg tablet Take  by mouth. 6mg-9 mg as needed  midodrine (PROAMITINE) 5 mg tablet Take 1 Tab by mouth three (3) times daily. 90 Tab 5  comprs. stocking,thigh,long,med (COMP. STOCKING,THIGH,LONG,MED.) misc 2 Packages by Does Not Apply route daily as needed (edema). Remove when lying down 2 Package 0  
 multivitamin (ONE A DAY) tablet Take 1 Tab by mouth two (2) times a day.  Cholecalciferol, Vitamin D3, (VITAMIN D3) 1,000 unit chew Take 2,000 Units by mouth daily. Orders Placed This Encounter  METABOLIC PANEL, BASIC Standing Status:   Future Standing Expiration Date:   8/15/2020  CBC W/O DIFF Standing Status:   Future Standing Expiration Date:   8/15/2020  
 B.infantis-B.ani-B.long-B.bifi (Probiotic 4X) 10-15 mg TbEC Sig: Take  by mouth.  vit B complex-C-folic ac-zinc 565 mcg- 12.5 mg tab Sig: Take  by mouth. If you have questions, please do not hesitate to call me. I look forward to following Ms. Samantha Dubois along with you. Sincerely, Dali Pinon MD

## 2020-05-15 NOTE — PATIENT INSTRUCTIONS
Medications Discontinued During This Encounter Medication Reason  rivaroxaban (Xarelto) 20 mg tab tablet Therapy Completed  cyanocobalamin (VITAMIN B-12) 500 mcg tablet Therapy Completed  thiamine HCl (VITAMIN B-1 PO) Therapy Completed Learning About Benefits From Quitting Smoking How does quitting smoking make you healthier? If you're thinking about quitting smoking, you may have a few reasons to be smoke-free. Your health may be one of them. · When you quit smoking, you lower your risks for cancer, lung disease, heart attack, stroke, blood vessel disease, and blindness from macular degeneration. · When you're smoke-free, you get sick less often, and you heal faster. You are less likely to get colds, flu, bronchitis, and pneumonia. · As a nonsmoker, you may find that your mood is better and you are less stressed. When and how will you feel healthier? Quitting has real health benefits that start from day 1 of being smoke-free. And the longer you stay smoke-free, the healthier you get and the better you feel. The first hours · After just 20 minutes, your blood pressure and heart rate go down. That means there's less stress on your heart and blood vessels. · Within 12 hours, the level of carbon monoxide in your blood drops back to normal. That makes room for more oxygen. With more oxygen in your body, you may notice that you have more energy than when you smoked. After 2 weeks · Your lungs start to work better. · Your risk of heart attack starts to drop. After 1 month · When your lungs are clear, you cough less and breathe deeper, so it's easier to be active. · Your sense of taste and smell return. That means you can enjoy food more than you have since you started smoking. Over the years · After 1 year, your risk of heart disease is half what it would be if you kept smoking. · After 5 years, your risk of stroke starts to shrink.  Within a few years after that, it's about the same as if you'd never smoked. · After 10 years, your risk of dying from lung cancer is cut by about half. And your risk for many other types of cancer is lower too. How would quitting help others in your life? When you quit smoking, you improve the health of everyone who now breathes in your smoke. · Their heart, lung, and cancer risks drop, much like yours. · They are sick less. For babies and small children, living smoke-free means they're less likely to have ear infections, pneumonia, and bronchitis. · If you're a woman who is or will be pregnant someday, quitting smoking means a healthier . · Children who are close to you are less likely to become adult smokers. Where can you learn more? Go to http://brian-cindy.info/ Enter [id number] 11 in the search box to learn more about \"Learning About Benefits From Quitting Smoking. \" Current as of: 2019Content Version: 12.4 © 8410-1385 [employer]. Care instructions adapted under license by [employer] (which disclaims liability or warranty for this information). If you have questions about a medical condition or this instruction, always ask your healthcare professional. [place] [address] any warranty or liability for your use of this information.

## 2020-05-19 RX ORDER — PHENTERMINE HYDROCHLORIDE 37.5 MG/1
TABLET ORAL
Qty: 30 TAB | Refills: 1 | OUTPATIENT
Start: 2020-05-19

## 2020-05-19 RX ORDER — TRAMADOL HYDROCHLORIDE 50 MG/1
TABLET ORAL
Qty: 30 TAB | Refills: 0 | OUTPATIENT
Start: 2020-05-19

## 2020-05-21 NOTE — TELEPHONE ENCOUNTER
Patient called to inquire about refusal of Phentermine and Tramadol. Pt did have a virtual appt on 5/12/2020. I let her know I would send a message back and ask for her.

## 2020-05-28 DIAGNOSIS — G89.29 OTHER CHRONIC PAIN: ICD-10-CM

## 2020-05-28 DIAGNOSIS — E66.01 MORBID OBESITY (HCC): ICD-10-CM

## 2020-05-28 NOTE — TELEPHONE ENCOUNTER
This patient contacted office for the following prescriptions to be filled:    Medication requested :   Requested Prescriptions     Pending Prescriptions Disp Refills    phentermine (ADIPEX-P) 37.5 mg tablet 30 Tab 1     Sig: Take 1 Tab by mouth every morning. Max Daily Amount: 37.5 mg.    traMADoL (ULTRAM) 50 mg tablet 30 Tab 0     Sig: Take 1 Tab by mouth every eight (8) hours as needed for Pain for up to 30 days. Max Daily Amount: 150 mg.        PCP: Dr. Connor Cancino or Print: Pharmacy  Mail order or Local pharmacy: Local     Scheduled appointment if not seen by current providers in office: Last office visit 05/12/20 virtual

## 2020-05-29 RX ORDER — TRAMADOL HYDROCHLORIDE 50 MG/1
50 TABLET ORAL
Qty: 30 TAB | Refills: 0 | Status: SHIPPED | OUTPATIENT
Start: 2020-05-29 | End: 2020-07-29

## 2020-05-29 RX ORDER — PHENTERMINE HYDROCHLORIDE 37.5 MG/1
37.5 TABLET ORAL
Qty: 30 TAB | Refills: 1 | OUTPATIENT
Start: 2020-05-29

## 2020-06-02 DIAGNOSIS — I82.401 ACUTE DEEP VEIN THROMBOSIS (DVT) OF RIGHT LOWER EXTREMITY, UNSPECIFIED VEIN (HCC): ICD-10-CM

## 2020-06-02 RX ORDER — RIVAROXABAN 20 MG/1
TABLET, FILM COATED ORAL
Qty: 30 TAB | Refills: 1 | Status: SHIPPED | OUTPATIENT
Start: 2020-06-02 | End: 2020-08-01

## 2020-07-02 DIAGNOSIS — F51.01 PRIMARY INSOMNIA: ICD-10-CM

## 2020-07-02 DIAGNOSIS — G25.81 RESTLESS LEG: ICD-10-CM

## 2020-07-02 RX ORDER — TRAZODONE HYDROCHLORIDE 100 MG/1
TABLET ORAL
Qty: 30 TAB | Refills: 1 | Status: SHIPPED | OUTPATIENT
Start: 2020-07-02 | End: 2020-08-31

## 2020-07-02 RX ORDER — ROPINIROLE 3 MG/1
TABLET, FILM COATED ORAL
Qty: 30 TAB | Refills: 1 | Status: SHIPPED | OUTPATIENT
Start: 2020-07-02 | End: 2020-08-31

## 2020-07-29 DIAGNOSIS — G89.29 OTHER CHRONIC PAIN: ICD-10-CM

## 2020-07-29 DIAGNOSIS — E66.01 MORBID OBESITY (HCC): ICD-10-CM

## 2020-07-29 RX ORDER — TRAMADOL HYDROCHLORIDE 50 MG/1
TABLET ORAL
Qty: 30 TAB | Refills: 0 | Status: SHIPPED | OUTPATIENT
Start: 2020-07-29 | End: 2020-08-28

## 2020-07-29 RX ORDER — PHENTERMINE HYDROCHLORIDE 37.5 MG/1
TABLET ORAL
Qty: 30 TAB | Refills: 1 | OUTPATIENT
Start: 2020-07-29

## 2020-07-31 NOTE — PROGRESS NOTES
HISTORY OF PRESENT ILLNESS  Ervin Martin is a 63 y.o. female. 4/19 history of syncope about 2014 approximately. It happened after gastric bypass surgery when she had lost a lot of weight. 4/19 history of CP lower sternal, radiates to the back. No relation to activity or food. Lasting many hours. No radiation no associated nausea vomiting diaphoresis. Feels harder to breathe during the episode. No medicines. Spontaneous relief in few hours. Slow Heart Rate   The history is provided by the patient and medical records. This is a new problem. The current episode started more than 1 week ago. Associated symptoms include chest pain. Pertinent negatives include no headaches and no shortness of breath. Dizziness   The history is provided by the patient. This is a new problem. The current episode started more than 1 week ago (yrs; see comments also). The problem occurs rarely (once in a few months). The problem has been gradually worsening (almost daily AMs). Associated symptoms include chest pain. Pertinent negatives include no headaches and no shortness of breath. Exacerbated by: driving. The symptoms are relieved by rest (pull over if driving). Palpitations    The history is provided by the patient. This is a new problem. The current episode started more than 1 week ago (few weaks, feels a hard beat but the rate is still 50-60.). Associated symptoms include chest pain and dizziness. Pertinent negatives include no fever, no malaise/fatigue, no claudication, no orthopnea, no PND, no nausea, no vomiting, no headaches, no cough and no shortness of breath. Review of Systems   Constitutional: Negative for chills, fever, malaise/fatigue and weight loss. HENT: Negative for nosebleeds. Eyes: Negative for discharge. Respiratory: Negative for cough, shortness of breath and wheezing. Cardiovascular: Positive for chest pain and palpitations.  Negative for orthopnea, claudication, leg swelling and PND.   Gastrointestinal: Negative for diarrhea, nausea and vomiting. Genitourinary: Negative for dysuria and hematuria. Musculoskeletal: Negative for joint pain. Skin: Negative for rash. Neurological: Positive for dizziness. Negative for seizures, loss of consciousness and headaches. Endo/Heme/Allergies: Negative for polydipsia. Does not bruise/bleed easily. Psychiatric/Behavioral: Negative for depression and substance abuse. The patient does not have insomnia.       No Known Allergies    Past Medical History:   Diagnosis Date    Arthritis     Chronic obstructive pulmonary disease (HCC)     hx pe    Coagulation disorder (HCC)     clotting disorder    Hx of colonic polyps     HX OTHER MEDICAL     pulmonary embolism x 2    HX OTHER MEDICAL     PCOS    HX OTHER MEDICAL     Heart monitor 2015     Hypertension     no longer on meds since gastric bypass    Long term (current) use of anticoagulants     Osteopenia     PE (pulmonary thromboembolism) ([address]     Plantar fasciitis of left foot     Tendinopathy Oct 2013    Right insertional Achilles tendinopathy    Thromboembolus ([address]     blood clot in left leg then lung in , then clot behind right knee     Thromboembolus ([address]     also PE    Thyroid disease        Family History   Problem Relation Age of Onset    Diabetes Other     Hypertension Other     Heart Disease Other     Arthritis-osteo Other     Stroke Mother 66    Stroke Father 36    Heart Attack Neg Hx        Social History     Tobacco Use    Smoking status: Current Every Day Smoker     Packs/day: 1.50     Years: 10.00     Pack years: 15.00     Types: Cigarettes     Last attempt to quit: 2013     Years since quittin.7    Smokeless tobacco: Never Used    Tobacco comment: vapor pen   Substance Use Topics    Alcohol use: No     Alcohol/week: 2.0 oz     Types: 2 Glasses of wine, 2 Standard drinks or equivalent per week     Comment: once weekly; quit     Drug use: No        Current Outpatient Medications   Medication Sig    midodrine (PROAMITINE) 10 mg tablet Take 1 Tab by mouth three (3) times daily (with meals) for 30 days.  levothyroxine (SYNTHROID) 137 mcg tablet Take 137 mcg by mouth Daily (before breakfast).  citalopram (CELEXA) 20 mg tablet Take 1 Tab by mouth daily.  thiamine HCl (VITAMIN B-1 PO) Take  by mouth.  rivaroxaban (XARELTO) 20 mg tab tablet Take 1 Tab by mouth daily. To follow up with  [facility] regarding when to stop medication and start Lovenox.  cyanocobalamin (VITAMIN B-12) 500 mcg tablet Take 500 mcg by mouth daily.  multivitamin (ONE A DAY) tablet Take 1 Tab by mouth two (2) times a day.  calcium citrate-vitamin D3 (UPCAL D) 500 mg-500 unit /5 gram powd Take 500 mg by mouth three (3) times daily.  BIOTIN PO Take  by mouth.  Cholecalciferol, Vitamin D3, (VITAMIN D3) 1,000 unit chew Take 2,000 Units by mouth daily. No current facility-administered medications for this visit. Past Surgical History:   Procedure Laterality Date    HX DILATION AND CURETTAGE      ablation    HX DILATION AND CURETTAGE      age 17    HX GASTRIC BYPASS  1/15/2014    HX TUBAL LIGATION  1996       Visit Vitals  /77   Pulse (!) 59   Ht 5' 10\" (1.778 m)   Wt 94.8 kg (209 lb)   LMP 12/31/2013   BMI 29.99 kg/m²     Vitals:    05/17/19 0808 05/17/19 0850 05/17/19 0853   BP: 120/77 115/78 114/87   BP 1 Location:  Left arm Left arm   BP Patient Position:  Supine Standing   Pulse: (!) 59 (!) 51 70   Weight: 94.8 kg (209 lb)     Height: 5' 10\" (1.778 m)           Diagnostic Studies:  I have reviewed the relevant tests done on the patient and show as follows  EKG tracings reviewed by me today.     EKG Results     None        XR Results (most recent):  Results from Hospital Encounter encounter on 12/09/13   XR CHEST PA LAT    Narrative History: Preop    PA AND LATERAL CHEST 2 VIEWS    CPT: 03156    The heart and lungs and bony structures appear unremarkable for a patient of  this age. Impression IMPRESSION:    Normal chest.       No flowsheet data found. 4/19 echo  Interpretation Summary        · Normal cavity size, systolic function (ejection fraction normal) and diastolic function. Mild concentric hypertrophy. The estimated ejection fraction is 56 - 60%. No regional wall motion abnormality noted. · Normal right ventricular size and function. · Mild tricuspid valve regurgitation is present. Pulmonary arterial systolic pressure is 88.3 mmHg. There is no evidence of pulmonary hypertension. · Mildly elevated central venous pressure (5-10 mmHg); IVC diameter is less than 21 mm and collapses less than 50% with respiration.               Comparison Study Information     Prior Study     There is a prior study available for comparison that was performed on 11/25/2014. As compared to the previous study, there are no significant changes. Ms. Leonel Schwartz has a reminder for a \"due or due soon\" health maintenance. I have asked that she contact her primary care provider for follow-up on this health maintenance. Physical Exam   Constitutional: She is oriented to person, place, and time. She appears well-developed and well-nourished. No distress. Tall stature   HENT:   Head: Normocephalic and atraumatic. Mouth/Throat: Normal dentition. Eyes: Right eye exhibits no discharge. Left eye exhibits no discharge. No scleral icterus. Neck: Neck supple. No JVD present. Carotid bruit is not present. No thyromegaly present. Cardiovascular: Normal rate, regular rhythm, S1 normal, S2 normal, normal heart sounds and intact distal pulses. Exam reveals no gallop and no friction rub. No murmur heard. Pulmonary/Chest: Effort normal and breath sounds normal. She has no wheezes. She has no rales. Abdominal: Soft. She exhibits no mass. There is no tenderness. Musculoskeletal: She exhibits no edema.    Lymphadenopathy:        Right cervical: No superficial cervical adenopathy present. Left cervical: No superficial cervical adenopathy present. Neurological: She is alert and oriented to person, place, and time. Skin: Skin is warm and dry. No rash noted. Psychiatric: She has a normal mood and affect. Her behavior is normal.   CONCLUSIONS:  2015  1. No significant coronary artery disease  2. Normal left ventricular systolic function with LV EF 60%  3. Normal left ventricular end-diastolic pressure. RECOMMENDATIONS:   1. Risk factor modification including exercise. 2. Evaluation for noncardiac etiologies of chest discomfort. CT CTA CHEST PULMONARY3/25/2019  [facility]  Result Impression     1. No CT evidence of pulmonary thromboembolism. 2. Calcification in the LAD coronary artery. ASSESSMENT and PLAN    Midodrin started by Dr. James Taylor and then increased by our nurse practitioner Brionna Cage. Patient had high blood pressure and tachycardia along with symptoms of dizziness when midodrine was increased. It has not helped her symptoms much so I will reduce it back today to 5 mg 3 times a day. It seems her symptoms have become worse in the last 2 months since she started Celexa which will be discontinued today. Patient wanted Chantix and she understands the side effects she her daughter lives with her and her . They will watch her for any side effects and start her back was prescribed. Diagnoses and all orders for this visit:    1. Palpitations    2. Dizziness  -     midodrine (PROAMITINE) 5 mg tablet; Take 1 Tab by mouth three (3) times daily (with meals) for 30 days. -     comprs. stocking,thigh,long,med (COMP. STOCKING,THIGH,LONG,MED.) misc; 2 Packages by Does Not Apply route daily as needed (edema). Remove when lying down    3. History of pulmonary embolism    4. History of DVT of lower extremity    5. History of gastric bypass    6. Bradycardia    7.  Tobacco abuse counseling  -     varenicline (CHANTIX STARTER NANI) 0.5 mg (11)- 1 mg (42) DsPk; Take 0.5 mg by mouth two (2) times daily (after meals). Pertinent laboratory and test data reviewed and discussed with patient. See patient instructions also for other medical advice given    Medications Discontinued During This Encounter   Medication Reason    midodrine (PROAMITINE) 10 mg tablet     citalopram (CELEXA) 20 mg tablet Side Effects       Follow-up and Dispositions    · Return in about 3 months (around 8/17/2019), or if symptoms worsen or fail to improve. Myalgia Treatment: I explained this is common when taking isotretinoin. If this worsens they will contact us. They may try OTC ibuprofen.

## 2020-08-01 DIAGNOSIS — I82.401 ACUTE DEEP VEIN THROMBOSIS (DVT) OF RIGHT LOWER EXTREMITY, UNSPECIFIED VEIN (HCC): ICD-10-CM

## 2020-08-01 DIAGNOSIS — E03.9 HYPOTHYROIDISM, UNSPECIFIED TYPE: ICD-10-CM

## 2020-08-01 RX ORDER — RIVAROXABAN 20 MG/1
TABLET, FILM COATED ORAL
Qty: 30 TAB | Refills: 1 | Status: SHIPPED | OUTPATIENT
Start: 2020-08-01 | End: 2020-09-28

## 2020-08-01 RX ORDER — LEVOTHYROXINE SODIUM 137 UG/1
TABLET ORAL
Qty: 30 TAB | Refills: 2 | Status: SHIPPED | OUTPATIENT
Start: 2020-08-01 | End: 2020-10-26

## 2020-08-29 DIAGNOSIS — F51.01 PRIMARY INSOMNIA: ICD-10-CM

## 2020-08-29 DIAGNOSIS — G25.81 RESTLESS LEG: ICD-10-CM

## 2020-08-31 RX ORDER — SERTRALINE HYDROCHLORIDE 50 MG/1
TABLET, FILM COATED ORAL
Qty: 30 TAB | Refills: 3 | Status: SHIPPED | OUTPATIENT
Start: 2020-08-31 | End: 2020-12-28

## 2020-08-31 RX ORDER — TRAZODONE HYDROCHLORIDE 100 MG/1
TABLET ORAL
Qty: 30 TAB | Refills: 1 | Status: SHIPPED | OUTPATIENT
Start: 2020-08-31 | End: 2020-10-26

## 2020-08-31 RX ORDER — ROPINIROLE 3 MG/1
TABLET, FILM COATED ORAL
Qty: 30 TAB | Refills: 1 | Status: SHIPPED | OUTPATIENT
Start: 2020-08-31 | End: 2020-10-26

## 2020-09-26 DIAGNOSIS — I82.401 ACUTE DEEP VEIN THROMBOSIS (DVT) OF RIGHT LOWER EXTREMITY, UNSPECIFIED VEIN (HCC): ICD-10-CM

## 2020-09-28 RX ORDER — RIVAROXABAN 20 MG/1
TABLET, FILM COATED ORAL
Qty: 30 TAB | Refills: 1 | Status: SHIPPED | OUTPATIENT
Start: 2020-09-28 | End: 2020-11-25

## 2020-10-24 DIAGNOSIS — E03.9 HYPOTHYROIDISM, UNSPECIFIED TYPE: ICD-10-CM

## 2020-10-24 DIAGNOSIS — G25.81 RESTLESS LEG: ICD-10-CM

## 2020-10-24 DIAGNOSIS — F51.01 PRIMARY INSOMNIA: ICD-10-CM

## 2020-10-26 DIAGNOSIS — G90.A POTS (POSTURAL ORTHOSTATIC TACHYCARDIA SYNDROME): ICD-10-CM

## 2020-10-26 RX ORDER — TRAZODONE HYDROCHLORIDE 100 MG/1
TABLET ORAL
Qty: 30 TAB | Refills: 1 | Status: SHIPPED | OUTPATIENT
Start: 2020-10-26 | End: 2020-12-28

## 2020-10-26 RX ORDER — LEVOTHYROXINE SODIUM 137 UG/1
TABLET ORAL
Qty: 30 TAB | Refills: 2 | Status: SHIPPED | OUTPATIENT
Start: 2020-10-26 | End: 2021-01-25

## 2020-10-26 RX ORDER — ROPINIROLE 3 MG/1
TABLET, FILM COATED ORAL
Qty: 30 TAB | Refills: 1 | Status: SHIPPED | OUTPATIENT
Start: 2020-10-26 | End: 2020-12-28

## 2020-10-26 RX ORDER — MIDODRINE HYDROCHLORIDE 5 MG/1
5 TABLET ORAL 3 TIMES DAILY
Qty: 90 TAB | Refills: 5 | Status: SHIPPED | OUTPATIENT
Start: 2020-10-26 | End: 2021-05-27 | Stop reason: ALTCHOICE

## 2020-10-26 NOTE — TELEPHONE ENCOUNTER
Requested Prescriptions     Pending Prescriptions Disp Refills    midodrine (PROAMATINE) 5 mg tablet 90 Tab 5     Sig: Take 1 Tab by mouth three (3) times daily.

## 2020-11-25 DIAGNOSIS — I82.401 ACUTE DEEP VEIN THROMBOSIS (DVT) OF RIGHT LOWER EXTREMITY, UNSPECIFIED VEIN (HCC): ICD-10-CM

## 2020-11-25 RX ORDER — RIVAROXABAN 20 MG/1
TABLET, FILM COATED ORAL
Qty: 30 TAB | Refills: 1 | Status: SHIPPED | OUTPATIENT
Start: 2020-11-25 | End: 2021-01-25

## 2020-12-26 DIAGNOSIS — G25.81 RESTLESS LEG: ICD-10-CM

## 2020-12-26 DIAGNOSIS — F51.01 PRIMARY INSOMNIA: ICD-10-CM

## 2020-12-28 RX ORDER — TRAZODONE HYDROCHLORIDE 100 MG/1
TABLET ORAL
Qty: 30 TAB | Refills: 1 | Status: SHIPPED | OUTPATIENT
Start: 2020-12-28 | End: 2021-02-22

## 2020-12-28 RX ORDER — SERTRALINE HYDROCHLORIDE 50 MG/1
TABLET, FILM COATED ORAL
Qty: 30 TAB | Refills: 3 | Status: SHIPPED | OUTPATIENT
Start: 2020-12-28 | End: 2021-05-27 | Stop reason: SDUPTHER

## 2020-12-28 RX ORDER — ROPINIROLE 3 MG/1
TABLET, FILM COATED ORAL
Qty: 30 TAB | Refills: 1 | Status: SHIPPED | OUTPATIENT
Start: 2020-12-28 | End: 2021-02-22

## 2021-01-24 DIAGNOSIS — I82.401 ACUTE DEEP VEIN THROMBOSIS (DVT) OF RIGHT LOWER EXTREMITY, UNSPECIFIED VEIN (HCC): ICD-10-CM

## 2021-01-24 DIAGNOSIS — E03.9 HYPOTHYROIDISM, UNSPECIFIED TYPE: ICD-10-CM

## 2021-01-25 RX ORDER — LEVOTHYROXINE SODIUM 137 UG/1
TABLET ORAL
Qty: 30 TAB | Refills: 2 | Status: SHIPPED | OUTPATIENT
Start: 2021-01-25 | End: 2021-06-01

## 2021-01-25 RX ORDER — RIVAROXABAN 20 MG/1
TABLET, FILM COATED ORAL
Qty: 30 TAB | Refills: 1 | Status: SHIPPED | OUTPATIENT
Start: 2021-01-25 | End: 2021-04-19 | Stop reason: SDUPTHER

## 2021-02-22 DIAGNOSIS — F51.01 PRIMARY INSOMNIA: ICD-10-CM

## 2021-02-22 DIAGNOSIS — G25.81 RESTLESS LEG: ICD-10-CM

## 2021-02-22 RX ORDER — ROPINIROLE 3 MG/1
TABLET, FILM COATED ORAL
Qty: 30 TAB | Refills: 1 | Status: SHIPPED | OUTPATIENT
Start: 2021-02-22 | End: 2021-05-27 | Stop reason: ALTCHOICE

## 2021-02-22 RX ORDER — TRAZODONE HYDROCHLORIDE 100 MG/1
TABLET ORAL
Qty: 30 TAB | Refills: 1 | Status: SHIPPED | OUTPATIENT
Start: 2021-02-22 | End: 2021-07-22

## 2021-03-29 ENCOUNTER — PATIENT MESSAGE (OUTPATIENT)
Dept: FAMILY MEDICINE CLINIC | Age: 61
End: 2021-03-29

## 2021-04-19 ENCOUNTER — VIRTUAL VISIT (OUTPATIENT)
Dept: FAMILY MEDICINE CLINIC | Age: 61
End: 2021-04-19
Payer: COMMERCIAL

## 2021-04-19 DIAGNOSIS — I82.401 ACUTE DEEP VEIN THROMBOSIS (DVT) OF RIGHT LOWER EXTREMITY, UNSPECIFIED VEIN (HCC): Primary | ICD-10-CM

## 2021-04-19 DIAGNOSIS — Z12.31 ENCOUNTER FOR SCREENING MAMMOGRAM FOR MALIGNANT NEOPLASM OF BREAST: ICD-10-CM

## 2021-04-19 PROCEDURE — 99213 OFFICE O/P EST LOW 20 MIN: CPT | Performed by: FAMILY MEDICINE

## 2021-04-19 RX ORDER — DEXTROAMPHETAMINE SACCHARATE, AMPHETAMINE ASPARTATE, DEXTROAMPHETAMINE SULFATE AND AMPHETAMINE SULFATE 1.25; 1.25; 1.25; 1.25 MG/1; MG/1; MG/1; MG/1
2.5 TABLET ORAL
COMMUNITY
Start: 2020-10-16 | End: 2022-07-22

## 2021-04-19 RX ORDER — METOPROLOL TARTRATE 25 MG/1
TABLET, FILM COATED ORAL
COMMUNITY
Start: 2020-07-29 | End: 2022-07-22 | Stop reason: SDUPTHER

## 2021-04-19 NOTE — PROGRESS NOTES
Jagjit Leon is a 62 y.o. female who was seen by synchronous (real-time) audio-video technology on 4/19/2021 for Medication Refill (no new complaints.  )        Assessment & Plan:   Diagnoses and all orders for this visit:    1. Acute deep vein thrombosis (DVT) of right lower extremity, unspecified vein (HCC)  -     rivaroxaban (Xarelto) 20 mg tab tablet; TAKE 1 TABLET BY MOUTH EVERY DAY IN THE MORNING WITH BREAKFAST    2. Encounter for screening mammogram for malignant neoplasm of breast  -     [facility] MAMMO BI SCREENING INCL CAD; Future          712  Subjective:       Prior to Admission medications    Medication Sig Start Date End Date Taking? Authorizing Provider   metoprolol tartrate (LOPRESSOR) 25 mg tablet 12.5 mg = 0.5 tab each dose, PO, bid, # 30 tab, 4 Refills, Pharmacy: [facility]/pharmacy #[id number] 7/29/20  Yes Provider, Historical   dextroamphetamine-amphetamine (AdderalL) 5 mg tablet Take 2.5 mg by mouth. 10/16/20  Yes Provider, Historical   rivaroxaban (Xarelto) 20 mg tab tablet TAKE 1 TABLET BY MOUTH EVERY DAY IN THE MORNING WITH BREAKFAST 4/19/21  Yes Valerie Jose MD   traZODone (DESYREL) 100 mg tablet TAKE 1 TABLET BY MOUTH EVERY DAY AT NIGHT 2/22/21  Yes Valerie Jose MD   rOPINIRole (REQUIP) 3 mg tab tab TAKE 1 TABLET BY MOUTH EVERY DAY 2/22/21  Yes Valerie Jose MD   levothyroxine (SYNTHROID) 137 mcg tablet TAKE 1 TABLET BY MOUTH EVERY DAY BEFORE BREAKFAST 1/25/21  Yes Valerie Jose MD   sertraline (ZOLOFT) 50 mg tablet TAKE 1 TABLET BY MOUTH EVERY DAY 12/28/20  Yes Valerie Jose MD   B.infantis-B.ani-B.long-B.bifi (Probiotic 4X) 10-15 mg TbEC Take  by mouth. Yes Provider, Historical   vit B complex-C-folic ac-zinc 436 mcg- 12.5 mg tab Take  by mouth. Yes Provider, Historical   calcium citrate-vitamin d3 (CITRACAL+D) 315-200 mg-unit tab Take 1 Tab by mouth daily (with breakfast).    Yes Provider, Historical   melatonin 3 mg tablet Take  by mouth. 6mg-9 mg as needed   Yes Provider, Historical comprs. stocking,thigh,long,med (COMP. STOCKING,THIGH,LONG,MED.) misc 2 Packages by Does Not Apply route daily as needed (edema). Remove when lying down 5/17/19  Yes Adolph Teran MD   multivitamin (ONE A DAY) tablet Take 1 Tab by mouth two (2) times a day. Yes Provider, Historical   Cholecalciferol, Vitamin D3, (VITAMIN D3) 1,000 unit chew Take 2,000 Units by mouth daily. Yes Provider, Historical   Xarelto 20 mg tab tablet TAKE 1 TABLET BY MOUTH EVERY DAY IN THE MORNING WITH BREAKFAST 1/25/21 4/19/21  Estefany Thornton MD   midodrine (PROAMATINE) 5 mg tablet Take 1 Tab by mouth three (3) times daily. 10/26/20   Rena Serrano NP   phentermine (ADIPEX-P) 37.5 mg tablet Take 1 Tab by mouth every morning.  Max Daily Amount: 37.5 mg. 2/11/20 4/19/21  Estefany Thornton MD   ketoconazole (NIZORAL) 2 % shampoo Use as directed 2/11/20 4/19/21  Estefany Thornton MD     Patient Active Problem List   Diagnosis Code    Hypothyroidism E03.9    DJD (degenerative joint disease) M19.90    DVT (deep venous thrombosis) ([facility] I82.409    Morbid obesity ([facility] E66.01    HTN (hypertension) I10    FAINA (stress urinary incontinence, female) N39.3    GERD (gastroesophageal reflux disease) K21.9    CAMDEN on CPAP G47.33, Z99.89    History of pulmonary embolism Z86.711    History of DVT of lower extremity Z86.718    Bradycardia R00.1    History of gastric bypass Z98.84    Palpitations R00.2    History of hypertension Z86.79    POTS (postural orthostatic tachycardia syndrome) I49.8    Tobacco abuse counseling Z71.6    Chest pain R07.9    Weakness R53.1     Past Medical History:   Diagnosis Date    Arthritis     Chronic obstructive pulmonary disease (HCC)     hx pe    Coagulation disorder (HCC)     clotting disorder    Hx of colonic polyps     HX OTHER MEDICAL     pulmonary embolism x 2    HX OTHER MEDICAL     PCOS    HX OTHER MEDICAL     Heart monitor 1/2015     Hypertension     no longer on meds since gastric bypass    Long term (current) use of anticoagulants     Osteopenia     [facility] (p[facility] thromboembolism) ([facility] 2008    Plantar fasciitis of left foot     Tendinopathy Oct 2013    Right insertional Achilles tendinopathy    Thromboembolus ([facility]     blood clot in left leg then lung in 2007, then clot behind right knee 2009    Thromboembolus [facility])     also PE    Thyroid disease        Review of Systems   Constitutional: Negative for chills and fever. Respiratory: Negative for cough. Cardiovascular: Negative for chest pain and palpitations. Neurological: Negative. Endo/Heme/Allergies: Negative. Psychiatric/Behavioral: Negative. Objective:   No flowsheet data found. General: alert, cooperative, no distress   Mental  status: normal mood, behavior, speech, dress, motor activity, and thought processes, able to follow commands   HENT: NCAT   Neck: no visualized mass   Resp: no respiratory distress   Neuro: no gross deficits   Skin: no discoloration or lesions of concern on visible areas   Psychiatric: normal affect, consistent with stated mood, no evidence of hallucinations     Additional exam findings: We discussed the expected course, resolution and complications of the diagnosis(es) in detail. Medication risks, benefits, costs, interactions, and alternatives were discussed as indicated. I advised her to contact the office if her condition worsens, changes or fails to improve as anticipated. She expressed understanding with the diagnosis(es) and plan. Mark Hernandez, was evaluated through a synchronous (real-time) audio-video encounter. The patient (or guardian if applicable) is aware that this is a billable service. Verbal consent to proceed has been obtained within the past 12 months.  The visit was conducted pursuant to the emergency declaration under the [facility] [place] waiver authority and the [employer] Supplemental Appropriations Act. Patient identification was verified, and a caregiver was present when appropriate. The patient was located in a state where the provider was credentialed to provide care.     Juanpablo Hammonds MD

## 2021-04-19 NOTE — PROGRESS NOTES
Patient is being seen today for medication refills no other concerns. 1. Have you been to the ER, urgent care clinic since your last visit? Hospitalized since your last visit? No  2. Have you seen or consulted any other health care providers outside of the [address] [place] [address] since your last visit? Include any pap smears or colon screening. Patient has been seen by Neurology and was told she has neuropathy in her feet    Medication reconciliation has been completed with patient. Care team discussed/updated as well as pharmacy.     Health Maintenance Due   Topic Date Due    Hepatitis C Screening  Never done    COVID-19 Vaccine (1) Never done    DTaP/Tdap/Td series (1 - Tdap) Never done    PAP AKA CERVICAL CYTOLOGY  Never done    Shingrix Vaccine Age 50> (1 of 2) Never done    Pneumococcal 0-64 years (1 of 1 - PPSV23) 11/07/2019

## 2021-05-21 ENCOUNTER — HOSPITAL ENCOUNTER (OUTPATIENT)
Dept: MAMMOGRAPHY | Age: 61
Discharge: HOME | End: 2021-05-21
Attending: FAMILY MEDICINE
Payer: COMMERCIAL

## 2021-05-21 DIAGNOSIS — Z12.31 ENCOUNTER FOR SCREENING MAMMOGRAM FOR MALIGNANT NEOPLASM OF BREAST: ICD-10-CM

## 2021-05-21 PROCEDURE — 77063 BREAST TOMOSYNTHESIS BI: CPT

## 2021-05-27 ENCOUNTER — OFFICE VISIT (OUTPATIENT)
Dept: CARDIOLOGY CLINIC | Age: 61
End: 2021-05-27

## 2021-05-27 ENCOUNTER — OFFICE VISIT (OUTPATIENT)
Dept: FAMILY MEDICINE CLINIC | Age: 61
End: 2021-05-27
Payer: COMMERCIAL

## 2021-05-27 VITALS
SYSTOLIC BLOOD PRESSURE: 128 MMHG | WEIGHT: 229 LBS | RESPIRATION RATE: 14 BRPM | DIASTOLIC BLOOD PRESSURE: 68 MMHG | BODY MASS INDEX: 32.86 KG/M2 | OXYGEN SATURATION: 98 % | TEMPERATURE: 98 F | HEART RATE: 72 BPM

## 2021-05-27 VITALS
WEIGHT: 229 LBS | DIASTOLIC BLOOD PRESSURE: 79 MMHG | HEIGHT: 70 IN | SYSTOLIC BLOOD PRESSURE: 126 MMHG | BODY MASS INDEX: 32.78 KG/M2 | HEART RATE: 60 BPM

## 2021-05-27 DIAGNOSIS — Z01.810 PREOP CARDIOVASCULAR EXAM: ICD-10-CM

## 2021-05-27 DIAGNOSIS — G90.A POTS (POSTURAL ORTHOSTATIC TACHYCARDIA SYNDROME): Primary | ICD-10-CM

## 2021-05-27 DIAGNOSIS — Z86.718 HISTORY OF DVT OF LOWER EXTREMITY: ICD-10-CM

## 2021-05-27 DIAGNOSIS — Z71.6 TOBACCO ABUSE COUNSELING: ICD-10-CM

## 2021-05-27 DIAGNOSIS — F32.A ANXIETY AND DEPRESSION: ICD-10-CM

## 2021-05-27 DIAGNOSIS — Z86.711 HISTORY OF PULMONARY EMBOLISM: ICD-10-CM

## 2021-05-27 DIAGNOSIS — I10 ESSENTIAL HYPERTENSION: ICD-10-CM

## 2021-05-27 DIAGNOSIS — Z01.818 PRE-OP EXAM: Primary | ICD-10-CM

## 2021-05-27 DIAGNOSIS — Z98.84 HISTORY OF GASTRIC BYPASS: ICD-10-CM

## 2021-05-27 DIAGNOSIS — F41.9 ANXIETY AND DEPRESSION: ICD-10-CM

## 2021-05-27 DIAGNOSIS — E03.8 OTHER SPECIFIED HYPOTHYROIDISM: ICD-10-CM

## 2021-05-27 PROCEDURE — 99214 OFFICE O/P EST MOD 30 MIN: CPT | Performed by: INTERNAL MEDICINE

## 2021-05-27 PROCEDURE — 99214 OFFICE O/P EST MOD 30 MIN: CPT | Performed by: FAMILY MEDICINE

## 2021-05-27 RX ORDER — PRAMIPEXOLE DIHYDROCHLORIDE 0.5 MG/1
0.5 TABLET ORAL
COMMUNITY
End: 2021-07-23 | Stop reason: SDUPTHER

## 2021-05-27 RX ORDER — SERTRALINE HYDROCHLORIDE 50 MG/1
TABLET, FILM COATED ORAL
Qty: 30 TABLET | Refills: 3 | Status: SHIPPED | OUTPATIENT
Start: 2021-05-27 | End: 2021-10-11

## 2021-05-27 NOTE — PROGRESS NOTES
HISTORY OF PRESENT ILLNESS  Jagjit Leon is a 62 y.o. female. 4/19 history of syncope about 2014 approximately. It happened after gastric bypass surgery when she had lost a lot of weight. 4/19 history of CP lower sternal, radiates to the back. No relation to activity or food. Lasting many hours. No radiation no associated nausea vomiting diaphoresis. Feels harder to breathe during the episode. No medicines. Spontaneous relief in few hours. 7/2019 C/O substernal chest pain with exertion. She continues to have dizziness and palpitations in the evenings. She denies having syncopal episode. Dizziness  The history is provided by the patient. This is a new problem. The current episode started more than 1 week ago (yrs; see comments also). The problem occurs rarely (once in a few months). The problem has not changed (almost daily AMs) since onset. Associated symptoms include chest pain. Pertinent negatives include no headaches and no shortness of breath. Exacerbated by: driving. The symptoms are relieved by rest (pull over if driving). Palpitations   The history is provided by the patient. This is a new problem. The current episode started more than 1 week ago (few weaks, feels a hard beat but the rate is still 50-60.). Associated symptoms include chest pain and dizziness. Pertinent negatives include no fever, no malaise/fatigue, no claudication, no orthopnea, no PND, no nausea, no vomiting, no headaches, no cough and no shortness of breath. Her past medical history is significant for hypertension. Slow Heart Rate  The history is provided by the patient and medical records. This is a new (46s) problem. The current episode started more than 1 week ago. Associated symptoms include chest pain. Pertinent negatives include no headaches and no shortness of breath. Hypertension  The history is provided by the medical records. This is a chronic problem. Associated symptoms include chest pain.  Pertinent negatives include no headaches and no shortness of breath. Review of Systems   Constitutional: Negative for chills, fever, malaise/fatigue and weight loss. HENT: Negative for nosebleeds. Eyes: Negative for discharge. Respiratory: Negative for cough, shortness of breath and wheezing. Cardiovascular: Positive for chest pain and palpitations. Negative for orthopnea, claudication, leg swelling and PND. Gastrointestinal: Negative for diarrhea, nausea and vomiting. Genitourinary: Negative for dysuria and hematuria. Musculoskeletal: Negative for joint pain. Skin: Negative for rash. Neurological: Positive for dizziness. Negative for seizures, loss of consciousness and headaches. Endo/Heme/Allergies: Negative for polydipsia. Does not bruise/bleed easily. Psychiatric/Behavioral: Negative for depression and substance abuse. The patient does not have insomnia.       No Known Allergies    Past Medical History:   Diagnosis Date    Arthritis     Chronic obstructive pulmonary disease (HCC)     hx pe    Coagulation disorder (HCC)     clotting disorder    Hx of colonic polyps     HX OTHER MEDICAL     pulmonary embolism x 2    HX OTHER MEDICAL     PCOS    HX OTHER MEDICAL     Heart monitor 1/2015     Hypertension     no longer on meds since gastric bypass    Long term (current) use of anticoagulants     Osteopenia     PE (pulmonary thromboembolism) ([address] 2008    Plantar fasciitis of left foot     Tendinopathy Oct 2013    Right insertional Achilles tendinopathy    Thromboembolus ([address]     blood clot in left leg then lung in 2007, then clot behind right knee 2009    Thromboembolus ([address]     also PE    Thyroid disease        Family History   Problem Relation Age of Onset    Diabetes Other     Hypertension Other     Heart Disease Other     Arthritis-osteo Other     Stroke Mother 66    Stroke Father 36    Heart Attack Neg Hx        Social History     Tobacco Use    Smoking status: Former Smoker     Packs/day: 0.00     Years: 0.00     Pack years: 0.00     Types: Cigarettes     Quit date: 5/27/2021    Smokeless tobacco: Never Used    Tobacco comment: vapor pen   Substance Use Topics    Alcohol use: No     Alcohol/week: 3.3 standard drinks     Types: 2 Glasses of wine, 2 Standard drinks or equivalent per week     Comment: once weekly; quit 2018    Drug use: No        Current Outpatient Medications   Medication Sig    pramipexole (Mirapex) 0.5 mg tablet Take 0.5 mg by mouth. 2 tabs daily    metoprolol tartrate (LOPRESSOR) 25 mg tablet 12.5 mg = 0.5 tab each dose, PO, bid, # 30 tab, 4 Refills, Pharmacy: [facility]/pharmacy #[id number]    dextroamphetamine-amphetamine (AdderalL) 5 mg tablet Take 2.5 mg by mouth.  rivaroxaban (Xarelto) 20 mg tab tablet TAKE 1 TABLET BY MOUTH EVERY DAY IN THE MORNING WITH BREAKFAST    traZODone (DESYREL) 100 mg tablet TAKE 1 TABLET BY MOUTH EVERY DAY AT NIGHT    levothyroxine (SYNTHROID) 137 mcg tablet TAKE 1 TABLET BY MOUTH EVERY DAY BEFORE BREAKFAST    sertraline (ZOLOFT) 50 mg tablet TAKE 1 TABLET BY MOUTH EVERY DAY    midodrine (PROAMATINE) 5 mg tablet Take 1 Tab by mouth three (3) times daily.  B.infantis-B.ani-B.long-B.bifi (Probiotic 4X) 10-15 mg TbEC Take  by mouth.  vit B complex-C-folic ac-zinc 105 mcg- 12.5 mg tab Take  by mouth.  calcium citrate-vitamin d3 (CITRACAL+D) 315-200 mg-unit tab Take 1 Tab by mouth daily (with breakfast).  melatonin 3 mg tablet Take  by mouth. 6mg-9 mg as needed    comprs. stocking,thigh,long,med (COMP. STOCKING,THIGH,LONG,MED.) misc 2 Packages by Does Not Apply route daily as needed (edema). Remove when lying down    multivitamin (ONE A DAY) tablet Take 1 Tab by mouth two (2) times a day.  Cholecalciferol, Vitamin D3, (VITAMIN D3) 1,000 unit chew Take 2,000 Units by mouth daily. No current facility-administered medications for this visit.         Past Surgical History:   Procedure Laterality Date    HX DILATION AND CURETTAGE      ablation    HX DILATION AND CURETTAGE      age 17    HX GASTRIC BYPASS  1/15/2014    HX TUBAL LIGATION  1996       Visit Vitals  /79   Pulse 60   Ht 5' 10\" (1.778 m)   Wt 103.9 kg (229 lb)   LMP 12/31/2013   BMI 32.86 kg/m²     Vitals:    05/27/21 0924 05/27/21 0931   BP: (!) 135/90 126/79   Pulse: 61 60   Weight: 103.9 kg (229 lb)    Height: 5' 10\" (1.778 m)          Diagnostic Studies:  I have reviewed the relevant tests done on the patient and show as follows  EKG tracings reviewed by me today. EKG Results     None        XR Results (most recent):  Results from Hospital Encounter encounter on 12/09/13    XR CHEST PA LAT    Narrative  History: Preop    PA AND LATERAL CHEST 2 VIEWS    CPT: 64805    The heart and lungs and bony structures appear unremarkable for a patient of  this age. Impression  :    Normal chest.      No flowsheet data found. 4/19 echo  Interpretation Summary        · Normal cavity size, systolic function (ejection fraction normal) and diastolic function. Mild concentric hypertrophy. The estimated ejection fraction is 56 - 60%. No regional wall motion abnormality noted. · Normal right ventricular size and function. · Mild tricuspid valve regurgitation is present. Pulmonary arterial systolic pressure is 82.3 mmHg. There is no evidence of pulmonary hypertension. · Mildly elevated central venous pressure (5-10 mmHg); IVC diameter is less than 21 mm and collapses less than 50% with respiration. Comparison Study Information   Prior Study   There is a prior study available for comparison that was performed on 11/25/2014. As compared to the previous study, there are no significant changes. 7/19 TMT  Interpretation Summary     · Baseline ECG: Normal sinus rhythm. · Negative stress test.  · Low risk Duke treadmill score. · Appropriate heart rate and blood pressure response to exercise. · Appropriate heart rate recovery   3/15 cardiac cath   1.  No significant coronary artery disease  2. Normal left ventricular systolic function with LV EF 60%  3. Normal left ventricular end-diastolic pressure. RECOMMENDATIONS:   1. Risk factor modification including exercise. 2. Evaluation for noncardiac etiologies of chest discomfort. CT CTA CHEST PULMONARY3/25/2019  [facility]  Result Impression     1. No CT evidence of pulmonary thromboembolism. 2. Calcification in the LAD coronary artery. 5/2019 - Event monitor   No evidence of severe roberto or tachy arrhythmias. Patient markers associated with sinus rhythm    Ms. Julee Keen has a reminder for a \"due or due soon\" health maintenance. I have asked that she contact her primary care provider for follow-up on this health maintenance. Physical Exam  Constitutional:       General: She is not in acute distress. Appearance: She is well-developed. Comments: Tall stature   HENT:      Head: Normocephalic and atraumatic. Mouth/Throat:      Dentition: Normal dentition. Eyes:      General: No scleral icterus. Right eye: No discharge. Left eye: No discharge. Neck:      Thyroid: No thyromegaly. Vascular: No carotid bruit or JVD. Cardiovascular:      Rate and Rhythm: Normal rate and regular rhythm. Pulses: Intact distal pulses. Heart sounds: Normal heart sounds, S1 normal and S2 normal. No murmur heard. No friction rub. No gallop. Pulmonary:      Effort: Pulmonary effort is normal.      Breath sounds: Normal breath sounds. No wheezing or rales. Abdominal:      Palpations: Abdomen is soft. There is no mass. Tenderness: There is no abdominal tenderness. Musculoskeletal:      Cervical back: Neck supple. Lymphadenopathy:      Cervical:      Right cervical: No superficial cervical adenopathy. Left cervical: No superficial cervical adenopathy. Skin:     General: Skin is warm and dry. Findings: No rash.    Neurological:      Mental Status: She is alert and oriented to person, place, and time. Psychiatric:         Behavior: Behavior normal.         ASSESSMENT and PLAN    Patient advised to stop smoking to reduce future cardiovascular events. 11/19 Patient seems to have POTS already by orthostatic changes in the office today. As discussed with her and daughter in the office, we would not need to do tilt table for diagnosis yet. She was advised and strongly recommended to use the muscle training to increase the muscle tone and strength including some resistance exercises. Joining yoga may be good for overall muscle tone as well as balance. Plenty of water intake and continue the present medications. I will discontinue biotin as it may not be absolutely necessary. Will check vitamin D and B12 levels to rule out any significant deficiency or toxicity. 5/20 As she has significantly improved by following lifestyle changes as well as Midodrin. Unfortunately, she continues to smoke. We discussed again and advised her to stop smoking as future significant COPD may cause POTS symptoms to become even worse. She is using her compression stockings. Since the blood pressure is staying better she wanted to see if the dose of midodrine can be reduced or she can skip sometimes. I agree with that and I have given her the liberty to try to reduce the dose by half or skip the medicine sometimes and let me know next time how much she consumes. Diagnoses and all orders for this visit:    1. POTS (postural orthostatic tachycardia syndrome)    2. History of pulmonary embolism  Comments:  2007, lifelong anticoagulation    3. History of gastric bypass    4. Tobacco abuse counseling    5. History of DVT of lower extremity    6. Preop cardiovascular exam        Pertinent laboratory and test data reviewed and discussed with patient.   See patient instructions also for other medical advice given    Medications Discontinued During This Encounter   Medication Reason    rOPINIRole (REQUIP) 3 mg tab tab Alternate Therapy    midodrine (PROAMATINE) 5 mg tablet Therapy Completed       Follow-up and Dispositions    · Return in about 6 months (around 11/27/2021), or if symptoms worsen or fail to improve, for post surgery. 5/27/2021 Aure says symptomatically improved well. No need of midodrine anymore. Continue low-dose metoprolol. Tobacco cessation reinforced and discussed again. She says she stopped it today. Lifelong anticoagulation due to recurrent DVT/PE and now noted to have factor V Leiden mutation. Cleared from cardiac view with the understanding that patient will have mild risk for this knee replacement surgery.

## 2021-05-27 NOTE — PROGRESS NOTES
Jan Howell is a 62 y.o. female  presents for pre op clearance. She has no new complaints. No chest pains or SOB. No Known Allergies  Outpatient Medications Marked as Taking for the 5/27/21 encounter (Office Visit) with Estefany Thornton MD   Medication Sig Dispense Refill    pramipexole (Mirapex) 0.5 mg tablet Take 0.5 mg by mouth. 2 tabs daily      metoprolol tartrate (LOPRESSOR) 25 mg tablet 12.5 mg = 0.5 tab each dose, PO, bid, # 30 tab, 4 Refills, Pharmacy: [facility]/pharmacy #[id number]      dextroamphetamine-amphetamine (AdderalL) 5 mg tablet Take 2.5 mg by mouth.  rivaroxaban (Xarelto) 20 mg tab tablet TAKE 1 TABLET BY MOUTH EVERY DAY IN THE MORNING WITH BREAKFAST 30 Tab 3    traZODone (DESYREL) 100 mg tablet TAKE 1 TABLET BY MOUTH EVERY DAY AT NIGHT 30 Tab 1    levothyroxine (SYNTHROID) 137 mcg tablet TAKE 1 TABLET BY MOUTH EVERY DAY BEFORE BREAKFAST 30 Tab 2    sertraline (ZOLOFT) 50 mg tablet TAKE 1 TABLET BY MOUTH EVERY DAY 30 Tab 3    vit B complex-C-folic ac-zinc 105 mcg- 12.5 mg tab Take  by mouth.  calcium citrate-vitamin d3 (CITRACAL+D) 315-200 mg-unit tab Take 1 Tab by mouth daily (with breakfast).  melatonin 3 mg tablet Take  by mouth. 6mg-9 mg as needed      comprs. stocking,thigh,long,med (COMP. STOCKING,THIGH,LONG,MED.) misc 2 Packages by Does Not Apply route daily as needed (edema). Remove when lying down 2 Package 0    multivitamin (ONE A DAY) tablet Take 1 Tab by mouth two (2) times a day.  Cholecalciferol, Vitamin D3, (VITAMIN D3) 1,000 unit chew Take 2,000 Units by mouth daily.        Patient Active Problem List   Diagnosis Code    Hypothyroidism E03.9    DJD (degenerative joint disease) M19.90    DVT (deep venous thrombosis) ([facility]) I82.409    Morbid obesity ([facility] E66.01    HTN (hypertension) I10    FAINA (stress urinary incontinence, female) N39.3    GERD (gastroesophageal reflux disease) K21.9    CAMDEN on CPAP G47.33, Z99.89    History of pulmonary embolism Z86.711    History of DVT of lower extremity Z86.718    Bradycardia R00.1    History of gastric bypass Z98.84    Palpitations R00.2    History of hypertension Z86.79    POTS (postural orthostatic tachycardia syndrome) I49.8    Tobacco abuse counseling Z71.6    Chest pain R07.9    Weakness R53.1     Past Medical History:   Diagnosis Date    Arthritis     Chronic obstructive pulmonary disease (HCC)     hx pe    Coagulation disorder (HCC)     clotting disorder    Hx of colonic polyps     HX OTHER MEDICAL     pulmonary embolism x 2    HX OTHER MEDICAL     PCOS    HX OTHER MEDICAL     Heart monitor 1/2015     Hypertension     no longer on meds since gastric bypass    Long term (current) use of anticoagulants     Osteopenia     PE (pulmonary thromboembolism) ([address] 2008    Plantar fasciitis of left foot     Tendinopathy Oct 2013    Right insertional Achilles tendinopathy    Thromboembolus ([address]     blood clot in left leg then lung in 2007, then clot behind right knee 2009    Thromboembolus ([address]     also PE    Thyroid disease      Social History     Socioeconomic History    Marital status:      Spouse name: Not on file    Number of children: Not on file    Years of education: Not on file    Highest education level: Not on file   Tobacco Use    Smoking status: Former Smoker     Packs/day: 0.00     Years: 0.00     Pack years: 0.00     Types: Cigarettes     Quit date: 5/27/2021    Smokeless tobacco: Never Used    Tobacco comment: vapor pen   Substance and Sexual Activity    Alcohol use: No     Alcohol/week: 3.3 standard drinks     Types: 2 Glasses of wine, 2 Standard drinks or equivalent per week     Comment: once weekly; quit 2018    Drug use: No     Social Determinants of Health     Financial Resource Strain:     Difficulty of Paying Living Expenses:    Food Insecurity:     Worried About Running Out of Food in the Last Year:     Ran Out of Food in the Last Year:    Transportation Needs:     Lack of Transportation (Medical):  Lack of Transportation (Non-Medical):    Physical Activity:     Days of Exercise per Week:     Minutes of Exercise per Session:    Stress:     Feeling of Stress :    Social Connections:     Frequency of Communication with Friends and Family:     Frequency of Social Gatherings with Friends and Family:     Attends Yazidi Services:     Active Member of Clubs or Organizations:     Attends Club or Organization Meetings:     Marital Status:      Family History   Problem Relation Age of Onset    Diabetes Other     Hypertension Other     Heart Disease Other     Arthritis-osteo Other     Stroke Mother 66    Stroke Father 36    Heart Attack Neg Hx         Review of Systems   Constitutional: Negative for chills, fever, malaise/fatigue and weight loss. Eyes: Negative for blurred vision. Respiratory: Negative for cough, shortness of breath and wheezing. Cardiovascular: Negative for chest pain and palpitations. Gastrointestinal: Negative for nausea and vomiting. Musculoskeletal: Positive for joint pain. Negative for myalgias. Skin: Negative for rash. Neurological: Negative for weakness. Psychiatric/Behavioral: Negative. Vitals:    05/27/21 1308   BP: 128/68   Pulse: 72   Resp: 14   Temp: 98 °F (36.7 °C)   TempSrc: Oral   SpO2: 98%   Weight: 229 lb (103.9 kg)   PainSc:   3   PainLoc: Knee   LMP: 12/31/2013       Physical Exam  Vitals and nursing note reviewed. Constitutional:       Appearance: Normal appearance. She is obese. HENT:      Nose: Nose normal.   Cardiovascular:      Rate and Rhythm: Normal rate and regular rhythm. Pulses: Normal pulses. Heart sounds: Normal heart sounds. No murmur heard. Pulmonary:      Effort: Pulmonary effort is normal.      Breath sounds: Normal breath sounds. Musculoskeletal:         General: Normal range of motion. Cervical back: Normal range of motion and neck supple.    Skin: General: Skin is warm and dry. Capillary Refill: Capillary refill takes less than 2 seconds. Neurological:      General: No focal deficit present. Mental Status: She is alert and oriented to person, place, and time. Psychiatric:         Mood and Affect: Mood and affect normal.         Behavior: Behavior normal.         Thought Content: Thought content normal.         Cognition and Memory: Memory normal.         Judgment: Judgment normal.         Assessment/Plan      ICD-10-CM ICD-9-CM    1. Pre-op exam  Z01.818 V72.84    2. Essential hypertension  I10 401.9    3. Other specified hypothyroidism  E03.8 244.8    4. Anxiety and depression  F41.9 300.00 sertraline (ZOLOFT) 50 mg tablet    F32.9 311      Labs CXR and EKG reviewed. Patient is cleared for knee surgery      I have discussed the diagnosis with the patient and the intended plan of care as seen in the above orders. The patient has received an after-visit summary and questions were answered concerning future plans. I have discussed medication, side effects, and warnings with the patient in detail. The patient verbalized understanding and is in agreement with the plan of care. The patient will contact the office with any additional concerns.       lab results and schedule of future lab studies reviewed with patient    Carlos Huynh MD

## 2021-05-27 NOTE — PATIENT INSTRUCTIONS
Medications Discontinued During This Encounter   Medication Reason    rOPINIRole (REQUIP) 3 mg tab tab Alternate Therapy    midodrine (PROAMATINE) 5 mg tablet Therapy Completed          Learning About Benefits From Quitting Smoking  How does quitting smoking make you healthier? If you're thinking about quitting smoking, you may have a few reasons to be smoke-free. Your health may be one of them. · When you quit smoking, you lower your risks for cancer, lung disease, heart attack, stroke, blood vessel disease, and blindness from macular degeneration. · When you're smoke-free, you get sick less often, and you heal faster. You are less likely to get colds, flu, bronchitis, and pneumonia. · As a nonsmoker, you may find that your mood is better and you are less stressed. When and how will you feel healthier? Quitting has real health benefits that start from day 1 of being smoke-free. And the longer you stay smoke-free, the healthier you get and the better you feel. The first hours  · After just 20 minutes, your blood pressure and heart rate go down. That means there's less stress on your heart and blood vessels. · Within 12 hours, the level of carbon monoxide in your blood drops back to normal. That makes room for more oxygen. With more oxygen in your body, you may notice that you have more energy than when you smoked. After 2 weeks  · Your lungs start to work better. · Your risk of heart attack starts to drop. After 1 month  · When your lungs are clear, you cough less and breathe deeper, so it's easier to be active. · Your sense of taste and smell return. That means you can enjoy food more than you have since you started smoking. Over the years  · Over the years, your risks of heart disease, heart attack, and stroke are lower. · After 10 years, your risk of dying from lung cancer is cut by about half. And your risk for many other types of cancer is lower too.   How would quitting help others in your life?  When you quit smoking, you improve the health of everyone who now breathes in your smoke. · Their heart, lung, and cancer risks drop, much like yours. · They are sick less. For babies and small children, living smoke-free means they're less likely to have ear infections, pneumonia, and bronchitis. · If you're a woman who is or will be pregnant someday, quitting smoking means a healthier . · Children who are close to you are less likely to become adult smokers. Where can you learn more? Go to http://www.gray.com/  Enter O319 in the search box to learn more about \"Learning About Benefits From Quitting Smoking. \"  Current as of: 2020               Content Version: 12.8   [employer]. Care instructions adapted under license by [employer] (which disclaims liability or warranty for this information). If you have questions about a medical condition or this instruction, always ask your healthcare professional. [place] [address] any warranty or liability for your use of this information.

## 2021-05-27 NOTE — PROGRESS NOTES
Patient here for pre-op exam for total right knee replacement. She says she had pre-op testing done at [facility]. 1. Have you been to the ER, urgent care clinic since your last visit? Hospitalized since your last visit? No  2. Have you seen or consulted any other health care providers outside of the [address] [place] [address] since your last visit? Include any pap smears or colon screening. No    Medication reconciliation has been completed with patient. Care team discussed/updated as well as pharmacy.     Health Maintenance Due   Topic Date Due    Hepatitis C Screening  Never done    COVID-19 Vaccine (1) Never done    DTaP/Tdap/Td series (1 - Tdap) Never done    PAP AKA CERVICAL CYTOLOGY  Never done    Shingrix Vaccine Age 50> (1 of 2) Never done

## 2021-05-27 NOTE — LETTER
2021 Dr. Teri Ellsworth [address] [place], [address] Dear Dr. Jaqui Barajas: 
 
Re: Medardo Johnson : 1960 Ms. Nicole Stinson is cleared from a cardiac standpoint with mild risk for knee surgery scheduled on . If you have any questions or any further assistance is needed please contact our office. Sincerely, Ric Olivas M.D. 
 
cc:  Staci Maurice MD

## 2021-05-27 NOTE — PATIENT INSTRUCTIONS
Learning About How to Prepare for Surgery  How can you prepare before surgery? You can do some things that will help you safely prepare for surgery. · Understand exactly what surgery is planned. You should know the risks, benefits, and other options. · Tell your doctors ALL the medicines, vitamins, supplements, and herbal remedies you take. Some of these can increase the risk of bleeding. Or they may interact with anesthesia. · Follow your doctor's instructions about which medicines to take or stop before your surgery. ? You may need to stop taking some medicines a week or more before surgery. ? If you take aspirin or some other blood thinner, be sure to talk to your doctor. · Follow any other instructions your doctor gave you. · If you have an advance directive, let your doctor know, and bring a copy to the hospital.   It may include a living will and a durable power of  for health care. It lets your doctor and loved ones know your health care wishes. If you don't have one, you may want to prepare one. How can you prepare on the day of surgery? Here are some tips about what to do at home before you leave for your surgery. · If your doctor told you to take your medicines on the day of surgery, take them with only a sip of water. · Follow the instructions about when to stop eating and drinking. If you don't, your surgery may be canceled. · Follow your doctor's instructions about when to bathe or shower before your surgery. · Do not shave the surgical site yourself. · Take off all jewelry and piercings. · Take out contact lenses, if you wear them. · Have a picture ID ready to take with you. Your ID will be checked before your surgery. · Know when to call your doctor. Call your doctor if you:  ? Become ill before surgery. ? Need to reschedule. ? Have changed your mind about having the surgery. What happens before surgery?   Here are some things you can expect to happen before your surgery. · Your picture ID will be checked. · The area of your body that needs surgery is often marked to make sure there are no errors. · You will be kept comfortable and safe by your anesthesia provider. The anesthesia may make you sleep. Or it may just numb the area being worked on. What happens when you are ready to go home? Be sure you have someone drive you home. Anesthesia and pain medicine make it unsafe for you to drive. You will get instructions about recovering from your surgery. This is called a discharge plan. It will cover things like diet, wound care, follow-up care, driving, and getting back to your normal routine. Follow-up care is a key part of your treatment and safety. Be sure to make and go to all appointments, and call your doctor if you are having problems. It's also a good idea to know your test results and keep a list of the medicines you take. Where can you learn more? Go to http://www.gray.com/  Enter Q270 in the search box to learn more about \"Learning About How to Prepare for Surgery. \"  Current as of: May 27, 2020               Content Version: 12.8  © 8001-5103 Healthwise, Incorporated. Care instructions adapted under license by [employer] (which disclaims liability or warranty for this information). If you have questions about a medical condition or this instruction, always ask your healthcare professional. [address] any warranty or liability for your use of this information.

## 2021-05-27 NOTE — PROGRESS NOTES
1. Have you been to the ER, urgent care clinic since your last visit? Hospitalized since your last visit?     no  2. Have you seen or consulted any other health care providers outside of the [address] [place] [address] since your last visit? Include any pap smears or colon screening. No     3. Since your last visit, have you had any of the following symptoms?      palpitations and dizziness. 4.  Have you had any blood work, X-rays or cardiac testing? Yes Where: [facility]         5. Where do you normally have your labs drawn? [facility]  6. Do you need any refills today?    no

## 2021-05-31 DIAGNOSIS — E03.9 HYPOTHYROIDISM, UNSPECIFIED TYPE: ICD-10-CM

## 2021-06-01 RX ORDER — LEVOTHYROXINE SODIUM 137 UG/1
TABLET ORAL
Qty: 30 TABLET | Refills: 2 | Status: SHIPPED | OUTPATIENT
Start: 2021-06-01 | End: 2021-09-06

## 2021-07-22 DIAGNOSIS — G90.A POTS (POSTURAL ORTHOSTATIC TACHYCARDIA SYNDROME): ICD-10-CM

## 2021-07-22 DIAGNOSIS — F51.01 PRIMARY INSOMNIA: ICD-10-CM

## 2021-07-22 RX ORDER — TRAZODONE HYDROCHLORIDE 100 MG/1
TABLET ORAL
Qty: 30 TABLET | Refills: 1 | Status: SHIPPED | OUTPATIENT
Start: 2021-07-22 | End: 2021-10-11

## 2021-07-22 RX ORDER — MIDODRINE HYDROCHLORIDE 5 MG/1
TABLET ORAL
Qty: 90 TABLET | Refills: 5 | Status: SHIPPED | OUTPATIENT
Start: 2021-07-22 | End: 2021-12-16

## 2021-07-23 ENCOUNTER — OFFICE VISIT (OUTPATIENT)
Dept: FAMILY MEDICINE CLINIC | Age: 61
End: 2021-07-23
Payer: COMMERCIAL

## 2021-07-23 VITALS
HEART RATE: 78 BPM | OXYGEN SATURATION: 96 % | TEMPERATURE: 97.8 F | SYSTOLIC BLOOD PRESSURE: 120 MMHG | BODY MASS INDEX: 32 KG/M2 | WEIGHT: 223 LBS | RESPIRATION RATE: 14 BRPM | DIASTOLIC BLOOD PRESSURE: 84 MMHG

## 2021-07-23 DIAGNOSIS — M25.562 CHRONIC PAIN OF BOTH KNEES: ICD-10-CM

## 2021-07-23 DIAGNOSIS — G25.81 RESTLESS LEG: ICD-10-CM

## 2021-07-23 DIAGNOSIS — M25.561 CHRONIC PAIN OF BOTH KNEES: ICD-10-CM

## 2021-07-23 DIAGNOSIS — K21.00 GASTROESOPHAGEAL REFLUX DISEASE WITH ESOPHAGITIS WITHOUT HEMORRHAGE: Primary | ICD-10-CM

## 2021-07-23 DIAGNOSIS — I10 ESSENTIAL HYPERTENSION: ICD-10-CM

## 2021-07-23 DIAGNOSIS — G89.29 CHRONIC PAIN OF BOTH KNEES: ICD-10-CM

## 2021-07-23 PROCEDURE — 99214 OFFICE O/P EST MOD 30 MIN: CPT | Performed by: FAMILY MEDICINE

## 2021-07-23 RX ORDER — PRAMIPEXOLE DIHYDROCHLORIDE 1 MG/1
1 TABLET ORAL 2 TIMES DAILY
Qty: 60 TABLET | Refills: 2 | Status: SHIPPED | OUTPATIENT
Start: 2021-07-23

## 2021-07-23 NOTE — PROGRESS NOTES
Keyon Garza is a 62 y.o. female  presents for follow up. She has chronic pain and is interested in getting CBD. She has HTN gerd and restless leg. Needs refills. No Known Allergies  Outpatient Medications Marked as Taking for the 7/23/21 encounter (Office Visit) with Neli Chaudhary MD   Medication Sig Dispense Refill    traZODone (DESYREL) 100 mg tablet TAKE 1 TABLET BY MOUTH EVERY DAY AT NIGHT 30 Tablet 1    levothyroxine (SYNTHROID) 137 mcg tablet TAKE 1 TABLET BY MOUTH EVERY DAY BEFORE BREAKFAST 30 Tablet 2    pramipexole (Mirapex) 0.5 mg tablet Take 0.5 mg by mouth. 2 tabs daily      sertraline (ZOLOFT) 50 mg tablet TAKE 1 TABLET BY MOUTH EVERY DAY 30 Tablet 3    metoprolol tartrate (LOPRESSOR) 25 mg tablet 12.5 mg = 0.5 tab each dose, PO, bid, # 30 tab, 4 Refills, Pharmacy: [facility]/pharmacy #[id number]      dextroamphetamine-amphetamine (AdderalL) 5 mg tablet Take 2.5 mg by mouth.  rivaroxaban (Xarelto) 20 mg tab tablet TAKE 1 TABLET BY MOUTH EVERY DAY IN THE MORNING WITH BREAKFAST 30 Tab 3    B.infantis-B.ani-B.long-B.bifi (Probiotic 4X) 10-15 mg TbEC Take  by mouth.  vit B complex-C-folic ac-zinc 744 mcg- 12.5 mg tab Take  by mouth.  calcium citrate-vitamin d3 (CITRACAL+D) 315-200 mg-unit tab Take 1 Tab by mouth daily (with breakfast).  melatonin 3 mg tablet Take  by mouth. 6mg-9 mg as needed      comprs. stocking,thigh,long,med (COMP. STOCKING,THIGH,LONG,MED.) misc 2 Packages by Does Not Apply route daily as needed (edema). Remove when lying down 2 Package 0    multivitamin (ONE A DAY) tablet Take 1 Tab by mouth two (2) times a day.  Cholecalciferol, Vitamin D3, (VITAMIN D3) 1,000 unit chew Take 2,000 Units by mouth daily.        Patient Active Problem List   Diagnosis Code    Hypothyroidism E03.9    DJD (degenerative joint disease) M19.90    DVT (deep venous thrombosis) (HCC) I82.409    Morbid obesity ([address] E66.01    HTN (hypertension) I10    FAINA (stress urinary incontinence, female) N39.3    GERD (gastroesophageal reflux disease) K21.9    CAMDEN on CPAP G47.33, Z99.89    History of pulmonary embolism Z86.711    History of DVT of lower extremity Z86.718    Bradycardia R00.1    History of gastric bypass Z98.84    Palpitations R00.2    History of hypertension Z86.79    POTS (postural orthostatic tachycardia syndrome) I49.8    Tobacco abuse counseling Z71.6    Chest pain R07.9    Weakness R53.1     Past Medical History:   Diagnosis Date    Arthritis     Chronic obstructive pulmonary disease (HCC)     hx pe    Coagulation disorder (HCC)     clotting disorder    Hx of colonic polyps     HX OTHER MEDICAL     pulmonary embolism x 2    HX OTHER MEDICAL     PCOS    HX OTHER MEDICAL     Heart monitor 2015     Hypertension     no longer on meds since gastric bypass    Long term (current) use of anticoagulants     Osteopenia     PE (pulmonary thromboembolism) ([address]     Plantar fasciitis of left foot     Tendinopathy Oct 2013    Right insertional Achilles tendinopathy    Thromboembolus ([address]     blood clot in left leg then lung in , then clot behind right knee     Thromboembolus ([address]     also PE    Thyroid disease      Social History     Socioeconomic History    Marital status:      Spouse name: Not on file    Number of children: Not on file    Years of education: Not on file    Highest education level: Not on file   Tobacco Use    Smoking status: Former Smoker     Packs/day: 0.00     Years: 0.00     Pack years: 0.00     Types: Cigarettes     Quit date: 2021     Years since quittin.1    Smokeless tobacco: Never Used    Tobacco comment: vapor pen   Substance and Sexual Activity    Alcohol use: No     Alcohol/week: 3.3 standard drinks     Types: 2 Glasses of wine, 2 Standard drinks or equivalent per week     Comment: once weekly; quit 2018    Drug use: No     Social Determinants of Health     Financial Resource Strain:    Yuko Sheth Difficulty of Paying Living Expenses:    Food Insecurity:     Worried About Running Out of Food in the Last Year:     [address] in the Last Year:    Transportation Needs:     Lack of Transportation (Medical):  Lack of Transportation (Non-Medical):    Physical Activity:     Days of Exercise per Week:     Minutes of Exercise per Session:    Stress:     Feeling of Stress :    Social Connections:     Frequency of Communication with Friends and Family:     Frequency of Social Gatherings with Friends and Family:     Attends Bahai Services:     Active Member of Clubs or Organizations:     Attends Club or Organization Meetings:     Marital Status:      Family History   Problem Relation Age of Onset    Diabetes Other     Hypertension Other     Heart Disease Other     Arthritis-osteo Other     Stroke Mother 66    Stroke Father 36    Heart Attack Neg Hx         Review of Systems   Constitutional: Negative for chills, fever, malaise/fatigue and weight loss. Eyes: Negative for blurred vision. Respiratory: Negative for cough, shortness of breath and wheezing. Cardiovascular: Negative for chest pain. Gastrointestinal: Positive for heartburn. Negative for nausea and vomiting. Musculoskeletal: Negative for myalgias. Skin: Negative for rash. Neurological: Negative for weakness. Psychiatric/Behavioral: Negative. Vitals:    07/23/21 0816   BP: 120/84   Pulse: 78   Resp: 14   Temp: 97.8 °F (36.6 °C)   TempSrc: Oral   SpO2: 96%   Weight: 223 lb (101.2 kg)   PainSc:   3   PainLoc: Knee   LMP: 12/31/2013       Physical Exam  Vitals and nursing note reviewed. Neck:      Thyroid: No thyromegaly. Cardiovascular:      Rate and Rhythm: Normal rate and regular rhythm. Heart sounds: Normal heart sounds. Pulmonary:      Effort: Pulmonary effort is normal.      Breath sounds: Normal breath sounds. Musculoskeletal:         General: Normal range of motion.       Cervical back: Normal range of motion and neck supple. Skin:     General: Skin is warm and dry. Neurological:      Mental Status: She is alert and oriented to person, place, and time. Psychiatric:         Mood and Affect: Mood normal.         Behavior: Behavior normal.         Thought Content: Thought content normal.         Judgment: Judgment normal.         Assessment/Plan      ICD-10-CM ICD-9-CM    1. Gastroesophageal reflux disease with esophagitis without hemorrhage  K21.00 530.81 REFERRAL TO GASTROENTEROLOGY     530.10    2. Essential hypertension  I10 401.9    3. Chronic pain of both knees  M25.561 719.46     M25.562 338.29     G89.29     4. Restless leg  G25.81 333.94 pramipexole (Mirapex) 1 mg tablet     I have discussed the diagnosis with the patient and the intended plan of care as seen in the above orders. The patient has received an after-visit summary and questions were answered concerning future plans. I have discussed medication, side effects, and warnings with the patient in detail. The patient verbalized understanding and is in agreement with the plan of care. The patient will contact the office with any additional concerns.         lab results and schedule of future lab studies reviewed with patient    Silvana Joseph MD

## 2021-07-23 NOTE — PATIENT INSTRUCTIONS
Gastroesophageal Reflux Disease (GERD): Care Instructions  Overview     Gastroesophageal reflux disease (GERD) is the backward flow of stomach acid into the esophagus. The esophagus is the tube that leads from your throat to your stomach. A one-way valve prevents the stomach acid from backing up into this tube. But when you have GERD, this valve does not close tightly enough. This can also cause pain and swelling in your esophagus. (This is called esophagitis.)  If you have mild GERD symptoms including heartburn, you may be able to control the problem with antacids or over-the-counter medicine. You can also make lifestyle changes to help reduce your symptoms. These include changing your diet and eating habits, such as not eating late at night and losing weight. Follow-up care is a key part of your treatment and safety. Be sure to make and go to all appointments, and call your doctor if you are having problems. It's also a good idea to know your test results and keep a list of the medicines you take. How can you care for yourself at home? · Take your medicines exactly as prescribed. Call your doctor if you think you are having a problem with your medicine. · Your doctor may recommend over-the-counter medicine. For mild or occasional indigestion, antacids, such as Tums, Gaviscon, Mylanta, or Maalox, may help. Your doctor also may recommend over-the-counter acid reducers, such as famotidine (Pepcid AC), cimetidine (Tagamet HB), or omeprazole (Prilosec). Read and follow all instructions on the label. If you use these medicines often, talk with your doctor. · Change your eating habits. ? It's best to eat several small meals instead of two or three large meals. ? After you eat, wait 2 to 3 hours before you lie down. ? Chocolate, mint, and alcohol can make GERD worse. ? Spicy foods, foods that have a lot of acid (like tomatoes and oranges), and coffee can make GERD symptoms worse in some people.  If your symptoms are worse after you eat a certain food, you may want to stop eating that food to see if your symptoms get better. · Do not smoke or chew tobacco. Smoking can make GERD worse. If you need help quitting, talk to your doctor about stop-smoking programs and medicines. These can increase your chances of quitting for good. · If you have GERD symptoms at night, raise the head of your bed 6 to 8 inches by putting the frame on blocks or placing a foam wedge under the head of your mattress. (Adding extra pillows does not work.)  · Do not wear tight clothing around your middle. · Lose weight if you need to. Losing just 5 to 10 pounds can help. When should you call for help? Call your doctor now or seek immediate medical care if:    · You have new or different belly pain.     · Your stools are black and tarlike or have streaks of blood. Watch closely for changes in your health, and be sure to contact your doctor if:    · Your symptoms have not improved after 2 days.     · Food seems to catch in your throat or chest.   Where can you learn more? Go to http://www.gray.com/  Enter U916 in the search box to learn more about \"Gastroesophageal Reflux Disease (GERD): Care Instructions. \"  Current as of: April 15, 2020               Content Version: 12.8  © 2006-2021 Healthwise, Incorporated. Care instructions adapted under license by [employer] (which disclaims liability or warranty for this information). If you have questions about a medical condition or this instruction, always ask your healthcare professional. [place] [address] any warranty or liability for your use of this information.

## 2021-08-06 ENCOUNTER — TELEPHONE (OUTPATIENT)
Dept: FAMILY MEDICINE CLINIC | Age: 61
End: 2021-08-06

## 2021-08-06 NOTE — TELEPHONE ENCOUNTER
Patient called to check the status of her referral. She is requesting a return call as soon as possible.  [phone]

## 2021-08-09 NOTE — TELEPHONE ENCOUNTER
Called to let pt know that her referral has been faxed to [facility] Dr. Eligio Alfredo office. Pt did not answer. Left message for patient at home number requesting return call.

## 2021-08-10 NOTE — TELEPHONE ENCOUNTER
Pt is aware that referral has been sent to dr tripp's office. Pt expressed clear understanding and had no further questions.

## 2021-08-17 NOTE — PROGRESS NOTES
Consent: Kwame Burgos, who was seen by synchronous (real-time) audio-video technology, and/or her healthcare decision maker, is aware that this patient-initiated, Telehealth encounter on 4/9/2020 is a billable service, with coverage as determined by her insurance carrier. She is aware that she may receive a bill and has provided verbal consent to proceed: Yes. Assessment & Plan:   Diagnoses and all orders for this visit:    1. Other chronic pain  -     traMADoL (ULTRAM) 50 mg tablet; Take 1 Tab by mouth every eight (8) hours as needed for Pain for up to 30 days. Max Daily Amount: 150 mg.    2. Restless leg  -     rOPINIRole (REQUIP) 2 mg tablet; Take 1 Tab by mouth nightly. 712  Subjective:   Kwame Burgos is a 63 y.o. female who was seen for Follow Up Chronic Condition (she needs pain meds refills and her requip increased. no fever chills SOB or chest pain.)      Prior to Admission medications    Medication Sig Start Date End Date Taking? Authorizing Provider   rOPINIRole (REQUIP) 2 mg tablet Take 1 Tab by mouth nightly. 4/9/20  Yes Ben Frausto MD   traMADoL Birder Mo) 50 mg tablet Take 1 Tab by mouth every eight (8) hours as needed for Pain for up to 30 days. Max Daily Amount: 150 mg. 4/9/20 5/9/20 Yes Ben Frausto MD   rivaroxaban (Xarelto) 20 mg tab tablet Take 1 Tab by mouth daily (with breakfast). 4/6/20   Ben Frausto MD   traMADoL (ULTRAM) 50 mg tablet TAKE 1 TABLET BY MOUTH EVERY 6 HOURS AS NEEDED FOR PAIN FOR UP TO 7 DAYS: MAX DAILY AMOUNT 200MG 3/23/20 4/9/20  Ben Frausto MD   traZODone (DESYREL) 100 mg tablet TAKE 1 TABLET BY MOUTH EVERY DAY EVERY NIGHT 3/5/20   Ben Frausto MD   levothyroxine (SYNTHROID) 137 mcg tablet Take 137 mcg by mouth Daily (before breakfast). 2/11/20   Ben Frausto MD   phentermine (ADIPEX-P) 37.5 mg tablet Take 1 Tab by mouth every morning.  Max Daily Amount: 37.5 mg. 2/11/20   Ben Frausto MD   ketoconazole (NIZORAL) 2 % Guy Rain  1947  68 y.o. SUBJECT JEMIMA:    Chief Complaint   Patient presents with    Depression       HPI    Jules Ramirez is a 68year old female who is in with increase in depression and anxiety as a result of a situation in her residence. She reports having a person and son who have lived with her for the last 2 years. She states \"I allowed her to stay in my home and this was suppose to be temporary until she was able to find her own place\". She states the person is ill and has been hospitalized a couple of times and is currently in the hospital. She states she is under so much stress and fears for her own health. She states she has spoken with the family of the ill person but the daughters have excuses and don't want their mother in a facility. Jules Ramirez states the son who also lives in her residence is not capable of caring for his mother. Jules Ramirez states she needs something to decrease her anxiety because the situation is increasing her anxiety and depression. She states she has gotten to the point where she does not want to have any of her family members come around. She states she is not sure where to turn for help.     Current Outpatient Medications on File Prior to Visit   Medication Sig Dispense Refill    ADVAIR DISKUS 500-50 MCG/DOSE diskus inhaler Inhale 1 puff into the lungs every 12 hours After inhalation then gargle 1 Inhaler 11    albuterol sulfate HFA (PROVENTIL HFA) 108 (90 Base) MCG/ACT inhaler Inhale 2 puffs into the lungs every 4 hours as needed for Wheezing or Shortness of Breath (cough) 1 Inhaler 2    fluticasone (FLONASE) 50 MCG/ACT nasal spray 1 spray by Each Nostril route daily 1 Bottle 1    clopidogrel (PLAVIX) 75 MG tablet Take 1 tablet by mouth daily 90 tablet 1    ipratropium-albuterol (DUONEB) 0.5-2.5 (3) MG/3ML SOLN nebulizer solution Inhale 3 mLs into the lungs 4 times daily 120 vial 1    butalbital-acetaminophen-caffeine (FIORICET, ESGIC) -40 MG per tablet Take 1 tablet by mouth 3 times daily as needed for Headaches 30 tablet 0    NASAL SALINE NA by Nasal route      albuterol (PROVENTIL) (5 MG/ML) 0.5% nebulizer solution Take 1 mL by nebulization every 6 hours as needed for Wheezing or Shortness of Breath 100 vial 1    cetirizine (ZYRTEC) 10 MG tablet Take one tablet by mouth daily. 90 tablet 0    baclofen (LIORESAL) 10 MG tablet Take 1 tablet by mouth 2 times daily 60 tablet 2    montelukast (SINGULAIR) 10 MG tablet Take 1 tablet by mouth nightly 90 tablet 1    Multiple Vitamin (MULTI-VITAMIN DAILY PO) Take 1 tablet by mouth daily      meclizine (ANTIVERT) 25 MG tablet TAKE ONE TABLET THREE TIMES A DAY AS NEEDED FOR DIZZINESS AVOID ALCOHOL/CAUSES DROWSINESS 30 tablet 0    lisinopril (PRINIVIL;ZESTRIL) 10 MG tablet Take 1 tablet by mouth daily 90 tablet 1    hydroCHLOROthiazide (HYDRODIURIL) 12.5 MG tablet Take 1 tablet by mouth every other day 90 tablet 1    simvastatin (ZOCOR) 40 MG tablet Take 1 tablet by mouth nightly 90 tablet 1    aspirin 81 MG tablet Take 1 tablet by mouth daily 90 tablet 2    diphenhydrAMINE (BENADRYL) 25 MG tablet Take 1 tablet by mouth nightly as needed for Itching 90 tablet 2    albuterol (PROVENTIL) (5 MG/ML) 0.5% nebulizer solution Take 1 mL by nebulization every 6 hours as needed for Wheezing or Shortness of Breath 100 vial 1     No current facility-administered medications on file prior to visit. Past Medical History:   Diagnosis Date    Active smoker     Active smoker     Ankle fracture, left 2006    Asthma     Chondromalacia of right knee     Dr. James Mckeon + MRI    Chronic back pain     Chronic cough 10/4/2019    Depression     has seen psychiatry in [place] 6 months    Dizziness     CT brain normal -6/18/2012    Family history of early CAD     Father - 4st MI age 50, Massive MI age 71    H/O cardiovascular stress test 8/7/2012 8/7/2012-Lexiscan-Normal perfusion. LVSF normal.EF 58%    H/O Doppler ultrasound 12/11/2019 shampoo Use as directed 2/11/20   Yary Powell MD   rOPINIRole (REQUIP) 1 mg tablet Take 1 Tab by mouth daily. 2/11/20 4/9/20  Yary Powell MD   sertraline (ZOLOFT) 50 mg tablet TAKE 1 TABLET BY MOUTH EVERY DAY 1/3/20   Yary Powell MD   calcium citrate-vitamin d3 (CITRACAL+D) 315-200 mg-unit tab Take 1 Tab by mouth daily (with breakfast). Provider, Historical   melatonin 3 mg tablet Take  by mouth. 6mg-9 mg as needed    Provider, Historical   midodrine (PROAMITINE) 5 mg tablet Take 1 Tab by mouth three (3) times daily. 11/15/19   Coby Castellon MD   comprs. stocking,thigh,long,med (COMP. STOCKING,THIGH,LONG,MED.) misc 2 Packages by Does Not Apply route daily as needed (edema). Remove when lying down 5/17/19   Coby Castellon MD   thiamine HCl (VITAMIN B-1 PO) Take  by mouth. Provider, Historical   cyanocobalamin (VITAMIN B-12) 500 mcg tablet Take 500 mcg by mouth daily. Provider, Historical   multivitamin (ONE A DAY) tablet Take 1 Tab by mouth two (2) times a day. Provider, Historical   Cholecalciferol, Vitamin D3, (VITAMIN D3) 1,000 unit chew Take 2,000 Units by mouth daily. Provider, Historical     No Known Allergies    No Known Allergies    Review of Systems   Constitutional: Negative for chills, fever, malaise/fatigue and weight loss. Eyes: Negative for blurred vision. Respiratory: Negative for shortness of breath and wheezing. Cardiovascular: Negative for chest pain. Gastrointestinal: Negative for nausea and vomiting. Musculoskeletal: Positive for myalgias. Skin: Negative for rash. Neurological: Negative for weakness. Psychiatric/Behavioral: Negative. Negative for depression, hallucinations, memory loss, substance abuse and suicidal ideas. The patient is not nervous/anxious and does not have insomnia.           Objective:     Visit Vitals  LMP 12/31/2013      General: alert, cooperative, no distress   Mental  status: normal mood, behavior, speech, dress, motor activity, and thought processes, able to follow commands   HENT: NCAT   Neck: no visualized mass   Resp: no respiratory distress   Neuro: no gross deficits   Skin: no discoloration or lesions of concern on visible areas   Psychiatric: normal affect, consistent with stated mood, no evidence of hallucinations     Additional exam findings: We discussed the expected course, resolution and complications of the diagnosis(es) in detail. Medication risks, benefits, costs, interactions, and alternatives were discussed as indicated. I advised her to contact the office if her condition worsens, changes or fails to improve as anticipated. She expressed understanding with the diagnosis(es) and plan. Yanelis George is a 63 y.o. female being evaluated by a video visit encounter for concerns as above. A caregiver was present when appropriate. Due to this being a TeleHealth encounter (During [id number] public [facility] emergency), evaluation of the following organ systems was limited: Vitals/Constitutional/EENT/Resp/CV/GI//MS/Neuro/Skin/Heme-Lymph-Imm. Pursuant to the emergency declaration under the [facility], [facility] waiver authority and the [employer] Act, this Virtual  Visit was conducted, with patient's (and/or legal guardian's) consent, to reduce the patient's risk of exposure to COVID-19 and provide necessary medical care. Services were provided through a video synchronous discussion virtually via doxy. me to substitute for in-person clinic visit. Patient and provider were located at their individual homes.         Royce La MD  Abnormal left lower extremity arterial Doppler ultrasound. The Left lower extremity arteries have a Monophasic waveform suggestive of inflow disease, The Left BARBER shows Severe peripheral arterial disease. Normal right lower extremity arterial Doppler ultrasound. Normal right lower extremity ankle brachial indices    H/O echocardiogram 8/7/2012 8/7/2012-LVSF normal. EF =>55%. impaired LV relaxation.  Herniated intervertebral disk     thoracic and lumbar    Hypertension     Mild persistent asthma without complication 52/4/3398    Normal echocardiogram 8/2012    EF=> 55%-Dr. Cristiane Cerrato    Obesity (BMI 30-39. 9)     Obstructive sleep apnea 10/4/2019    Other screening mammogram     PAD (peripheral artery disease) ([address] Angioplasty 01/06/2020     LCIA 90% INSTENT RESTENOSED TO 0% WITH PTA.     Peripheral vascular disease ([address]     Postmenopausal     Shoulder fracture, left 2008    Tobacco abuse 10/4/2019     Past Surgical History:   Procedure Laterality Date    CHOLECYSTECTOMY  1991    KNEE SURGERY  1998    right knee    TONSILLECTOMY AND ADENOIDECTOMY  1963    TUBAL LIGATION  1977    TUMOR REMOVAL  1999    parotid    TUMOR REMOVAL  1976    left thigh     Family History   Problem Relation Age of Onset    High Blood Pressure Mother     Allergies Mother    Lilyan Montane Migraines Mother     Obesity Mother     Heart Disease Father     High Blood Pressure Father     Allergies Father     Obesity Father     Diabetes Daughter     High Blood Pressure Sister     Allergies Sister     Bleeding Prob Sister    Lilyan Montane Migraines Sister     Obesity Sister     Cancer Brother     High Blood Pressure Brother     Allergies Brother     Bleeding Prob Brother     Obesity Brother     Thyroid Disease Maternal Aunt     Kidney Disease Maternal Aunt     Heart Disease Maternal Uncle     Kidney Disease Maternal Uncle     Cancer Maternal Grandmother     Glaucoma Maternal Grandmother     Diabetes Maternal Grandfather     Glaucoma Maternal Grandfather     Glaucoma Paternal Grandmother     Glaucoma Paternal Grandfather      Social History     Socioeconomic History    Marital status:      Spouse name: Not on file    Number of children: 1    Years of education: Not on file    Highest education level: Not on file   Occupational History    Occupation: Customer Service   Tobacco Use    Smoking status: Former Smoker     Packs/day: 0.25     Years: 39.00     Pack years: 9.75     Types: Cigarettes     Quit date: 2019     Years since quittin.6    Smokeless tobacco: Never Used   Substance and Sexual Activity    Alcohol use: Yes     Comment: socially    Drug use: No    Sexual activity: Not on file   Other Topics Concern    Not on file   Social History Narrative    Working now at [employer] jobs from [employer] [employer] Strain:     Difficulty of Paying Living Expenses:    Food Insecurity:     Worried About [address] in the Last Year:    [address] in the Last Year:    Transportation Needs:     Lack of Transportation (Medical):  Lack of Transportation (Non-Medical):    Physical Activity:     Days of Exercise per Week:     Minutes of Exercise per Session:    Stress:     Feeling of Stress :    Social Connections:     Frequency of Communication with Friends and Family:     Frequency of Social Gatherings with Friends and Family:     Attends Yarsani Services:     Active Member of Clubs or Organizations:     Attends Club or Organization Meetings:     Marital Status:    Intimate Partner Violence:     Fear of Current or Ex-Partner:     Emotionally Abused:     Physically Abused:     Sexually Abused:        Review of Systems   Constitutional: Negative for activity change, appetite change, chills, diaphoresis, fatigue, fever and unexpected weight change. HENT: Negative for trouble swallowing.     Respiratory: Negative for cough, chest tightness, shortness of breath and wheezing. Cardiovascular: Negative for chest pain and palpitations. Neurological: Negative for dizziness, light-headedness and headaches. Psychiatric/Behavioral: Positive for sleep disturbance. Negative for self-injury and suicidal ideas. The patient is nervous/anxious. OBJECTIVE:     /80   Pulse 62   Temp 97.2 °F (36.2 °C) (Infrared)   Ht 5' 8.5\" (1.74 m)   Wt 266 lb 9.6 oz (120.9 kg)   SpO2 95%   BMI 39.95 kg/m²     Physical Exam  Vitals reviewed. Constitutional:       General: She is not in acute distress. Appearance: Normal appearance. She is well-developed. She is obese. She is not ill-appearing or diaphoretic. HENT:      Head: Normocephalic and atraumatic. Eyes:      General: No scleral icterus. Conjunctiva/sclera: Conjunctivae normal.      Pupils: Pupils are equal, round, and reactive to light. Cardiovascular:      Rate and Rhythm: Normal rate and regular rhythm. Heart sounds: Normal heart sounds. No murmur heard. No friction rub. No gallop. Pulmonary:      Effort: Pulmonary effort is normal. No respiratory distress. Breath sounds: Normal breath sounds. No wheezing. Chest:      Chest wall: No tenderness. Abdominal:      Palpations: Abdomen is soft. Musculoskeletal:         General: Normal range of motion. Cervical back: Normal range of motion and neck supple. Skin:     General: Skin is warm and dry. Neurological:      Mental Status: She is alert and oriented to person, place, and time. Psychiatric:         Attention and Perception: Attention and perception normal.         Mood and Affect: Mood is anxious. Affect is tearful. Speech: Speech normal.         Behavior: Behavior normal. Behavior is not aggressive. Thought Content:  Thought content normal.         Cognition and Memory: Cognition normal.         Judgment: Judgment normal.         No results found for requested labs within last 30 days. LDL Calculated (mg/dL)   Date Value   10/19/2020 51       Lab Results   Component Value Date    WBC 6.6 10/19/2020    WBC 5.7 07/21/2020    WBC 6.1 07/11/2020    NEUTROABS 3.9 10/19/2020    NEUTROABS 3.1 09/25/2019    HGB 14.8 10/19/2020    HGB 14.5 07/21/2020    HGB 13.2 07/11/2020    HCT 44.6 10/19/2020    HCT 44.9 07/21/2020    HCT 41.6 07/11/2020    MCV 83.4 10/19/2020    MCV 85.1 07/21/2020    MCV 87.8 07/11/2020     10/19/2020     07/21/2020     07/11/2020    SEGSABS 3.1 07/11/2020    SEGSABS 3.5 07/10/2020    SEGSABS 6.4 01/03/2020    LYMPHSABS 2.0 10/19/2020    MONOSABS 0.5 10/19/2020    EOSABS 0.2 10/19/2020    BASOSABS 0.0 10/19/2020     Lab Results   Component Value Date    TSH 3.16 09/25/2019    TSHHS 1.769 08/11/2012     Lab Results   Component Value Date    LABALBU 4.1 07/21/2020    BILITOT 0.4 07/21/2020    AST 16 07/21/2020    ALT 8 07/21/2020    ALKPHOS 74 07/21/2020             No results found for this visit on 08/17/21. ASSESSMENT AND PLAN:     1. Sleep disturbance  - hydrOXYzine (ATARAX) 25 MG tablet; Take 1 tablet by mouth nightly  Dispense: 30 tablet; Refill: 1    2. Situational anxiety    3. Anxiety  - hydrOXYzine (ATARAX) 25 MG tablet; Take 1 tablet by mouth nightly  Dispense: 30 tablet; Refill: 1    Spoke with Care CoordinatorLupe about the situation, for guidance  Contact information for [facility] provided to patient  Medication provided for anxiety  Verbalized understanding and agreement with plan    Return in about 2 months (around 10/17/2021). Spent 35 minutes reviewing situation, patient's responses, speaking with practice Care Coordinator, and coordinating information for patient. Care discussed with patient. Patient educated on signs and symptoms of exacerbation and when to seek further medical attention. Advised to call for any problems, questions, or concerns.  Patient verbalizes understanding and agrees with plan.     Medications reviewed and reconciled. Continue current medications. Appropriate prescriptions are ordered. Risks and benefits of meds are discussed. After visit summary provided.

## 2021-09-05 DIAGNOSIS — E03.9 HYPOTHYROIDISM, UNSPECIFIED TYPE: ICD-10-CM

## 2021-09-06 RX ORDER — LEVOTHYROXINE SODIUM 137 UG/1
TABLET ORAL
Qty: 30 TABLET | Refills: 2 | Status: SHIPPED | OUTPATIENT
Start: 2021-09-06 | End: 2021-12-21

## 2021-10-09 DIAGNOSIS — F41.9 ANXIETY AND DEPRESSION: ICD-10-CM

## 2021-10-09 DIAGNOSIS — F32.A ANXIETY AND DEPRESSION: ICD-10-CM

## 2021-10-09 DIAGNOSIS — F51.01 PRIMARY INSOMNIA: ICD-10-CM

## 2021-10-11 RX ORDER — SERTRALINE HYDROCHLORIDE 50 MG/1
TABLET, FILM COATED ORAL
Qty: 30 TABLET | Refills: 3 | Status: SHIPPED | OUTPATIENT
Start: 2021-10-11 | End: 2022-02-15

## 2021-10-11 RX ORDER — TRAZODONE HYDROCHLORIDE 100 MG/1
TABLET ORAL
Qty: 30 TABLET | Refills: 1 | Status: SHIPPED | OUTPATIENT
Start: 2021-10-11 | End: 2021-12-21

## 2021-11-01 ENCOUNTER — DOCUMENTATION ONLY (OUTPATIENT)
Dept: FAMILY MEDICINE CLINIC | Age: 61
End: 2021-11-01

## 2021-11-15 ENCOUNTER — OFFICE VISIT (OUTPATIENT)
Dept: FAMILY MEDICINE CLINIC | Age: 61
End: 2021-11-15
Payer: COMMERCIAL

## 2021-11-15 VITALS
WEIGHT: 223 LBS | HEART RATE: 71 BPM | RESPIRATION RATE: 10 BRPM | BODY MASS INDEX: 32 KG/M2 | OXYGEN SATURATION: 99 % | DIASTOLIC BLOOD PRESSURE: 78 MMHG | TEMPERATURE: 97.6 F | SYSTOLIC BLOOD PRESSURE: 126 MMHG

## 2021-11-15 DIAGNOSIS — F41.9 ANXIETY AND DEPRESSION: ICD-10-CM

## 2021-11-15 DIAGNOSIS — F32.A ANXIETY AND DEPRESSION: ICD-10-CM

## 2021-11-15 DIAGNOSIS — E03.9 HYPOTHYROIDISM, UNSPECIFIED TYPE: ICD-10-CM

## 2021-11-15 DIAGNOSIS — Z01.818 PREOP EXAMINATION: Primary | ICD-10-CM

## 2021-11-15 PROCEDURE — 99213 OFFICE O/P EST LOW 20 MIN: CPT | Performed by: FAMILY MEDICINE

## 2021-11-15 NOTE — PROGRESS NOTES
Chief Complaint   Patient presents with    Pre-op Exam     Pt in office for preop exam for endoscopy.

## 2021-11-15 NOTE — PATIENT INSTRUCTIONS
Upper GI Endoscopy: Before Your Procedure  What is an upper GI endoscopy? An upper gastrointestinal (or GI) endoscopy is a test that allows your doctor to look at the inside of your esophagus, stomach, and the first part of your small intestine, called the duodenum. The esophagus is the tube that carries food to your stomach. The doctor uses a thin, lighted tube that bends. It is called an endoscope, or scope. The doctor puts the tip of the scope in your mouth and gently moves it down your throat. The scope is a flexible video camera. The doctor looks at a monitor (like a TV set or a computer screen) as he or she moves the scope. A doctor may do this procedure to look for ulcers, tumors, infection, or bleeding. It also can be used to look for signs of acid backing up into your esophagus. This is called gastroesophageal reflux disease, or GERD. The doctor can use the scope to take a sample of tissue for study (a biopsy). The doctor also can use the scope to take out growths or stop bleeding. Follow-up care is a key part of your treatment and safety. Be sure to make and go to all appointments, and call your doctor if you are having problems. It's also a good idea to know your test results and keep a list of the medicines you take. How do you prepare for the procedure? Procedures can be stressful. This information will help you understand what you can expect. And it will help you safely prepare for your procedure. Preparing for the procedure    · Do not eat or drink anything for 6 to 8 hours before the test. An empty stomach helps your doctor see your stomach clearly during the test. It also reduces your chances of vomiting. If you vomit, there is a small risk that the vomit could enter your lungs.  (This is called aspiration.) If the test is done in an emergency, a tube may be inserted through your nose or mouth to empty your stomach.     · Do not take sucralfate (Carafate) or antacids on the day of the test. These medicines can make it hard for your doctor to see your upper GI tract.     · If your doctor tells you to, stop taking iron supplements 7 to 14 days before the test.     · Be sure you have someone to take you home. Anesthesia and pain medicine will make it unsafe for you to drive or get home on your own.     · Understand exactly what procedure is planned, along with the risks, benefits, and other options. · Tell your doctor ALL the medicines, vitamins, supplements, and herbal remedies you take. Some may increase the risk of problems during your procedure. Your doctor will tell you if you should stop taking any of them before the procedure and how soon to do it.     · If you take aspirin or some other blood thinner, ask your doctor if you should stop taking it before your procedure. Make sure that you understand exactly what your doctor wants you to do. These medicines increase the risk of bleeding.     · Make sure your doctor and the hospital have a copy of your advance directive. If you don't have one, you may want to prepare one. It lets others know your health care wishes. It's a good thing to have before any type of surgery or procedure. What happens on the day of the procedure? · Follow the instructions exactly about when to stop eating and drinking. If you don't, your procedure may be canceled. If your doctor told you to take your medicines on the day of the procedure, take them with only a sip of water.     · Take a bath or shower before you come in for your procedure. Do not apply lotions, perfumes, deodorants, or nail polish.     · Take off all jewelry and piercings. And take out contact lenses, if you wear them. At the hospital or surgery center   · Bring a picture ID.     · The test may take 15 to 30 minutes.     · The doctor may spray medicine on the back of your throat to numb it. You also will get medicine to prevent pain and to relax you.     · You will lie on your left side.  The doctor will put the scope in your mouth and toward the back of your throat. The doctor will tell you when to swallow. This helps the scope move down your throat. You will be able to breathe normally. The doctor will move the scope down your esophagus into your stomach. The doctor also may look at the duodenum.     · If your doctor wants to take a sample of tissue for a biopsy, he or she may use small surgical tools, which are put into the scope, to cut off some tissue. You will not feel a biopsy, if one is taken. The doctor also can use the tools to stop bleeding or to do other treatments, if needed.     · You will stay at the hospital or surgery center for 1 to 2 hours until the medicine you were given wears off. What happens after an upper GI endoscopy? · After the test, you may belch and feel bloated for a while.     · You may have a tickling, dry throat or mouth. You may feel a bit hoarse, and you may have a mild sore throat. These symptoms may last several days. Throat lozenges and warm saltwater gargles can help relieve the throat symptoms.     · Don't drive or operate machinery for 12 hours after the test.     · Your doctor will tell you when you can go back to your usual diet and activities.     · Don't drink alcohol for 12 to 24 hours after the test.   When should you call your doctor? · You have questions or concerns.     · You don't understand how to prepare for your procedure.     · You become ill before the procedure (such as fever, flu, or a cold).     · You need to reschedule or have changed your mind about having the procedure. Where can you learn more? Go to http://www.gray.com/  Enter P790 in the search box to learn more about \"Upper GI Endoscopy: Before Your Procedure. \"  Current as of: February 10, 2021               Content Version: 13.0  © 9036-3611 [employer].    Care instructions adapted under license by [employer] (which disclaims liability or warranty for this information). If you have questions about a medical condition or this instruction, always ask your healthcare professional. [place] [address] any warranty or liability for your use of this information.

## 2021-11-15 NOTE — PROGRESS NOTES
Ingrid Ordonez is a 62 y.o. female  presents for pre op for endoscopy. No chest pains or SOB weakness or numbness.     No Known Allergies    Patient Active Problem List   Diagnosis Code    Hypothyroidism E03.9    DJD (degenerative joint disease) M19.90    DVT (deep venous thrombosis) ([facility]) I82.409    Morbid obesity ([facility] [address] E66.01    HTN (hypertension) I10    FAINA (stress urinary incontinence, female) N39.3    GERD (gastroesophageal reflux disease) K21.9    CAMDEN on CPAP G47.33, Z99.89    History of pulmonary embolism Z86.711    History of DVT of lower extremity Z86.718    Bradycardia R00.1    History of gastric bypass Z98.84    Palpitations R00.2    History of hypertension Z86.79    POTS (postural orthostatic tachycardia syndrome) I49.8    Tobacco abuse counseling Z71.6    Chest pain R07.9    Weakness R53.1     Past Medical History:   Diagnosis Date    Arthritis     Chronic obstructive pulmonary disease (HCC)     hx pe    Coagulation disorder ([facility])     clotting disorder    Hx of colonic polyps     HX OTHER MEDICAL     pulmonary embolism x 2    HX OTHER MEDICAL     PCOS    HX OTHER MEDICAL     Heart monitor 2015     Hypertension     no longer on meds since gastric bypass    Long term (current) use of anticoagulants     Osteopenia     PE (pulmonary thromboembolism) ([facility] [address]     Plantar fasciitis of left foot     Tendinopathy Oct 2013    Right insertional Achilles tendinopathy    Thromboembolus ([facility] [address]     blood clot in left leg then lung in , then clot behind right knee 2009    Thromboembolus [facility])     also PE    Thyroid disease      Social History     Socioeconomic History    Marital status:    Tobacco Use    Smoking status: Former Smoker     Packs/day: 0.00     Years: 0.00     Pack years: 0.00     Types: Cigarettes     Quit date: 2021     Years since quittin.4    Smokeless tobacco: Never Used    Tobacco comment: vapor pen   Substance and Sexual Activity    Alcohol use: No     Alcohol/week: 3.3 standard drinks     Types: 2 Glasses of wine, 2 Standard drinks or equivalent per week     Comment: once weekly; quit 2018    Drug use: No     Family History   Problem Relation Age of Onset    Diabetes Other     Hypertension Other     Heart Disease Other     Arthritis-osteo Other     Stroke Mother 66    Stroke Father 36    Heart Attack Neg Hx         Review of Systems   Constitutional: Negative for chills, fever, malaise/fatigue and weight loss. Eyes: Negative for blurred vision. Respiratory: Negative for cough, shortness of breath and wheezing. Cardiovascular: Negative for chest pain. Gastrointestinal: Positive for heartburn. Negative for nausea and vomiting. Musculoskeletal: Negative for myalgias. Skin: Negative for rash. Neurological: Negative for weakness. Psychiatric/Behavioral: Negative. Vitals:    11/15/21 1608   BP: 126/78   Pulse: 71   Resp: 10   Temp: 97.6 °F (36.4 °C)   SpO2: 99%   Weight: 223 lb (101.2 kg)   PainSc:   0 - No pain   LMP: 12/31/2013       Physical Exam  Vitals and nursing note reviewed. Constitutional:       Appearance: Normal appearance. She is obese. Neck:      Thyroid: No thyromegaly. Cardiovascular:      Rate and Rhythm: Normal rate and regular rhythm. Pulses: Normal pulses. Heart sounds: Normal heart sounds. Pulmonary:      Effort: Pulmonary effort is normal.      Breath sounds: Normal breath sounds. Abdominal:      General: Abdomen is flat. Bowel sounds are normal.      Palpations: Abdomen is soft. Musculoskeletal:         General: Normal range of motion. Cervical back: Normal range of motion and neck supple. Skin:     General: Skin is warm and dry. Neurological:      Mental Status: She is alert and oriented to person, place, and time. Psychiatric:         Mood and Affect: Mood normal.         Behavior: Behavior normal.         Thought Content:  Thought content normal. Judgment: Judgment normal.         Assessment/Plan      ICD-10-CM ICD-9-CM    1. Preop examination  Z01.818 V72.84    2. Hypothyroidism, unspecified type  E03.9 244.9    3. Anxiety and depression  F41.9 300.00     F32. A 311      patient is cleared for endoscopy    I have discussed the diagnosis with the patient and the intended plan of care as seen in the above orders. The patient has received an after-visit summary and questions were answered concerning future plans. I have discussed medication, side effects, and warnings with the patient in detail. The patient verbalized understanding and is in agreement with the plan of care. The patient will contact the office with any additional concerns.       lab results and schedule of future lab studies reviewed with patient    Oscar Phillip MD

## 2021-12-16 ENCOUNTER — VIRTUAL VISIT (OUTPATIENT)
Dept: FAMILY MEDICINE CLINIC | Age: 61
End: 2021-12-16
Payer: COMMERCIAL

## 2021-12-16 DIAGNOSIS — L30.9 DERMATITIS: ICD-10-CM

## 2021-12-16 DIAGNOSIS — G25.81 RESTLESS LEG: Primary | ICD-10-CM

## 2021-12-16 PROCEDURE — 99213 OFFICE O/P EST LOW 20 MIN: CPT | Performed by: FAMILY MEDICINE

## 2021-12-16 RX ORDER — CYCLOBENZAPRINE HCL 10 MG
10 TABLET ORAL 2 TIMES DAILY
Qty: 40 TABLET | Refills: 2 | Status: SHIPPED | OUTPATIENT
Start: 2021-12-16 | End: 2022-02-23

## 2021-12-16 NOTE — PROGRESS NOTES
Jr Crisostomo is a 62 y.o. female who was seen by synchronous (real-time) audio-video technology on 12/16/2021 for Spasms (needs refills of muscle relaxants. no chest pains or SOB)        Assessment & Plan:   Diagnoses and all orders for this visit:    1. Restless leg  -     cyclobenzaprine (FLEXERIL) 10 mg tablet; Take 1 Tablet by mouth two (2) times a day. 2. Dermatitis  -     REFERRAL TO DERMATOLOGY          712  Subjective:       Prior to Admission medications    Medication Sig Start Date End Date Taking? Authorizing Provider   sertraline (ZOLOFT) 50 mg tablet TAKE 1 TABLET BY MOUTH EVERY DAY 10/11/21  Yes Clif Hillman MD   traZODone (DESYREL) 100 mg tablet TAKE 1 TABLET BY MOUTH EVERY DAY AT NIGHT 10/11/21  Yes Clif Hillman MD   levothyroxine (SYNTHROID) 137 mcg tablet TAKE 1 TABLET BY MOUTH EVERY DAY BEFORE BREAKFAST 9/6/21  Yes Clif Hillman MD   rivaroxaban (Xarelto) 20 mg tab tablet TAKE 1 TABLET BY MOUTH EVERY DAY IN THE MORNING WITH BREAKFAST 8/16/21  Yes Clif Hillman MD   pramipexole (Mirapex) 1 mg tablet Take 1 Tablet by mouth two (2) times a day. 2 tabs daily 7/23/21  Yes Clif Hillman MD   metoprolol tartrate (LOPRESSOR) 25 mg tablet 12.5 mg = 0.5 tab each dose, PO, bid, # 30 tab, 4 Refills, Pharmacy: [facility]/pharmacy #[id number] 7/29/20  Yes Provider, Historical   dextroamphetamine-amphetamine (AdderalL) 5 mg tablet Take 2.5 mg by mouth. 10/16/20  Yes Provider, Historical   B.infantis-B.ani-B.long-B.bifi (Probiotic 4X) 10-15 mg TbEC Take  by mouth. Yes Provider, Historical   vit B complex-C-folic ac-zinc 253 mcg- 12.5 mg tab Take  by mouth. Yes Provider, Historical   calcium citrate-vitamin d3 (CITRACAL+D) 315-200 mg-unit tab Take 1 Tab by mouth daily (with breakfast). Yes Provider, Historical   melatonin 3 mg tablet Take  by mouth. 6mg-9 mg as needed   Yes Provider, Historical   comprs. stocking,thigh,long,med (COMP. STOCKING,THIGH,LONG,MED.) misc 2 Packages by Does Not Apply route daily as needed (edema). Remove when lying down 5/17/19  Yes Jose Sheets MD   multivitamin (ONE A DAY) tablet Take 1 Tab by mouth two (2) times a day. Yes Provider, Historical   Cholecalciferol, Vitamin D3, (VITAMIN D3) 1,000 unit chew Take 2,000 Units by mouth daily.    Yes Provider, Historical   midodrine (PROAMATINE) 5 mg tablet TAKE 1 TABLET BY MOUTH THREE TIMES A DAY  Patient not taking: Reported on 7/23/2021 7/22/21 12/16/21  Myles Nova NP     Patient Active Problem List   Diagnosis Code    Hypothyroidism E03.9    DJD (degenerative joint disease) M19.90    DVT (deep venous thrombosis) ([facility] [address] I82.409    Morbid obesity ([facility] [address] E66.01    HTN (hypertension) I10    FAINA (stress urinary incontinence, female) N39.3    GERD (gastroesophageal reflux disease) K21.9    CAMDEN on CPAP G47.33, Z99.89    History of pulmonary embolism Z86.711    History of DVT of lower extremity Z86.718    Bradycardia R00.1    History of gastric bypass Z98.84    Palpitations R00.2    History of hypertension Z86.79    POTS (postural orthostatic tachycardia syndrome) I49.8    Tobacco abuse counseling Z71.6    Chest pain R07.9    Weakness R53.1     Past Medical History:   Diagnosis Date    Arthritis     Chronic obstructive pulmonary disease (HCC)     hx pe    Coagulation disorder (HCC)     clotting disorder    Hx of colonic polyps     HX OTHER MEDICAL     pulmonary embolism x 2    HX OTHER MEDICAL     PCOS    HX OTHER MEDICAL     Heart monitor 1/2015     Hypertension     no longer on meds since gastric bypass    Long term (current) use of anticoagulants     Osteopenia     PE (pulmonary thromboembolism) ([address] 2008    Plantar fasciitis of left foot     Tendinopathy Oct 2013    Right insertional Achilles tendinopathy    Thromboembolus ([facility] [address]     blood clot in left leg then lung in 2007, then clot behind right knee 2009    Thromboembolus [facility])     also PE    Thyroid disease        Review of Systems Constitutional: Negative for chills, fever, malaise/fatigue and weight loss. Eyes: Negative for blurred vision. Respiratory: Negative for cough, shortness of breath and wheezing. Cardiovascular: Negative for chest pain. Gastrointestinal: Negative for nausea and vomiting. Musculoskeletal: Negative for myalgias. Skin: Positive for itching and rash. Neurological: Negative for weakness. Objective:   No flowsheet data found. General: alert, cooperative, no distress   Mental  status: normal mood, behavior, speech, dress, motor activity, and thought processes, able to follow commands   HENT: NCAT   Neck: no visualized mass   Resp: no respiratory distress   Neuro: no gross deficits   Skin: no discoloration or lesions of concern on visible areas   Psychiatric: normal affect, consistent with stated mood, no evidence of hallucinations     Additional exam findings: We discussed the expected course, resolution and complications of the diagnosis(es) in detail. Medication risks, benefits, costs, interactions, and alternatives were discussed as indicated. I advised her to contact the office if her condition worsens, changes or fails to improve as anticipated. She expressed understanding with the diagnosis(es) and plan. Rodolfo Bauman, was evaluated through a synchronous (real-time) audio-video encounter. The patient (or guardian if applicable) is aware that this is a billable service. Verbal consent to proceed has been obtained within the past 12 months. The visit was conducted pursuant to the emergency declaration under the [address] [place] [address] waiver authority and the [employer] General Act. Patient identification was verified, and a caregiver was present when appropriate. The patient was located in a state where the provider was credentialed to provide care.     Luis Carlos Ernandez MD

## 2021-12-16 NOTE — PROGRESS NOTES
Patient being seen today via VV with c/o muscle spasms that began after her right knee replacement in July. Says her spasms have just gotten worse over the months. She is asking for a referral to be placed for Rheumatology as well as a referral to [facility]. 1. \"Have you been to the ER, urgent care clinic since your last visit? Hospitalized since your last visit? \" No    2. \"Have you seen or consulted any other health care providers outside of the [address] [place] [address] since your last visit? \" No     3. For patients aged 39-70: Has the patient had a colonoscopy / FIT/ Cologuard? Yes, HM satisfied with blue hyperlink     If the patient is female:    4. For patients aged 41-77: Has the patient had a mammogram within the past 2 years? Yes, HM satisfied with blue hyperlink    5. For patients aged 21-65: Has the patient had a pap smear?  No

## 2021-12-20 DIAGNOSIS — E03.9 HYPOTHYROIDISM, UNSPECIFIED TYPE: ICD-10-CM

## 2021-12-20 DIAGNOSIS — F51.01 PRIMARY INSOMNIA: ICD-10-CM

## 2021-12-21 RX ORDER — TRAZODONE HYDROCHLORIDE 100 MG/1
TABLET ORAL
Qty: 30 TABLET | Refills: 1 | Status: SHIPPED | OUTPATIENT
Start: 2021-12-21 | End: 2022-02-15

## 2021-12-21 RX ORDER — LEVOTHYROXINE SODIUM 137 UG/1
TABLET ORAL
Qty: 30 TABLET | Refills: 2 | Status: SHIPPED | OUTPATIENT
Start: 2021-12-21 | End: 2022-03-07

## 2022-01-20 DIAGNOSIS — I82.401 ACUTE DEEP VEIN THROMBOSIS (DVT) OF RIGHT LOWER EXTREMITY, UNSPECIFIED VEIN (HCC): ICD-10-CM

## 2022-02-15 DIAGNOSIS — G90.A POTS (POSTURAL ORTHOSTATIC TACHYCARDIA SYNDROME): ICD-10-CM

## 2022-02-15 DIAGNOSIS — F32.A ANXIETY AND DEPRESSION: ICD-10-CM

## 2022-02-15 DIAGNOSIS — F41.9 ANXIETY AND DEPRESSION: ICD-10-CM

## 2022-02-15 DIAGNOSIS — F51.01 PRIMARY INSOMNIA: ICD-10-CM

## 2022-02-15 RX ORDER — TRAZODONE HYDROCHLORIDE 100 MG/1
TABLET ORAL
Qty: 30 TABLET | Refills: 1 | Status: SHIPPED | OUTPATIENT
Start: 2022-02-15 | End: 2022-03-15 | Stop reason: SDUPTHER

## 2022-02-15 RX ORDER — SERTRALINE HYDROCHLORIDE 50 MG/1
TABLET, FILM COATED ORAL
Qty: 30 TABLET | Refills: 3 | Status: SHIPPED | OUTPATIENT
Start: 2022-02-15 | End: 2022-03-15 | Stop reason: SDUPTHER

## 2022-02-17 ENCOUNTER — TELEPHONE (OUTPATIENT)
Dept: FAMILY MEDICINE CLINIC | Age: 62
End: 2022-02-17

## 2022-02-17 RX ORDER — MIDODRINE HYDROCHLORIDE 5 MG/1
TABLET ORAL
Qty: 90 TABLET | Refills: 5 | Status: SHIPPED | OUTPATIENT
Start: 2022-02-17 | End: 2022-06-28 | Stop reason: SDUPTHER

## 2022-02-17 NOTE — TELEPHONE ENCOUNTER
April with [facility] called and states patient is requesting to go there for her Physical; Therapy and asking if we can fax over there. I do not see a referral for PT in her chart at all. If Dr. Essie Hilton is referring for PT a referral needs to be placed and faxed to [facility] at [phone]. If there are any questions April can be reached at [phone].

## 2022-02-21 DIAGNOSIS — G25.81 RESTLESS LEG: ICD-10-CM

## 2022-02-23 RX ORDER — CYCLOBENZAPRINE HCL 10 MG
TABLET ORAL
Qty: 40 TABLET | Refills: 2 | Status: SHIPPED | OUTPATIENT
Start: 2022-02-23 | End: 2022-03-21 | Stop reason: SDUPTHER

## 2022-02-24 NOTE — TELEPHONE ENCOUNTER
Dr Etelvina Diehl, I do not see a referral for PT in here. Do you know what she needed it for? I'm wondering if it's for the knee pain?

## 2022-03-06 DIAGNOSIS — E03.9 HYPOTHYROIDISM, UNSPECIFIED TYPE: ICD-10-CM

## 2022-03-07 RX ORDER — LEVOTHYROXINE SODIUM 137 UG/1
TABLET ORAL
Qty: 30 TABLET | Refills: 2 | Status: SHIPPED | OUTPATIENT
Start: 2022-03-07 | End: 2022-06-06

## 2022-03-15 DIAGNOSIS — F41.9 ANXIETY AND DEPRESSION: ICD-10-CM

## 2022-03-15 DIAGNOSIS — F32.A ANXIETY AND DEPRESSION: ICD-10-CM

## 2022-03-15 DIAGNOSIS — F51.01 PRIMARY INSOMNIA: ICD-10-CM

## 2022-03-15 NOTE — TELEPHONE ENCOUNTER
This patient contacted office for the following prescriptions to be filled:    Last office visit: 12/16/21  Follow up appointment: no upcoming visits (if overdue call patient to schedule appointment)  Medication requested :   Requested Prescriptions     Pending Prescriptions Disp Refills    traZODone (DESYREL) 100 mg tablet 30 Tablet 1     Sig: Take 1 Tablet by mouth nightly.  sertraline (ZOLOFT) 50 mg tablet 30 Tablet 3     Sig: Take 1 Tablet by mouth daily.        PCP: Jenniefr Cyr  Mail order or Local pharmacy name [facility]

## 2022-03-16 RX ORDER — TRAZODONE HYDROCHLORIDE 100 MG/1
100 TABLET ORAL
Qty: 30 TABLET | Refills: 1 | Status: SHIPPED | OUTPATIENT
Start: 2022-03-16 | End: 2022-03-22 | Stop reason: SDUPTHER

## 2022-03-16 RX ORDER — SERTRALINE HYDROCHLORIDE 50 MG/1
50 TABLET, FILM COATED ORAL DAILY
Qty: 30 TABLET | Refills: 3 | Status: SHIPPED | OUTPATIENT
Start: 2022-03-16 | End: 2022-03-22 | Stop reason: SDUPTHER

## 2022-03-17 NOTE — TELEPHONE ENCOUNTER
This pharmacy faxed over request for the following prescriptions to be filled:traZODone (DESYREL) 100 mg tablet , and sertraline (ZOLOFT) 50 mg tablet, Requesting a 90 Day Supply, on  03/16/22 both medications was submitted to patient's pharmacy for a 30 Day supply. Please review and advise.

## 2022-03-18 PROBLEM — Z71.6 TOBACCO ABUSE COUNSELING: Status: ACTIVE | Noted: 2019-08-23

## 2022-03-19 PROBLEM — Z98.84 HISTORY OF GASTRIC BYPASS: Status: ACTIVE | Noted: 2019-04-05

## 2022-03-19 PROBLEM — R00.2 PALPITATIONS: Status: ACTIVE | Noted: 2019-04-05

## 2022-03-19 PROBLEM — R53.1 WEAKNESS: Status: ACTIVE | Noted: 2019-08-23

## 2022-03-19 PROBLEM — G90.A POTS (POSTURAL ORTHOSTATIC TACHYCARDIA SYNDROME): Status: ACTIVE | Noted: 2019-08-23

## 2022-03-19 PROBLEM — Z86.711 HISTORY OF PULMONARY EMBOLISM: Status: ACTIVE | Noted: 2019-04-05

## 2022-03-19 PROBLEM — R00.1 BRADYCARDIA: Status: ACTIVE | Noted: 2019-04-05

## 2022-03-19 PROBLEM — Z86.718 HISTORY OF DVT OF LOWER EXTREMITY: Status: ACTIVE | Noted: 2019-04-05

## 2022-03-20 PROBLEM — Z86.79 HISTORY OF HYPERTENSION: Status: ACTIVE | Noted: 2019-04-05

## 2022-03-20 PROBLEM — R07.9 CHEST PAIN: Status: ACTIVE | Noted: 2019-08-23

## 2022-03-21 DIAGNOSIS — F51.01 PRIMARY INSOMNIA: ICD-10-CM

## 2022-03-21 DIAGNOSIS — F41.9 ANXIETY AND DEPRESSION: ICD-10-CM

## 2022-03-21 DIAGNOSIS — I82.401 ACUTE DEEP VEIN THROMBOSIS (DVT) OF RIGHT LOWER EXTREMITY, UNSPECIFIED VEIN (HCC): ICD-10-CM

## 2022-03-21 DIAGNOSIS — E03.9 HYPOTHYROIDISM, UNSPECIFIED TYPE: ICD-10-CM

## 2022-03-21 DIAGNOSIS — F32.A ANXIETY AND DEPRESSION: ICD-10-CM

## 2022-03-21 DIAGNOSIS — G25.81 RESTLESS LEG: ICD-10-CM

## 2022-03-22 RX ORDER — SERTRALINE HYDROCHLORIDE 50 MG/1
50 TABLET, FILM COATED ORAL DAILY
Qty: 90 TABLET | Refills: 1 | Status: SHIPPED | OUTPATIENT
Start: 2022-03-22

## 2022-03-22 RX ORDER — SERTRALINE HYDROCHLORIDE 50 MG/1
50 TABLET, FILM COATED ORAL DAILY
Qty: 30 TABLET | Refills: 3 | OUTPATIENT
Start: 2022-03-22

## 2022-03-22 RX ORDER — TRAZODONE HYDROCHLORIDE 100 MG/1
100 TABLET ORAL
Qty: 90 TABLET | Refills: 0 | Status: SHIPPED | OUTPATIENT
Start: 2022-03-22 | End: 2022-07-18

## 2022-03-22 RX ORDER — PRAMIPEXOLE DIHYDROCHLORIDE 1 MG/1
1 TABLET ORAL 2 TIMES DAILY
Qty: 60 TABLET | Refills: 2 | OUTPATIENT
Start: 2022-03-22

## 2022-03-22 RX ORDER — LEVOTHYROXINE SODIUM 137 UG/1
137 TABLET ORAL
Qty: 30 TABLET | Refills: 2 | OUTPATIENT
Start: 2022-03-22

## 2022-03-22 RX ORDER — TRAZODONE HYDROCHLORIDE 100 MG/1
100 TABLET ORAL
Qty: 30 TABLET | Refills: 1 | OUTPATIENT
Start: 2022-03-22

## 2022-03-22 RX ORDER — CYCLOBENZAPRINE HCL 10 MG
10 TABLET ORAL 2 TIMES DAILY
Qty: 40 TABLET | Refills: 1 | Status: SHIPPED | OUTPATIENT
Start: 2022-03-22 | End: 2022-05-31

## 2022-04-08 ENCOUNTER — TELEPHONE (OUTPATIENT)
Dept: FAMILY MEDICINE CLINIC | Age: 62
End: 2022-04-08

## 2022-04-08 NOTE — TELEPHONE ENCOUNTER
This patient contacted office for the following prescriptions to be filled:    Last office visit: 12/16/21  Follow up appointment: no upcoming appt (if overdue call patient to schedule appointment)  Medication requested : Lasix  PCP: Rachael Limon  Mail order or Local pharmacy [facility]        Pts feet are extremely swollen after knee replacement surgery. She states she has not been able to walk due to knee pain and swelling of the feet. Please review and advise.

## 2022-04-21 NOTE — TELEPHONE ENCOUNTER
Left message for patient at home number requesting return call.         [facility]

## 2022-04-25 ENCOUNTER — OFFICE VISIT (OUTPATIENT)
Dept: FAMILY MEDICINE CLINIC | Age: 62
End: 2022-04-25
Payer: MEDICARE

## 2022-04-25 VITALS
DIASTOLIC BLOOD PRESSURE: 68 MMHG | RESPIRATION RATE: 10 BRPM | OXYGEN SATURATION: 100 % | HEART RATE: 79 BPM | SYSTOLIC BLOOD PRESSURE: 114 MMHG | WEIGHT: 248 LBS | BODY MASS INDEX: 35.58 KG/M2

## 2022-04-25 DIAGNOSIS — G89.29 CHRONIC PAIN OF BOTH KNEES: ICD-10-CM

## 2022-04-25 DIAGNOSIS — M25.561 CHRONIC PAIN OF BOTH KNEES: ICD-10-CM

## 2022-04-25 DIAGNOSIS — M25.562 CHRONIC PAIN OF BOTH KNEES: ICD-10-CM

## 2022-04-25 DIAGNOSIS — R60.0 LOCALIZED EDEMA: Primary | ICD-10-CM

## 2022-04-25 PROCEDURE — G8432 DEP SCR NOT DOC, RNG: HCPCS | Performed by: FAMILY MEDICINE

## 2022-04-25 PROCEDURE — G8417 CALC BMI ABV UP PARAM F/U: HCPCS | Performed by: FAMILY MEDICINE

## 2022-04-25 PROCEDURE — G8754 DIAS BP LESS 90: HCPCS | Performed by: FAMILY MEDICINE

## 2022-04-25 PROCEDURE — 3017F COLORECTAL CA SCREEN DOC REV: CPT | Performed by: FAMILY MEDICINE

## 2022-04-25 PROCEDURE — G8427 DOCREV CUR MEDS BY ELIG CLIN: HCPCS | Performed by: FAMILY MEDICINE

## 2022-04-25 PROCEDURE — G9899 SCRN MAM PERF RSLTS DOC: HCPCS | Performed by: FAMILY MEDICINE

## 2022-04-25 PROCEDURE — G8752 SYS BP LESS 140: HCPCS | Performed by: FAMILY MEDICINE

## 2022-04-25 PROCEDURE — 99213 OFFICE O/P EST LOW 20 MIN: CPT | Performed by: FAMILY MEDICINE

## 2022-04-25 RX ORDER — FUROSEMIDE 40 MG/1
40 TABLET ORAL DAILY
Qty: 40 TABLET | Refills: 1 | Status: SHIPPED | OUTPATIENT
Start: 2022-04-25 | End: 2022-06-27

## 2022-04-25 NOTE — PROGRESS NOTES
Oliver Diez is a 62 y.o. female  presents for follow up on edema and dental pain.   She has dental follow up    No Known Allergies    Patient Active Problem List   Diagnosis Code    Hypothyroidism E03.9    DJD (degenerative joint disease) M19.90    DVT (deep venous thrombosis) ([facility]) I82.409    Morbid obesity ([facility] [address] E66.01    HTN (hypertension) I10    FAINA (stress urinary incontinence, female) N39.3    GERD (gastroesophageal reflux disease) K21.9    CAMDEN on CPAP G47.33, Z99.89    History of pulmonary embolism Z86.711    History of DVT of lower extremity Z86.718    Bradycardia R00.1    History of gastric bypass Z98.84    Palpitations R00.2    History of hypertension Z86.79    POTS (postural orthostatic tachycardia syndrome) I49.8    Tobacco abuse counseling Z71.6    Chest pain R07.9    Weakness R53.1     Past Medical History:   Diagnosis Date    Arthritis     Chronic obstructive pulmonary disease (HCC)     hx pe    Coagulation disorder (HCC)     clotting disorder    Hx of colonic polyps     HX OTHER MEDICAL     pulmonary embolism x 2    HX OTHER MEDICAL     PCOS    HX OTHER MEDICAL     Heart monitor 2015     Hypertension     no longer on meds since gastric bypass    Long term (current) use of anticoagulants     Osteopenia     PE (pulmonary thromboembolism) ([facility] [address] 2008    Plantar fasciitis of left foot     Tendinopathy Oct 2013    Right insertional Achilles tendinopathy    Thromboembolus ([facility] [address]     blood clot in left leg then lung in , then clot behind right knee 2009    Thromboembolus [facility])     also PE    Thyroid disease      Social History     Socioeconomic History    Marital status:    Tobacco Use    Smoking status: Former Smoker     Packs/day: 0.00     Years: 0.00     Pack years: 0.00     Types: Cigarettes     Quit date: 2021     Years since quittin.9    Smokeless tobacco: Never Used    Tobacco comment: vapor pen   Substance and Sexual Activity    Alcohol use: No     Alcohol/week: 3.3 standard drinks     Types: 2 Glasses of wine, 2 Standard drinks or equivalent per week     Comment: once weekly; quit 2018    Drug use: No     Family History   Problem Relation Age of Onset    Diabetes Other     Hypertension Other     Heart Disease Other     OSTEOARTHRITIS Other     Stroke Mother 66    Stroke Father 36    Heart Attack Neg Hx         Review of Systems   Constitutional: Negative for chills, fever, malaise/fatigue and weight loss. Eyes: Negative for blurred vision. Respiratory: Negative for cough, shortness of breath and wheezing. Cardiovascular: Positive for leg swelling. Negative for chest pain. Gastrointestinal: Negative for nausea and vomiting. Musculoskeletal: Negative for myalgias. Skin: Negative for rash. Neurological: Negative for weakness. Psychiatric/Behavioral: Negative. Vitals:    04/25/22 1501   BP: 114/68   Pulse: 79   Resp: 10   SpO2: 100%   Weight: 248 lb (112.5 kg)   LMP: 12/31/2013       Physical Exam  Constitutional:       Appearance: Normal appearance. She is obese. Cardiovascular:      Rate and Rhythm: Normal rate and regular rhythm. Pulses: Normal pulses. Heart sounds: Normal heart sounds. Musculoskeletal:      Right lower leg: Edema present. Left lower leg: Edema present. Skin:     General: Skin is warm. Capillary Refill: Capillary refill takes less than 2 seconds. Neurological:      Mental Status: She is alert and oriented to person, place, and time. Assessment/Plan      ICD-10-CM ICD-9-CM    1. Localized edema  R60.0 782.3 furosemide (LASIX) 40 mg tablet   2. Chronic pain of both knees  M25.561 719.46 REFERRAL TO PHYSICAL THERAPY    M25.562 338.29     G89.29       I have discussed the diagnosis with the patient and the intended plan of care as seen in the above orders. The patient has received an after-visit summary and questions were answered concerning future plans.  I have discussed medication, side effects, and warnings with the patient in detail. The patient verbalized understanding and is in agreement with the plan of care. The patient will contact the office with any additional concerns.         lab results and schedule of future lab studies reviewed with patient    Bea Serrato MD

## 2022-04-25 NOTE — PROGRESS NOTES
Chief Complaint   Patient presents with    Swelling    Dental Pain       Pt in office for swelling both legs. She also c/o weight gain. She has tooth pain from where she had her teeth pulled. States she has some of her bone exposed from where it's not healing. She is not able to put her dentures in d/t the swelling.

## 2022-05-02 ENCOUNTER — HOSPITAL ENCOUNTER (OUTPATIENT)
Dept: PHYSICAL THERAPY | Age: 62
Discharge: HOME | End: 2022-05-02
Attending: FAMILY MEDICINE
Payer: MEDICARE

## 2022-05-02 PROCEDURE — 97161 PT EVAL LOW COMPLEX 20 MIN: CPT

## 2022-05-02 NOTE — PROGRESS NOTES
PT DAILY TREATMENT NOTE/KNEE EVAL     Patient Name: Catarina Aguilera  Date:2022  : 1960  [x]  Patient  Verified  Payor: Juan A Shelljarrod / Plan: [employer] / Product Type: Managed Care Medicare /    In DXCZ:[id number]  Out time:1230  Total Treatment Time (min): 33  Visit #: 1 of 10    Medicare/BCBS Only   Total Timed Codes (min):  0 1:1 Treatment Time:  33       Treatment Area: Right knee pain [M25.561]      SUBJECTIVE  Pain Level (0-10 scale): 2  []constant [x]intermittent []improving []worsening []no change since onset     Any medication changes, allergies to medications, adverse drug reactions, diagnosis change, or new procedure performed?: [x] No    [] Yes (see summary sheet for update)  Subjective functional status/changes:       Subjective: Patient is a 62 y.o. female referred to PT with the above Dx. Patient seen today for c/o right knee pain and swelling since TKR in 2021. She reports continued swelling of the right knee, pain in the left knee and low back. She reports frequent muscle cramps at (B) thighs. Trouble walking at times with a feeling of the right LE giving out. Pain and clicking with walking. Hx of COVID 19 with symptom flare ups of brain fog, dizziness, confusion, fatigue and increased depression. Patient presents to PT with an impaired gait, decreased balance, decreased strength, decreased flexibility, and decreased mobility of (B) LE. She has a pelvic obliquity, (+) Right Piriformis Test, (+) Right Slump Test.  Patient s/s appear to be consistent w/ diagnosis. Patient demonstrates the potential to make functional gains within a reasonable time frame. Patient will benefit from skilled PT to address impairments and improve functional mobility, strength, gait and balance for an improved quality of life.   Fall Risk Assessment: Patient demonstrates a low Fall Risk      Mechanism of Injury: TKR, 2021    Comorbidities: Neuropathy, Depression, Thyroid Problems, HTN, Visual Impaired, POTS (Postural Orthostatic Tachycardia Syndrome, Vascular Disease   Prior Level of Function: Less swelling, redness and pain in (B) Legs    FOTO: 36 / 41 in 14 visits    Goal: Want the pain to go away and get my ROM and Strength back    Health Status: Fair    What type of work do you do? [employer] , but had to stop    Living Situation/Domestic Life: Lives at home with daughter  Mobility: (I)  Self Care (I)  Stairs in the home? Yes, Unable to do a reciprocal gait pattern due to pain    What type of daily activities/hobbies? Cleaning house,     Limitations to Activity/Recreation/PLOF: Carrying items up and down stairs, lifting, carrying items, Driving         Barriers: [x]pain []financial []time []transportation []other    Motivation: High    FABQ Score: []low []elevate    Cognition: A & O x 3    Other:    Risk For Falls:   [x]No  []low []elevate           OBJECTIVE/EXAMINATION  Demonstrated Balance: Fair                 SLS: Right 2  Left  3     Tandem Stance: NT          Romberg:  EO 30   EC 30  Demonstrated Mobility: (I)  Gait: Right toe out, right LE crossing center plane resulting in LOB, decreased right pelvic sway  - Decreased mobility (B) innominate in stork stance  - Elev right crest  - Left Trendelenburg in standing  - 25* Static Right Hip ER  - (+) Right Slump Test           AROM PROM Strength Pain? ?    Right Left Right Left Right Left    Hip Flx     4-4+ 3+    Hip Ext     4 3+    Hib ABD     4 3+    Hip ADD     3+ 3+    Hip IR     3+ 3+    Hip ER     5 5    Knee Flx 114    5 5    Knee Ext 0    5 5                    31 min [x]Eval                  []Re-Eval         12  NC min Therapeutic Exercise:  [x] See flow sheet : Develop and instruct HEP   Rationale: increase ROM, increase strength, and improve coordination to improve the patients ability to Initiate HEP and improve daily function With   [x] TE   [] TA   [] neuro   [] other: Patient Education: [x] Review HEP    [] Progressed/Changed HEP based on:   [] positioning   [] body mechanics   [] transfers   [] heat/ice application    [] other:       Other Objective/Functional Measures:      Access Code: [email or web address]  URL: [email or web address]  Date: 05/02/2022  Prepared by: Dupont [employer]    Exercises  Standing Marching - 2 x daily - 7 x weekly - 3 sets - 10 reps  Toe Touches - 2 x daily - 7 x weekly - 3 sets - 10 reps  Seated Lumbar Flexion Stretch - 2 x daily - 7 x weekly - 1 sets - 10 reps - 5 hold  Supine Bridge with Spinal Articulation - 2 x daily - 7 x weekly - 3 sets - 10 reps - hold  Prone Gluteal Sets - 2 x daily - 7 x weekly - 1 sets - 10 reps - 5 hold  Prone Hip Extension - 2 x daily - 7 x weekly - 3 sets - 10 reps  Clamshell - 2 x daily - 7 x weekly - 3 sets - 10 reps  Clamshell in Abduction - 2 x daily - 7 x weekly - 3 sets - 10 reps  Sidelying Hip Abduction - 2 x daily - 7 x weekly - 3 sets - 10 reps  Seated Piriformis Stretch - 2 x daily - 7 x weekly - 3 sets - 1 reps - 30 hold  Sciatic Nerve Glide - 2 x daily - 7 x weekly - 3 sets - 20 reps    Pain Level (0-10 scale) post treatment: 2     ASSESSMENT/Changes in Function:  - Pt demo good understanding of HEP        [x]  See Plan of Care  []  See progress note/recertification  []  See Discharge Summary         Progress towards goals / Updated goals:  Short Term Goals: To be accomplished in 5 treatments:  1. Pt will be compliant and independent with HEP in order to facilitate PT sessions and aid with self management   Eval Status:  Initiated   Current Status:  2. Pt to tolerate 30 min or more of TE and/or Interventions w/o increased s/s   Eval Status:  Initiated   Current Status:    Long Term Goals: To be accomplished in 10 treatments:  1.   Pt will report 50% improvement or better with right knee dysfunction to show a significant increase in ability to tolerate ADL   Eval Status:  Initiated   Current Status:  2. Pt will have a negative right slump test to show decreased neural tension of the right LE and progress to increased activity tolerance with little difficulty    Eval Status:  (+) Right Slump Test   Current Status:  3. Pt will demonstrate the ability to lift and carry 10# each hand up and down 8 steps with a reciprocal gait pattern and little difficulty for progress to carrying items up and down her stair with little difficulty   Eval Status:  Difficulty carrying items up and down stairs   Current Status:  4. Pt will demonstrate stair negotiation for 20 6\" steps with a reciprocal gait patter for carryover to walking her stairs at home with little to no difficulty     Eval Status:  Step to gait patter with stairs   Current Status:  5.   Pt will improve FOTO score to 41 in 14 visits to show significant improvement in function for progress to performing usual daily activity   Eval Status: FOTO = 36   Current Status:      PLAN  [x]  Upgrade activities as tolerated     [x]  Continue plan of care  []  Update interventions per flow sheet       []  Discharge due to:_  []  Other:_          Andreas Moscoso, PT 5/2/2022  11:57 AM

## 2022-05-02 NOTE — PROGRESS NOTES
[facility] at [address] [facility] [facility], [address] [place] [address]  Phone: [phone]      Fax:  [phone]    Plan of Care/ Statement of Necessity for Physical Therapy Services  Patient name: Rosario Bajwa Start of Care: 2022   Referral source: Annalisa Plummer MD : 1960    Medical Diagnosis: Right knee pain [M25.561]  Payor: Femi Leblanc / Plan: [employer] / Product Type: Managed Care Medicare /  Onset Date:2021, with recent exacerbation on or about 22    Treatment Diagnosis: Right Knee Pain   Prior Hospitalization: see medical history Provider#: [id number]   Medications: Verified on Patient summary List   Comorbidities: Neuropathy, Depression, Thyroid Problems, HTN, Visual Impaired, POTS (Postural Orthostatic Tachycardia Syndrome, Vascular Disease   Prior Level of Function: Less swelling, redness and pain in (B) Legs      The Plan of Care and following information is based on the information from the initial evaluation. Assessment/ key information: Patient is a 62 y.o. female referred to PT with the above Dx. Patient seen today for c/o right knee pain and swelling since TKR in 2021. She reports continued swelling of the right knee, pain in the left knee and low back. She reports frequent muscle cramps at (B) thighs. Trouble walking at times with a feeling of the right LE giving out. Pain and clicking with walking. Hx of COVID 19 with symptom flare ups of brain fog, dizziness, confusion, fatigue and increased depression.         Patient presents to PT with an impaired gait, decreased balance, decreased strength, decreased flexibility, and decreased mobility of (B) LE. She has a pelvic obliquity, (+) Right Piriformis Test, (+) Right Slump Test.  Patient s/s appear to be consistent w/ diagnosis. Patient demonstrates the potential to make functional gains within a reasonable time frame.   Patient will benefit from skilled PT to address impairments and improve functional mobility, strength, gait and balance for an improved quality of life. Fall Risk Assessment: Patient demonstrates a low Fall Risk       Evaluation Complexity History MEDIUM  Complexity : 1-2 comorbidities / personal factors will impact the outcome/ POC ; Examination MEDIUM Complexity : 3 Standardized tests and measures addressing body structure, function, activity limitation and / or participation in recreation  ;Presentation LOW Complexity : Stable, uncomplicated  ;Clinical Decision Making MEDIUM Complexity : FOTO score of 26-74  Overall Complexity Rating: LOW   Problem List: pain affecting function, decrease ROM, decrease strength, impaired gait/ balance, decrease ADL/ functional abilitiies, decrease activity tolerance, decrease flexibility/ joint mobility, decrease transfer abilities and other FOTO = 36   Treatment Plan may include any combination of the following: Therapeutic exercise, Therapeutic activities, Neuromuscular re-education, Physical agent/modality, Gait/balance training, Manual therapy, Patient education, Self Care training, Functional mobility training, Home safety training and Stair training  Patient / Family readiness to learn indicated by: asking questions, trying to perform skills and interest  Persons(s) to be included in education: patient (P)  Barriers to Learning/Limitations: None  Patient Goal (s): Want the pain to go away and get my ROM and Strength back  Patient Self Reported Health Status: fair  Rehabilitation Potential: good    Short Term Goals: To be accomplished in 5 treatments:  1. Pt will be compliant and independent with HEP in order to facilitate PT sessions and aid with self management   Eval Status:  Initiated   Current Status:  2. Pt to tolerate 30 min or more of TE and/or Interventions w/o increased s/s   Eval Status:  Initiated   Current Status:    Long Term Goals: To be accomplished in 10 treatments:  1.   Pt will report 50% improvement or better with right knee dysfunction to show a significant increase in ability to tolerate ADL   Eval Status:  Initiated   Current Status:  2. Pt will have a negative right slump test to show decreased neural tension of the right LE and progress to increased activity tolerance with little difficulty    Eval Status:  (+) Right Slump Test   Current Status:  3. Pt will demonstrate the ability to lift and carry 10# each hand up and down 8 steps with a reciprocal gait pattern and little difficulty for progress to carrying items up and down her stair with little difficulty   Eval Status:  Difficulty carrying items up and down stairs   Current Status:  4. Pt will demonstrate stair negotiation for 20 6\" steps with a reciprocal gait patter for carryover to walking her stairs at home with little to no difficulty     Eval Status:  Step to gait patter with stairs   Current Status:  5. Pt will improve FOTO score to 41 in 14 visits to show significant improvement in function for progress to performing usual daily activity   Eval Status: FOTO = 36   Current Status:     Frequency / Duration: Patient to be seen 2 times per week for 10 treatments. Patient/ Caregiver education and instruction: Diagnosis, prognosis, self care, activity modification and exercises   [x]  Plan of care has been reviewed with PTA  Comments:  Patient provided w/HEP    Certification Period: 5/2/22 - 5/31/22  Joce Redding, PT 5/2/2022 11:56 AM  _____________________________________________________________________  I certify that the above Therapy Services are being furnished while the patient is under my care. I agree with the treatment plan and certify that this therapy is necessary.     Physician's Signature:____________Date:_________TIME:________     Marcel Fitzgerald MD  ** Signature, Date and Time must be completed for valid certification **    Please sign and return to [facility] at [facility] [address] [facility], [address] [place] [address]  Phone: [phone]      Fax:  [phone]

## 2022-05-18 ENCOUNTER — HOSPITAL ENCOUNTER (OUTPATIENT)
Dept: PHYSICAL THERAPY | Age: 62
Discharge: HOME | End: 2022-05-18
Attending: FAMILY MEDICINE
Payer: MEDICARE

## 2022-05-18 PROCEDURE — 97530 THERAPEUTIC ACTIVITIES: CPT

## 2022-05-18 PROCEDURE — 97110 THERAPEUTIC EXERCISES: CPT

## 2022-05-18 NOTE — PROGRESS NOTES
PT DAILY TREATMENT NOTE     Patient Name: Jenaro Denson  Date:2022  : 1960  [x]  Patient  Verified  Payor: Jose Antoniomary / Plan: [employer] / Product Type: Managed Care Medicare /    In time: 10:38  Out time: 11:20  Total Treatment Time (min): 42  Visit #: 2 of 10    Medicare/BCBS Only   Total Timed Codes (min):  42 1:1 Treatment Time:  42     Treatment Area: Right knee pain [M25.561]    SUBJECTIVE  Pain Level (0-10 scale): 0.5/10   Any medication changes, allergies to medications, adverse drug reactions, diagnosis change, or new procedure performed?: [x] No    [] Yes (see summary sheet for update)  Subjective functional status/changes:   [] No changes reported  Patient stated that she has venous insufficiency so has issues with swelling. Patient reports she has a history of DVT in  and . According to Patient she is currently on blood thinners and patient has a filter. Patient stated she hasn't done her HEP yet. Patient stated she hasn't had a flare up with POTs in awhile. OBJECTIVE    34 min Therapeutic Exercise:  [x] See flow sheet :   Rationale: increase ROM and increase strength to improve the patients ability to perform ADls safely. 8 min Therapeutic Activity:  [x]  See flow sheet : bridge to increase ease with bed mobility; sit to stand transfers    Rationale: increase ROM, increase strength and improve coordination  to improve the patients ability to perform household management tasks. With   [] TE   [] TA   [] neuro   [] other: Patient Education: [x] Review HEP    [] Progressed/Changed HEP based on:   [] positioning   [] body mechanics   [] transfers   [] heat/ice application    [] other:      Other Objective/Functional Measures: initiated exercises per POC     Pain Level (0-10 scale) post treatment: 0/10     ASSESSMENT/Changes in Function: Therapist provided cues for correct technique and muscle activation with exercises.  Patient did not have any issues with standing exercises in regards to POTS, but did have occasional instances of instability. Therapist provided close supervision with standing exercises and gait to ensure safety. Therapist also walked patient out to car to ensure safety. Patient reported no pain at end of the session. Patient will continue to benefit from skilled PT services to modify and progress therapeutic interventions, address functional mobility deficits, address ROM deficits, address strength deficits, analyze and address soft tissue restrictions, analyze and cue movement patterns, analyze and modify body mechanics/ergonomics, assess and modify postural abnormalities and address imbalance/dizziness to attain remaining goals. []  See Plan of Care  []  See progress note/recertification  []  See Discharge Summary         Progress towards goals / Updated goals:  Short Term Goals: To be accomplished in 5 treatments:  1. Pt will be compliant and independent with HEP in order to facilitate PT sessions and aid with self management             Eval Status:  Initiated             Current Status: Not met, as patient stated she hasn't tried doing the exercises yet (5/18/22)   2. Pt to tolerate 30 min or more of TE and/or Interventions w/o increased s/s             Eval Status:  Initiated             Current Status:     Long Term Goals: To be accomplished in 10 treatments:  1. Pt will report 50% improvement or better with right knee dysfunction to show a significant increase in ability to tolerate ADL             Eval Status:  Initiated             Current Status:  2. Pt will have a negative right slump test to show decreased neural tension of the right LE and progress to increased activity tolerance with little difficulty              Eval Status:  (+) Right Slump Test             Current Status:  3.   Pt will demonstrate the ability to lift and carry 10# each hand up and down 8 steps with a reciprocal gait pattern and little difficulty for progress to carrying items up and down her stair with little difficulty             Eval Status:  Difficulty carrying items up and down stairs             Current Status:  4. Pt will demonstrate stair negotiation for 20 6\" steps with a reciprocal gait patter for carryover to walking her stairs at home with little to no difficulty               Eval Status:  Step to gait patter with stairs             Current Status:  5.   Pt will improve FOTO score to 41 in 14 visits to show significant improvement in function for progress to performing usual daily activity             Eval Status: FOTO = 36             Current Status:      PLAN  []  Upgrade activities as tolerated     [x]  Continue plan of care  []  Update interventions per flow sheet       []  Discharge due to:_  []  Other:_      Melanie Puga, PT 5/18/2022  10:39 AM    Future Appointments   Date Time Provider Diana Thompson   7/21/2022  9:00 AM Rome Spatz, MD [facility] BS AMB

## 2022-05-30 DIAGNOSIS — G25.81 RESTLESS LEG: ICD-10-CM

## 2022-05-31 RX ORDER — CYCLOBENZAPRINE HCL 10 MG
TABLET ORAL
Qty: 40 TABLET | Refills: 1 | Status: SHIPPED | OUTPATIENT
Start: 2022-05-31 | End: 2022-06-27

## 2022-06-01 ENCOUNTER — HOSPITAL ENCOUNTER (OUTPATIENT)
Dept: PHYSICAL THERAPY | Age: 62
Discharge: HOME | End: 2022-06-01
Attending: FAMILY MEDICINE
Payer: MEDICARE

## 2022-06-01 PROCEDURE — 97112 NEUROMUSCULAR REEDUCATION: CPT

## 2022-06-01 PROCEDURE — 97530 THERAPEUTIC ACTIVITIES: CPT

## 2022-06-01 PROCEDURE — 97110 THERAPEUTIC EXERCISES: CPT

## 2022-06-01 NOTE — PROGRESS NOTES
PT DAILY TREATMENT NOTE     Patient Name: Vince Ruiz  Date:2022  : 1960  [x]  Patient  Verified  Payor: Orlando Michaelmon / Plan: [employer] / Product Type: Managed Care Medicare /    In time:1230  Out time:115  Total Treatment Time (min): 45  Visit #: 3 of 10    Medicare/BCBS Only   Total Timed Codes (min):  45 1:1 Treatment Time:  45       Treatment Area: Right knee pain [M25.561]    SUBJECTIVE  Pain Level (0-10 scale): 0/10  Any medication changes, allergies to medications, adverse drug reactions, diagnosis change, or new procedure performed?: [x] No    [] Yes (see summary sheet for update)  Subjective functional status/changes:   [] No changes reported  No c/o pain in the right knee.  Reports some stiffness in the Low back    OBJECTIVE    25 min Therapeutic Exercise:  [x] See flow sheet :   Rationale: increase ROM and increase strength to improve the patients ability to perform ADLs    10 min Therapeutic Activity:  [x]  See flow sheet :   Rationale: increase ROM, increase strength and improve coordination  to improve the patients ability to perform ADLs     10 min Neuromuscular Re-education:  [x]  See flow sheet :   Rationale: increase ROM, increase strength and improve coordination  to improve the patients ability to perform ADLs            With   [x] TE   [] TA   [] neuro   [] other: Patient Education: [x] Review HEP    [] Progressed/Changed HEP based on:   [] positioning   [] body mechanics   [] transfers   [] heat/ice application    [] other:      Other Objective/Functional Measures:   - Progressed TE per flow sheet    Pain Level (0-10 scale) post treatment: 0/10    ASSESSMENT/Changes in Function: Patient actively participates in therapy and tolerates progressions well with no c/o increased pain       Patient will continue to benefit from skilled PT services to modify and progress therapeutic interventions, address functional mobility deficits, address ROM deficits, address strength deficits, analyze and address soft tissue restrictions and analyze and cue movement patterns to attain remaining goals.      []  See Plan of Care  []  See progress note/recertification  []  See Discharge Summary         Progress towards goals / Updated goals:  Short Term Goals: To be accomplished in 5 treatments:  1.  Pt will be compliant and independent with HEP in order to facilitate PT sessions and aid with self management             Eval Status:  Initiated             Current Status: Reports compliance with HEP 6/1/22  2.  Pt to tolerate 30 min or more of TE and/or Interventions w/o increased s/s             Eval Status:  Initiated             Current Status: Met, tolerated 45 min without added symptoms      Long Term Goals: To be accomplished in 10 treatments:  1.  Pt will report 50% improvement or better with right knee dysfunction to show a significant increase in ability to tolerate ADL             Eval Status:  Initiated             Current Status:  2.  Pt will have a negative right slump test to show decreased neural tension of the right LE and progress to increased activity tolerance with little difficulty              Eval Status:  (+) Right Slump Test             Current Status:  3.  Pt will demonstrate the ability to lift and carry 10# each hand up and down 8 steps with a reciprocal gait pattern and little difficulty for progress to carrying items up and down her stair with little difficulty             Eval Status:  Difficulty carrying items up and down stairs             Current Status:  4.  Pt will demonstrate stair negotiation for 20 6\" steps with a reciprocal gait patter for carryover to walking her stairs at home with little to no difficulty               Eval Status:  Step to gait patter with stairs             Current Status:  5.  Pt will improve FOTO score to 41 in 14 visits to show significant improvement in function for progress to performing usual daily activity             Eval Status: FOTO = 36             Current Status:        PLAN  [x]  Upgrade activities as tolerated     [x]  Continue plan of care  []  Update interventions per flow sheet       []  Discharge due to:_  []  Other:_      Anna Levin PTA 6/1/2022  12:47 PM    Future Appointments   Date Time Provider Diana Thompson   6/6/2022 11:45 AM Esperanza Hernandez, PTA [facility]   6/8/2022 11:45 AM Sohail Feliciano, PT [facility]   7/21/2022  9:00 AM Sergio Patel MD [facility] BS AMB   7/22/2022 12:00 PM Hira Gibson MD [facility] BS AMB

## 2022-06-06 ENCOUNTER — HOSPITAL ENCOUNTER (OUTPATIENT)
Dept: PHYSICAL THERAPY | Age: 62
Discharge: HOME | End: 2022-06-06
Attending: FAMILY MEDICINE
Payer: MEDICARE

## 2022-06-06 DIAGNOSIS — E03.9 HYPOTHYROIDISM, UNSPECIFIED TYPE: ICD-10-CM

## 2022-06-06 PROCEDURE — 97140 MANUAL THERAPY 1/> REGIONS: CPT

## 2022-06-06 PROCEDURE — 97112 NEUROMUSCULAR REEDUCATION: CPT

## 2022-06-06 PROCEDURE — 97110 THERAPEUTIC EXERCISES: CPT

## 2022-06-06 RX ORDER — LEVOTHYROXINE SODIUM 137 UG/1
TABLET ORAL
Qty: 90 TABLET | Refills: 0 | Status: SHIPPED | OUTPATIENT
Start: 2022-06-06 | End: 2022-06-27

## 2022-06-06 NOTE — PROGRESS NOTES
PT DAILY TREATMENT NOTE     Patient Name: Bean Celestin  Date:2022  : 1960  [x]  Patient  Verified  Payor: Glenn Tra / Plan: [employer] / Product Type: Managed Care Medicare /    In time:1015  Out time:1100  Total Treatment Time (min): 45  Visit #: 1 of 10    Medicare/BCBS Only   Total Timed Codes (min):  45 1:1 Treatment Time:  45       Treatment Area: Right knee pain [M25.561]    SUBJECTIVE  Pain Level (0-10 scale): 6/10  Any medication changes, allergies to medications, adverse drug reactions, diagnosis change, or new procedure performed?: [x] No    [] Yes (see summary sheet for update)  Subjective functional status/changes:   [] No changes reported  Reports increased pain d/t increased walking and lifting over the weekend while shopping. Increased antalgic gait     OBJECTIVE      27 min Therapeutic Exercise:  [x] See flow sheet :   Rationale: increase ROM and increase strength to improve the patients ability to perform ADLs    10 min Neuromuscular Re-education:  [x]  See flow sheet :   Rationale: increase ROM, increase strength and improve coordination  to improve the patients ability to perform ADLs    8 min Manual Therapy:  STM/DTM medial HS    The manual therapy interventions were performed at a separate and distinct time from the therapeutic activities interventions.   Rationale: decrease pain, increase ROM, increase tissue extensibility and decrease trigger points to perform ADLs        With   [x] TE   [] TA   [] neuro   [] other: Patient Education: [x] Review HEP    [] Progressed/Changed HEP based on:   [] positioning   [] body mechanics   [] transfers   [] heat/ice application    [] other:      Other Objective/Functional Measures:   - FOTO = 52   - TTP with tightness in the right HS   - Continued (+) right slump test     Pain Level (0-10 scale) post treatment: 3/10    ASSESSMENT/Changes in Function: Patient actively participates in therapy and tolerates treatment well with no c/o increased pain. Occasional cues for form throughout treatment     Patient will continue to benefit from skilled PT services to modify and progress therapeutic interventions, address functional mobility deficits, address ROM deficits, address strength deficits, analyze and address soft tissue restrictions and analyze and cue movement patterns to attain remaining goals.      []  See Plan of Care  []  See progress note/recertification  []  See Discharge Summary         Progress towards goals / Updated goals:  Goals for this certification period to be accomplished in 10 treatments:  Long Term Goals: To be accomplished in 10 treatments:  1.  Pt will report 50% improvement or better with right knee dysfunction to show a significant increase in ability to tolerate ADL             PN Status: Not met, patient reports 20% improvement thus far              Current Status:   2.  Pt will have a negative right slump test to show decreased neural tension of the right LE and progress to increased activity tolerance with little difficulty              PN Status: Continued right slump test             Current Status:    3.  Pt will demonstrate the ability to lift and carry 10# each hand up and down 8 steps with a reciprocal gait pattern and little difficulty for progress to carrying items up and down her stair with little difficulty             PN Status:  continued difficulty carrying items up and down stairs             Current Status:  4.  Pt will demonstrate stair negotiation for 20 6\" steps with a reciprocal gait patter for carryover to walking her stairs at home with little to no difficulty               PN Status:  Continued step to gait patter with stairs              Current Status:    PLAN  []  Upgrade activities as tolerated     []  Continue plan of care  []  Update interventions per flow sheet       []  Discharge due to:_  []  Other:_      Connor Plascencia, CELESTE 6/6/2022  10:22 AM    Future Appointments   Date Time Provider Diana Donna   6/8/2022 11:45 AM Deshaun Meyer PT [facility]   7/21/2022  9:00 AM Kirk Garvin MD [facility] BS AMB   7/22/2022 12:00 PM Cesar Clifford MD [facility] BS AMB

## 2022-06-06 NOTE — PROGRESS NOTES
[facility] at [address] [facility] [facility], [address] [place] [address]  Phone: [phone]      Fax:  [phone]    Continued Plan of Care/ Re-certification for Physical Therapy Services    Patient name: Michelle Garrett Start of Care: 2022   Referral source: Scott Mccurdy MD : 1960               Medical Diagnosis: Right knee pain [M25.561]  Payor: [facility] / Plan: [employer] / Product Type: Managed Care Medicare /  Onset Date:2021, with recent exacerbation on or about 22               Treatment Diagnosis: Right Knee Pain   Prior Hospitalization: see medical history Provider#: [id number]   Medications: Verified on Patient summary List   Comorbidities: Neuropathy, Depression, Thyroid Problems, HTN, Visual Impaired, POTS (Postural Orthostatic Tachycardia Syndrome, Vascular Disease   Prior Level of Function: Less swelling, redness and pain in (B) Legs                              Visits from Start of Care: 4    Missed Visits: 0    The Plan of [address] be accomplished in 5 treatments:  1.  Pt will be compliant and independent with HEP in order to facilitate PT sessions and aid with self management             Eval Status:  Initiated             Current Status: Reports compliance with HEP   2.  Pt to tolerate 30 min or more of TE and/or Interventions w/o increased s/s             Eval Status:  Initiated             Current Status: Met, tolerated 45 min without added symptoms      Long Term Goals: To be accomplished in 10 treatments:  1.  Pt will report 50% improvement or better with right knee dysfunction to show a significant increase in ability to tolerate ADL             Eval Status:  Initiated             Current Status: Not met, patient reports 20% improvement thus far   2.  Pt will have a negative right slump test to show decreased neural tension of the right LE and progress to increased activity tolerance with little difficulty              Eval Status:  (+) Right Slump Test             Current Status: Continued right slump test   3.  Pt will demonstrate the ability to lift and carry 10# each hand up and down 8 steps with a reciprocal gait pattern and little difficulty for progress to carrying items up and down her stair with little difficulty             Eval Status:  Difficulty carrying items up and down stairs             Current Status:  4.  Pt will demonstrate stair negotiation for 20 6\" steps with a reciprocal gait patter for carryover to walking her stairs at home with little to no difficulty               Eval Status:  Step to gait patter with stairs             Current Status:  5.  Pt will improve FOTO score to 41 in 14 visits to show significant improvement in function for progress to performing usual daily activity             Eval Status: FOTO = 36             Current Status:met  FOTO = 52           Key functional changes: Patient is showing improved function with her current treatment program but it is not sustained to this point. Patient will continue to benefit from skilled PT to address impairments and continue to improve functional mobility and tolerance to daily activity.        Goals for this certification period to be accomplished in 10 treatments:  [address] [facility] be accomplished in 10 treatments:  1.  Pt will report 50% improvement or better with right knee dysfunction to show a significant increase in ability to tolerate ADL             PN Status: Not met, patient reports 20% improvement thus far              Current Status:   2.  Pt will have a negative right slump test to show decreased neural tension of the right LE and progress to increased activity tolerance with little difficulty              PN Status: Continued right slump test             Current Status:    3.  Pt will demonstrate the ability to lift and carry 10# each hand up and down 8 steps with a reciprocal gait pattern and little difficulty for progress to carrying items up and down her stair with little difficulty             PN Status:  continued difficulty carrying items up and down stairs             Current Status:  4.  Pt will demonstrate stair negotiation for 20 6\" steps with a reciprocal gait patter for carryover to walking her stairs at home with little to no difficulty               PN Status:  Continued step to gait patter with stairs              Current Status:    Frequency / Duration: Patient to be seen 2 times per week for 10 treatments:    Assessment / Recommendations:Patient has shown some progress with this treatment program. Pain as of last visit was 6/10. Patient has shown decreased pain and increased strength and mobility. Patient reports 20% improvement with overall involvement. FOTO score is 52. Patient was showing improvement the last couple visits. Today's session patient reported increased pain potentially d/t long distance walking this weekend and moving furniture. Certification Period: 6/6/22-7/5/22    Gregoria Diana, PTA 6/6/2022 10:22 AM  Joce Bueno., MPT       ________________________________________________________________________  I certify that the above Therapy Services are being furnished while the patient is under my care. I agree with the treatment plan and certify that this therapy is necessary. [] I have read the above and request that my patient continue as recommended.   [] I have read the above report and request that my patient continue therapy with the following changes/special instructions: ______________________________________  [] I have read the above report and request that my patient be discharged from therapy    Physician's Signature:____________Date:_________TIME:________     Michelle Romeo MD  ** Signature, Date and Time must be completed for valid certification **    Please sign and return to [facility] at [address] [facility]  Phone: [phone]      Fax:  [phone]

## 2022-06-08 ENCOUNTER — HOSPITAL ENCOUNTER (OUTPATIENT)
Dept: PHYSICAL THERAPY | Age: 62
Discharge: HOME | End: 2022-06-08
Attending: FAMILY MEDICINE
Payer: MEDICARE

## 2022-06-08 PROCEDURE — 97140 MANUAL THERAPY 1/> REGIONS: CPT

## 2022-06-08 PROCEDURE — 97110 THERAPEUTIC EXERCISES: CPT

## 2022-06-08 NOTE — PROGRESS NOTES
PT DAILY TREATMENT NOTE     Patient Name: Lavonne Hammonds  Date:2022  : 1960  [x]  Patient  Verified  Payor: José Milling / Plan: [address] [place] [id number] / Product Type: Managed Care Medicare /    In CJBI:5761  Out time:1245  Total Treatment Time (min): 62  Visit #: 2 of 10    Medicare/BCBS Only   Total Timed Codes (min):  58 1:1 Treatment Time:  62       Treatment Area: Right knee pain [M25.561]    SUBJECTIVE  Pain Level (0-10 scale): 2-3  Any medication changes, allergies to medications, adverse drug reactions, diagnosis change, or new procedure performed?: [x] No    [] Yes (see summary sheet for update)  Subjective functional status/changes:   [] No changes reported  Middle right thigh is stiff and a little painful    OBJECTIVE    Modality rationale: decrease inflammation, decrease pain and increase tissue extensibility to improve the patients ability to tolerate increased activity   Min Type Additional Details    [] Estim:  []Unatt       []IFC  []Premod                        []Other:  []w/ice   []w/heat  Position:  Location:    [] Estim: []Att    []TENS instruct  []NMES                    []Other:  []w/US   []w/ice   []w/heat  Position:  Location:    []  Traction: [] Cervical       []Lumbar                       [] Prone          []Supine                       []Intermittent   []Continuous Lbs:  [] before manual  [] after manual   8 [x]  Ultrasound: [x]Continuous   [] Pulsed                           [x]1MHz   []3MHz W/cm2: 1.7  Location: Right distal medial HS    []  Iontophoresis with dexamethasone         Location: [] Take home patch   [] In clinic    []  Ice     []  heat  []  Ice massage  []  Laser   []  Anodyne Position:  Location:    []  Laser with stim  []  Other:  Position:  Location:    []  Vasopneumatic Device    []  Right     []  Left  Pre-treatment girth:  Post-treatment girth:  Measured at (location):  Pressure:       [] lo [] med [] hi   Temperature: [] lo [] med [] hi   [x] Skin assessment post-treatment:  [x]intact []redness- no adverse reaction    []redness - adverse reaction:     40 min Therapeutic Exercise:  [x] See flow sheet :   Rationale: increase ROM, increase strength and improve coordination to improve the patients ability to perform increased activity      10 min Manual Therapy:  STM/[place] right distal medial HS   The manual therapy interventions were performed at a separate and distinct time from the therapeutic activities interventions. Rationale: decrease pain, increase ROM, increase tissue extensibility and decrease trigger points to perform increased activity      With   [x] TE   [] TA   [] neuro   [] other: Patient Education: [x] Review HEP    [] Progressed/Changed HEP based on:   [] positioning   [] body mechanics   [] transfers   [] heat/ice application    [] other:      Other Objective/Functional Measures:   - TTP medial right thigh and distal HS     Pain Level (0-10 scale) post treatment: 0    ASSESSMENT/Changes in Function:   - Good response to treatment with decreased pain and improved mobility  - Increased ease with performing treatment program indicating increased strength  - Less neural tension noted with mobility    Patient will continue to benefit from skilled PT services to modify and progress therapeutic interventions, address functional mobility deficits, address ROM deficits, address strength deficits, analyze and address soft tissue restrictions and analyze and cue movement patterns to attain remaining goals.      []  See Plan of Care  []  See progress note/recertification  []  See Discharge Summary         Progress towards goals / Updated goals:  Long Term Goals: To be accomplished in 10 treatments:  1.  Pt will report 50% improvement or better with right knee dysfunction to show a significant increase in ability to tolerate ADL             PN Status: Not met, patient reports 20% improvement thus far              Current Status:   2.  Pt will have a negative right slump test to show decreased neural tension of the right LE and progress to increased activity tolerance with little difficulty              PN Status: Continued right slump test             Current Status:  Increased ease of motion with decreased neural tension right LE 6/8/22  3.  Pt will demonstrate the ability to lift and carry 10# each hand up and down 8 steps with a reciprocal gait pattern and little difficulty for progress to carrying items up and down her stair with little difficulty             PN Status:  continued difficulty carrying items up and down stairs             Current Status:  4.  Pt will demonstrate stair negotiation for 20 6\" steps with a reciprocal gait patter for carryover to walking her stairs at home with little to no difficulty               PN Status:  Continued step to gait patter with stairs              Current Status:    PLAN  [x]  Upgrade activities as tolerated     [x]  Continue plan of care  []  Update interventions per flow sheet       []  Discharge due to:_  []  Other:_      Trego Catching, PT 6/8/2022  12:25 PM    Future Appointments   Date Time Provider Diana Thompson   6/13/2022 12:30 PM Esthela Beebe, PTA [facility]   6/15/2022 12:30 PM Esthela Eastover, PTA [facility]   6/20/2022 12:30 PM Esthela Eastover, PTA [facility]   6/22/2022 12:30 PM Esthela Eastover, PTA [facility]   6/27/2022 12:30 PM Esthela Eastover, PTA [facility]   6/29/2022 12:30 PM Esthela Beebe, PTA [facility]   7/21/2022  9:00 AM Dorian Ho MD [facility] BS AMB   7/22/2022 12:00 PM Nena Valadez MD [facility] BS AMB

## 2022-06-13 ENCOUNTER — HOSPITAL ENCOUNTER (OUTPATIENT)
Dept: PHYSICAL THERAPY | Age: 62
Discharge: HOME | End: 2022-06-13
Attending: FAMILY MEDICINE
Payer: MEDICARE

## 2022-06-13 PROCEDURE — 97035 APP MDLTY 1+ULTRASOUND EA 15: CPT

## 2022-06-13 PROCEDURE — 97110 THERAPEUTIC EXERCISES: CPT

## 2022-06-13 PROCEDURE — 97530 THERAPEUTIC ACTIVITIES: CPT

## 2022-06-13 NOTE — PROGRESS NOTES
PT DAILY TREATMENT NOTE     Patient Name: Glen Driver  Date:2022  : 1960  [x]  Patient  Verified  Payor: Carolina Pinon / Plan: [employer] / Product Type: Managed Care Medicare /    In time:1243  Out time:123  Total Treatment Time (min): 40  Visit #: 3 of 10    Medicare/BCBS Only   Total Timed Codes (min):  40 1:1 Treatment Time:  40       Treatment Area: Right knee pain [M25.561]    SUBJECTIVE  Pain Level (0-10 scale): 0/10  Any medication changes, allergies to medications, adverse drug reactions, diagnosis change, or new procedure performed?: [x] No    [] Yes (see summary sheet for update)  Subjective functional status/changes:   [] No changes reported  Reports no pain currently but states pain was an 8/10 this morning and \"12/10\" last night    OBJECTIVE    decrease inflammation, decrease pain and increase tissue extensibility to improve the patients ability to tolerate increased activity    Min Type Additional Details     []? Estim:  []? Unatt       []? IFC  []? Premod                        []?Other:  []?w/ice   []?w/heat  Position:  Location:     []? Estim: []? Att    []? TENS instruct  []? NMES                    []?Other:  []?w/US   []?w/ice   []?w/heat  Position:  Location:     []? Traction: []? Cervical       []? Lumbar                       []? Prone          []? Supine                       []?Intermittent   []? Continuous Lbs:  []? before manual  []? after manual   8 [x]? Ultrasound: [x]? Continuous   []? Pulsed                           [x]? 1MHz   []? 3MHz W/cm2: 1.7  Location: Right distal medial HS     []? Iontophoresis with dexamethasone         Location: []? Take home patch   []? In clinic     []? Ice     []?  heat  []? Ice massage  []? Laser   []? Anodyne Position:  Location:     []? Laser with stim  []? Other:  Position:  Location:     []? Vasopneumatic Device    []? Right     []?   Left  Pre-treatment girth:  Post-treatment girth:  Measured at (location):  Pressure:       []? lo []? med []? hi   Temperature: []? lo []? med []? hi   [x]? Skin assessment post-treatment:  [x]? intact []? redness- no adverse reaction    []? redness - adverse reaction:       22 min Therapeutic Exercise:  [x] See flow sheet :   Rationale: increase ROM and increase strength to improve the patients ability to perform ADLs    10 min Therapeutic Activity:  [x]  See flow sheet :   Rationale: increase ROM, increase strength, improve coordination and improve balance  to improve the patients ability to perform ADLs        With   [x] TE   [] TA   [] neuro   [] other: Patient Education: [x] Review HEP    [] Progressed/Changed HEP based on:   [] positioning   [] body mechanics   [] transfers   [] heat/ice application    [] other:      Other Objective/Functional Measures:   - Progressed TE per flow sheet  - SLS for 15\" on (B) LE, patient requires intermittent balance checks      Pain Level (0-10 scale) post treatment: 2/10    ASSESSMENT/Changes in Function: Patient actively participates in therapy and tolerates progressions well with minimal reports of increased pain     Patient will continue to benefit from skilled PT services to modify and progress therapeutic interventions, address functional mobility deficits, address ROM deficits, address strength deficits, analyze and address soft tissue restrictions and analyze and cue movement patterns to attain remaining goals.      []  See Plan of Care  []  See progress note/recertification  []  See Discharge Summary         Progress towards goals / Updated goals:  Long Term Goals: To be accomplished in 10 treatments:  1.  Pt will report 50% improvement or better with right knee dysfunction to show a significant increase in ability to tolerate ADL             PN Status: Not met, patient reports 20% improvement thus far              Current Status:   2.  Pt will have a negative right slump test to show decreased neural tension of the right LE and progress to increased activity tolerance with little difficulty              PN Status: Continued right slump test             Current Status:  Increased ease of motion with decreased neural tension right LE 6/8/22  3.  Pt will demonstrate the ability to lift and carry 10# each hand up and down 8 steps with a reciprocal gait pattern and little difficulty for progress to carrying items up and down her stair with little difficulty             PN Status:  continued difficulty carrying items up and down stairs             Current Status:  4.  Pt will demonstrate stair negotiation for 20 6\" steps with a reciprocal gait patter for carryover to walking her stairs at home with little to no difficulty               PN Status:  Continued step to gait patter with stairs              Current Status:    PLAN  [x]  Upgrade activities as tolerated     [x]  Continue plan of care  []  Update interventions per flow sheet       []  Discharge due to:_  []  Other:_      Shanna Perez PTA 6/13/2022  12:44 PM    Future Appointments   Date Time Provider Diana Thompson   6/15/2022 12:30 PM Lana Thomas, PTA [facility]   6/20/2022 12:30 PM Lana Thomas, PTA [facility]   6/22/2022 12:30 PM Lanaedelmira Thomas, PTA [facility]   6/27/2022 12:30 PM Lanaedelmiar Thomas, PTA [facility]   6/29/2022 12:30 PM Lana Thomas, PTA [facility]   7/21/2022  9:00 AM Ave Henderson MD [facility] BS AMB   7/22/2022 12:00 PM Betty Slater MD [facility] BS AMB

## 2022-06-20 ENCOUNTER — HOSPITAL ENCOUNTER (OUTPATIENT)
Dept: PHYSICAL THERAPY | Age: 62
Discharge: HOME | End: 2022-06-20
Attending: FAMILY MEDICINE
Payer: MEDICARE

## 2022-06-20 ENCOUNTER — APPOINTMENT (OUTPATIENT)
Dept: PHYSICAL THERAPY | Age: 62
End: 2022-06-20
Attending: FAMILY MEDICINE
Payer: MEDICARE

## 2022-06-20 PROCEDURE — 97530 THERAPEUTIC ACTIVITIES: CPT

## 2022-06-20 PROCEDURE — 97110 THERAPEUTIC EXERCISES: CPT

## 2022-06-20 PROCEDURE — 97161 PT EVAL LOW COMPLEX 20 MIN: CPT

## 2022-06-20 NOTE — PROGRESS NOTES
PT DAILY TREATMENT NOTE     Patient Name: Parminder De La Rosa  Date:2022  : 1960  [x]  Patient  Verified  Payor: Brigidorm Osullivan / Plan: [address] [place] [address] / Product Type: Managed Care Medicare /    In time:1243  Out time:148  Total Treatment Time (min): 72  Visit #: 4 of 10    Medicare/BCBS Only   Total Timed Codes (min):  65 1:1 Treatment Time:  65       Treatment Area: Right knee pain [M25.561]    SUBJECTIVE  Pain Level (0-10 scale): 1  Any medication changes, allergies to medications, adverse drug reactions, diagnosis change, or new procedure performed?: [x] No    [] Yes (see summary sheet for update)  Subjective functional status/changes:   [] No changes reported  Feels weak.     OBJECTIVE    [] Skin assessment post-treatment:  []intact []redness- no adverse reaction    []redness - adverse reaction:     50 min Therapeutic Exercise:  [x] See flow sheet :   Rationale: increase ROM, increase strength and improve coordination to improve the patients ability to tolerate increased activity  15 min Therapeutic Activity:  [x]  See flow sheet :   Rationale: increase ROM, increase strength and improve coordination  to improve the patients ability to improve daily activity          With   [x] TE   [] TA   [] neuro   [] other: Patient Education: [x] Review HEP    [] Progressed/Changed HEP based on:   [] positioning   [] body mechanics   [] transfers   [] heat/ice application    [] other:      Other Objective/Functional Measures:   - MMT (B) Hip Flx 3+  - Continued weakness with Hip Flx exercises  - Continued Trendelenburg at the right Hip  - Initiate step up and stairs     Pain Level (0-10 scale) post treatment: 0    ASSESSMENT/Changes in Function:   - Good tolerance with treatment with improved stair negotiation today    Patient will continue to benefit from skilled PT services to modify and progress therapeutic interventions, address functional mobility deficits, address ROM deficits, address strength deficits, analyze and address soft tissue restrictions and analyze and cue movement patterns to attain remaining goals.      []  See Plan of Care  []  See progress note/recertification  []  See Discharge Summary         Progress towards goals / Updated goals:  Long Term Goals: To be accomplished in 10 treatments:  1.  Pt will report 50% improvement or better with right knee dysfunction to show a significant increase in ability to tolerate ADL             PN Status: Not met, patient reports 20% improvement thus far              Current Status:   2.  Pt will have a negative right slump test to show decreased neural tension of the right LE and progress to increased activity tolerance with little difficulty              PN Status: Continued right slump test             Current Status:  Increased ease of motion with decreased neural tension right LE 6/8/22  3.  Pt will demonstrate the ability to lift and carry 10# each hand up and down 8 steps with a reciprocal gait pattern and little difficulty for progress to carrying items up and down her stair with little difficulty             PN Status:  continued difficulty carrying items up and down stairs             Current Status:   4.  Pt will demonstrate stair negotiation for 20 6\" steps with a reciprocal gait patter for carryover to walking her stairs at home with little to no difficulty               PN Status:  Continued step to gait patter with stairs              Current Status: Periodic reciprocal gait but mostly step to gait, tolerated 16 6\" steps with [facility]  6/20/22    PLAN  [x]  Upgrade activities as tolerated     [x]  Continue plan of care  []  Update interventions per flow sheet       []  Discharge due to:_  []  Other:_      Claudine Galeas, PT 6/20/2022  1:08 PM    Future Appointments   Date Time Provider Diana Thompson   6/22/2022 12:30 PM Nancy Canas PTA [facility] [address]   6/27/2022 12:30 PM Nancy Canas PTA [facility] [address]   6/29/2022 12:30 PM Renee Henderson, [facility]   7/21/2022  9:00 AM MD OZZIE Castellon BS AMB   7/22/2022 12:00 PM Gerald Reese MD [facility] BS AMB

## 2022-06-22 ENCOUNTER — HOSPITAL ENCOUNTER (OUTPATIENT)
Dept: PHYSICAL THERAPY | Age: 62
Discharge: HOME | End: 2022-06-22
Attending: FAMILY MEDICINE
Payer: MEDICARE

## 2022-06-22 PROCEDURE — 97112 NEUROMUSCULAR REEDUCATION: CPT

## 2022-06-22 PROCEDURE — 97530 THERAPEUTIC ACTIVITIES: CPT

## 2022-06-22 PROCEDURE — 97110 THERAPEUTIC EXERCISES: CPT

## 2022-06-22 NOTE — PROGRESS NOTES
PT DAILY TREATMENT NOTE     Patient Name: Sy Madsen  Date:2022  : 1960  [x]  Patient  Verified  Payor: Dang Rivas / Plan: [id number] / Product Type: Managed Care Medicare /    In time:1227  Out time:115  Total Treatment Time (min): 48  Visit #: 5 of 10    Medicare/BCBS Only   Total Timed Codes (min):  48 1:1 Treatment Time:  48       Treatment Area: Right knee pain [M25.561]    SUBJECTIVE  Pain Level (0-10 scale): 1/10  Any medication changes, allergies to medications, adverse drug reactions, diagnosis change, or new procedure performed?: [x] No    [] Yes (see summary sheet for update)  Subjective functional status/changes:   [] No changes reported  Continues to report 1/10 pain     OBJECTIVE    30 min Therapeutic Exercise:  [x] See flow sheet :   Rationale: increase ROM and increase strength to improve the patients ability to perform ADLs    10 min Therapeutic Activity:  [x]  See flow sheet :   Rationale: increase ROM, increase strength, improve coordination and improve balance  to improve the patients ability to perform ADLs     8 min Neuromuscular Re-education:  [x]  See flow sheet :   Rationale: increase ROM, increase strength, improve coordination and improve balance  to improve the patients ability to perform ADLs            With   [x] TE   [] TA   [] neuro   [] other: Patient Education: [x] Review HEP    [] Progressed/Changed HEP based on:   [] positioning   [] body mechanics   [] transfers   [] heat/ice application    [] other:      Other Objective/Functional Measures:   -Initiated SLS, patient unable to tolerate standing for more than 10 seconds without use of UE for balance    - Step ups 6\" box 2x10     Pain Level (0-10 scale) post treatment: 1/10    ASSESSMENT/Changes in Function: Patient actively participates in therapy and tolerates progressions well with no c/o increased pain       Patient will continue to benefit from skilled PT services to modify and progress therapeutic interventions, address functional mobility deficits, address ROM deficits, address strength deficits, analyze and address soft tissue restrictions and analyze and cue movement patterns to attain remaining goals.      []  See Plan of Care  []  See progress note/recertification  []  See Discharge Summary         Progress towards goals / Updated goals:  Long Term Goals: To be accomplished in 10 treatments:  1.  Pt will report 50% improvement or better with right knee dysfunction to show a significant increase in ability to tolerate ADL             PN Status: Not met, patient reports 20% improvement thus far              Current Status:   2.  Pt will have a negative right slump test to show decreased neural tension of the right LE and progress to increased activity tolerance with little difficulty              PN Status: Continued right slump test             Current Status:  Increased ease of motion with decreased neural tension right LE 6/8/22  3.  Pt will demonstrate the ability to lift and carry 10# each hand up and down 8 steps with a reciprocal gait pattern and little difficulty for progress to carrying items up and down her stair with little difficulty             PN Status:  continued difficulty carrying items up and down stairs             Current Status:   4.  Pt will demonstrate stair negotiation for 20 6\" steps with a reciprocal gait patter for carryover to walking her stairs at home with little to no difficulty               PN Status:  Continued step to gait patter with stairs              Current Status: Periodic reciprocal gait but mostly step to gait, tolerated 16 6\" steps with HHA  6/20/22       PLAN  [x]  Upgrade activities as tolerated     [x]  Continue plan of care  []  Update interventions per flow sheet       []  Discharge due to:_  []  Other:_      Sandie Palmer, CELESTE 6/22/2022  12:32 PM    Future Appointments   Date Time Provider Diana Thompson   [facility]   [facility]2022 12:30 [facility]   7/21/2022  9:00 AM Kelly East MD [facility] BS AMB   7/22/2022 12:00 PM Mylene Angela MD [facility] BS AMB

## 2022-06-26 DIAGNOSIS — E03.9 HYPOTHYROIDISM, UNSPECIFIED TYPE: ICD-10-CM

## 2022-06-26 DIAGNOSIS — G90.A POTS (POSTURAL ORTHOSTATIC TACHYCARDIA SYNDROME): ICD-10-CM

## 2022-06-26 DIAGNOSIS — G25.81 RESTLESS LEG: ICD-10-CM

## 2022-06-26 DIAGNOSIS — R60.0 LOCALIZED EDEMA: ICD-10-CM

## 2022-06-27 ENCOUNTER — HOSPITAL ENCOUNTER (OUTPATIENT)
Dept: PHYSICAL THERAPY | Age: 62
Discharge: HOME | End: 2022-06-27
Attending: FAMILY MEDICINE
Payer: MEDICARE

## 2022-06-27 PROCEDURE — 97110 THERAPEUTIC EXERCISES: CPT

## 2022-06-27 PROCEDURE — 97112 NEUROMUSCULAR REEDUCATION: CPT

## 2022-06-27 PROCEDURE — 97530 THERAPEUTIC ACTIVITIES: CPT

## 2022-06-27 RX ORDER — CYCLOBENZAPRINE HCL 10 MG
TABLET ORAL
Qty: 40 TABLET | Refills: 1 | Status: SHIPPED | OUTPATIENT
Start: 2022-06-27 | End: 2022-09-12 | Stop reason: SDUPTHER

## 2022-06-27 RX ORDER — FUROSEMIDE 40 MG/1
TABLET ORAL
Qty: 40 TABLET | Refills: 1 | Status: SHIPPED | OUTPATIENT
Start: 2022-06-27 | End: 2022-08-08 | Stop reason: SINTOL

## 2022-06-27 RX ORDER — LEVOTHYROXINE SODIUM 137 UG/1
TABLET ORAL
Qty: 90 TABLET | Refills: 0 | Status: SHIPPED | OUTPATIENT
Start: 2022-06-27 | End: 2022-09-12 | Stop reason: SDUPTHER

## 2022-06-27 NOTE — PROGRESS NOTES
PT DAILY TREATMENT NOTE     Patient Name: Leonor Pendleton  Date:2022  : 1960  [x]  Patient  Verified  Payor: Urmila Burris / Plan: Mendel Mac / Product Type: Managed Care Medicare /    In time:1230  Out time:114  Total Treatment Time (min): 44  Visit #: 6 of 10    Medicare/BCBS Only   Total Timed Codes (min):  44 1:1 Treatment Time:  44       Treatment Area: Right knee pain [M25.561]    SUBJECTIVE  Pain Level (0-10 scale): 0-3/10  Any medication changes, allergies to medications, adverse drug reactions, diagnosis change, or new procedure performed?: [x] No    [] Yes (see summary sheet for update)  Subjective functional status/changes:   [] No changes reported  Patient reports pain that fluctuates between 0-3, redness in (B) feet with some increased swelling     OBJECTIVE    24 min Therapeutic Exercise:  [x] See flow sheet :   Rationale: increase ROM and increase strength to improve the patients ability to perform ADLs    10 min Therapeutic Activity:  [x]  See flow sheet :   Rationale: increase ROM, increase strength and improve coordination  to improve the patients ability to perform ADLs     10 min Neuromuscular Re-education:  [x]  See flow sheet :   Rationale: increase ROM, increase strength and improve coordination  to improve the patients ability to perform ADLs      With   [x] TE   [] TA   [] neuro   [] other: Patient Education: [x] Review HEP    [] Progressed/Changed HEP based on:   [] positioning   [] body mechanics   [] transfers   [] heat/ice application    [] other:      Other Objective/Functional Measures:   - Decreased activity tolerance   - SLR increased to 3x10     Pain Level (0-10 scale) post treatment: 0/10    ASSESSMENT/Changes in Function: Patient actively participates in therapy and tolerates progressions well with no c/o increased pain       Patient will continue to benefit from skilled PT services to modify and progress therapeutic interventions, address functional mobility deficits, address ROM deficits, address strength deficits, analyze and address soft tissue restrictions and analyze and cue movement patterns to attain remaining goals.      []  See Plan of Care  []  See progress note/recertification  []  See Discharge Summary         Progress towards goals / Updated goals:  Long Term Goals: To be accomplished in 10 treatments:  1.  Pt will report 50% improvement or better with right knee dysfunction to show a significant increase in ability to tolerate ADL             PN Status: Not met, patient reports 20% improvement thus far              Current Status:   2.  Pt will have a negative right slump test to show decreased neural tension of the right LE and progress to increased activity tolerance with little difficulty              PN Status: Continued right slump test             Current Status:  Increased ease of motion with decreased neural tension right LE 6/8/22  3.  Pt will demonstrate the ability to lift and carry 10# each hand up and down 8 steps with a reciprocal gait pattern and little difficulty for progress to carrying items up and down her stair with little difficulty             PN Status:  continued difficulty carrying items up and down stairs             Current Status:   4.  Pt will demonstrate stair negotiation for 20 6\" steps with a reciprocal gait patter for carryover to walking her stairs at home with little to no difficulty               PN Status:  Continued step to gait patter with stairs              Current Status: Periodic reciprocal gait but mostly step to gait, tolerated 16 6\" steps with HHA  6/20/22   `    PLAN  [x]  Upgrade activities as tolerated     [x]  Continue plan of care  []  Update interventions per flow sheet       []  Discharge due to:_  []  Other:_      Meaghan Ortiz PTA 6/27/2022  12:37 PM    Future Appointments   Date Time Provider Diana Thompson   6/29/2022 12:30 PM Fili Lange PTA [place] [facility] 7/21/2022  9:00 AM MD OZZIE Hernandez BS AMB   7/22/2022 12:00 PM Erica Key MD [facility] BS AMB

## 2022-06-28 RX ORDER — MIDODRINE HYDROCHLORIDE 5 MG/1
5 TABLET ORAL 3 TIMES DAILY
Qty: 90 TABLET | Refills: 5 | Status: SHIPPED | OUTPATIENT
Start: 2022-06-28 | End: 2022-07-22 | Stop reason: ALTCHOICE

## 2022-06-28 RX ORDER — MIDODRINE HYDROCHLORIDE 5 MG/1
TABLET ORAL
Qty: 270 TABLET | Refills: 1 | OUTPATIENT
Start: 2022-06-28

## 2022-06-29 ENCOUNTER — APPOINTMENT (OUTPATIENT)
Dept: PHYSICAL THERAPY | Age: 62
End: 2022-06-29
Attending: FAMILY MEDICINE
Payer: MEDICARE

## 2022-07-15 DIAGNOSIS — F51.01 PRIMARY INSOMNIA: ICD-10-CM

## 2022-07-18 RX ORDER — TRAZODONE HYDROCHLORIDE 100 MG/1
100 TABLET ORAL
Qty: 90 TABLET | Refills: 0 | Status: SHIPPED | OUTPATIENT
Start: 2022-07-18 | End: 2022-09-12 | Stop reason: SDUPTHER

## 2022-07-20 ENCOUNTER — HOSPITAL ENCOUNTER (OUTPATIENT)
Dept: PHYSICAL THERAPY | Age: 62
Discharge: HOME | End: 2022-07-20
Attending: FAMILY MEDICINE
Payer: MEDICARE

## 2022-07-20 PROCEDURE — 97530 THERAPEUTIC ACTIVITIES: CPT

## 2022-07-20 PROCEDURE — 97110 THERAPEUTIC EXERCISES: CPT

## 2022-07-20 PROCEDURE — 97112 NEUROMUSCULAR REEDUCATION: CPT

## 2022-07-20 NOTE — PROGRESS NOTES
PT DAILY TREATMENT NOTE     Patient Name: Yanely Lares  Date:2022  : 1960  [x]  Patient  Verified  Payor: Mehrdad Spain / Plan: [employer] / Product Type: Managed Care Medicare /    In time:158  Out time:252  Total Treatment Time (min): 47  Visit #: 1 of 10      Treatment Area: Right knee pain [M25.561]    SUBJECTIVE  Pain Level (0-10 scale): 3/10  Any medication changes, allergies to medications, adverse drug reactions, diagnosis change, or new procedure performed?: [x] No    [] Yes (see summary sheet for update)  Subjective functional status/changes:   [] No changes reported  3/10 pain, using a SPC    OBJECTIVE      30 min Therapeutic Exercise:  [] See flow sheet :   Rationale: increase ROM and increase strength to improve the patients ability to perform ADLs    14 min Therapeutic Activity:  []  See flow sheet :   Rationale: increase ROM, increase strength, and improve coordination  to improve the patients ability to perform ADLs     10 min Neuromuscular Re-education:  []  See flow sheet :   Rationale: increase ROM, increase strength, and improve coordination  to improve the patients ability to perform ADLs         With   [x] TE   [] TA   [] neuro   [] other: Patient Education: [x] Review HEP    [] Progressed/Changed HEP based on:   [] positioning   [] body mechanics   [] transfers   [] heat/ice application    [] other:      Other Objective/Functional Measures:   - FOTO =50    - Decreased step ups to 4\" box d/t increased pain/weakness     Pain Level (0-10 scale) post treatment: 3/10    ASSESSMENT/Changes in Function: Patient actively participates in therapy and tolerates treatment well. Patient reports no improvement since last PN. Is now using SPC regularly with all amb.      Patient will continue to benefit from skilled PT services to modify and progress therapeutic interventions, address functional mobility deficits, address ROM deficits, address strength deficits, analyze and address soft tissue restrictions, and analyze and cue movement patterns to attain remaining goals. []  See Plan of Care  []  See progress note/recertification  []  See Discharge Summary         Progress towards goals / Updated goals:  Long Term Goals: To be accomplished in 10 treatments:  1. Pt will report 50% improvement or better with right knee dysfunction to show a significant increase in ability to tolerate ADL             PN Status: Not met, patient reports 20% improvement thus far              Current Status:  2. Pt will have a negative right slump test to show decreased neural tension of the right LE and progress to increased activity tolerance with little difficulty             PN Status: Continued right slump test             Current Status:  Increased ease of motion with decreased neural tension right LE  3. Pt will demonstrate the ability to lift and carry 10# each hand up and down 8 steps with a reciprocal gait pattern and little difficulty for progress to carrying items up and down her stair with little difficulty             PN Status:  continued difficulty carrying items up and down stairs             Current Status: NT, difficulty going up and down stairs without HHA and step to pattern   4.   Pt will demonstrate stair negotiation for 20 6\" steps with a reciprocal gait patter for carryover to walking her stairs at home with little to no difficulty               PN Status:  Continued step to gait patter with stairs             Current Status: Periodic reciprocal gait but mostly step to gait, tolerated 16 6\" steps with HHA     PLAN  [x]  Upgrade activities as tolerated     [x]  Continue plan of care  []  Update interventions per flow sheet       []  Discharge due to:_  []  Other:_      Carmelo Irwin, PTA 7/20/2022  2:00 PM    Future Appointments   Date Time Provider Diana Thompson   7/21/2022  9:00 AM Clif Hillman MD [facility] BS AMB   7/22/2022 12:00 PM Seymour Menendez MD [facility] BS AMB   7/26/2022  2:00 PM Clive Lorenzo, PTA [facility]   8/2/2022  2:00 PM Tiana Cancino, [place] [facility]   8/9/2022  2:00 PM Tiana Cancino, PT [facility]   8/16/2022  2:00 PM Tiana Cancino, PT [facility]   8/23/2022  2:00 PM Tiana Cancino, [place] [facility]   8/30/2022  2:00 PM Tiana Cancino, PT [facility]

## 2022-07-20 NOTE — PROGRESS NOTES
[facility] [address] [facility]  Phone: [phone]      Fax:  [phone]    Continued Plan of Care/ Re-certification for Physical Therapy Services    Patient name: Elif Leon Start of Care: 2022   Referral source: Julio César Estevez MD : 1960               Medical Diagnosis: Right knee pain [M25.561]  Payor: Alexander Segovia / Plan: [employer] / Product Type: Managed Care Medicare /  Onset Date:2021, with recent exacerbation on or about 22               Treatment Diagnosis: Right Knee Pain   Prior Hospitalization: see medical history Provider#: [id number]   Medications: Verified on Patient summary List   Comorbidities: Neuropathy, Depression, Thyroid Problems, HTN, Visual Impaired, POTS (Postural Orthostatic Tachycardia Syndrome, Vascular Disease   Prior Level of Function: Less swelling, redness and pain in (B) Legs                                 Visits from Start of Care: 10    Missed Visits: 3    The Plan of Care and following information is based on the patient's current status:  Long Term Goals: To be accomplished in 10 treatments:  1. Pt will report 50% improvement or better with right knee dysfunction to show a significant increase in ability to tolerate ADL             PN Status: Not met, patient reports 20% improvement thus far              Current Status:  2. Pt will have a negative right slump test to show decreased neural tension of the right LE and progress to increased activity tolerance with little difficulty             PN Status: Continued right slump test             Current Status:  Increased ease of motion with decreased neural tension right LE   3.   Pt will demonstrate the ability to lift and carry 10# each hand up and down 8 steps with a reciprocal gait pattern and little difficulty for progress to carrying items up and down her stair with little difficulty             PN Status: continued difficulty carrying items up and down stairs             Current Status: NT, difficulty going up and down stairs without HHA and step to pattern   4. Pt will demonstrate stair negotiation for 20 6\" steps with a reciprocal gait patter for carryover to walking her stairs at home with little to no difficulty               PN Status:  Continued step to gait patter with stairs             Current Status: Periodic reciprocal gait but mostly step to gait, tolerated 16 6\" steps with HHA      Key functional changes: Patient has shown some  progress with this treatment program. Pain as of last visit was 3/10. Patient's pain has been fluctuating, but shows some improved strength and mobility. Patient continues to report 20% improvement with overall involvement. FOTO score is 50. Patient reports \"going backwards\" in progress, however patient has not been seen since June 27 d/t insurance and family obligations. Patient using SPC full time now d/t increased pain. Problem List: pain affecting function, decrease ROM, decrease strength, impaired gait/ balance, decrease ADL/ functional abilitiies, decrease activity tolerance, decrease flexibility/ joint mobility, decrease transfer abilities and other FOTO = 36       Treatment Plan may include any combination of the following: Therapeutic exercise, Therapeutic activities, Neuromuscular re-education, Physical agent/modality, Gait/balance training, Manual therapy, Patient education, Self Care training, Functional mobility training, Home safety training and Stair training    Patient Goal (s) has been updated and includes: \"Become stronger\"     Goals for this certification period to be accomplished in 10 treatments:  Long Term Goals: To be accomplished in 10 treatments:  1.   Pt will report 50% improvement or better with right knee dysfunction to show a significant increase in ability to tolerate ADL             PN Status: Not met, patient reports 20% improvement thus far              Current Status:  2. Pt will have a negative right slump test to show decreased neural tension of the right LE and progress to increased activity tolerance with little difficulty             PN Status: Increased ease of motion with decreased neural tension right LE              Current Status:    3. Pt will demonstrate the ability to lift and carry 10# each hand up and down 8 steps with a reciprocal gait pattern and little difficulty for progress to carrying items up and down her stair with little difficulty             PN Status:   NT, difficulty negotiating stairs without HHA and step-to pattern              Current Status:  4. Pt will demonstrate stair negotiation for 20 6\" steps with a reciprocal gait patter for carryover to walking her stairs at home with little to no difficulty               PN Status:  Periodic reciprocal gait but mostly step to gait, tolerated 16 6\" steps with HHA             Current Status:     Frequency / Duration: Patient to be seen 2 times per week for 10 treatments:    Assessment / Recommendations:Patient is showing some improved function with her current treatment program but it is not sustained to this point. Patient will continue to benefit from skilled PT to address impairments and continue to improve functional mobility and tolerance to daily activity. Certification Period: 7/20/22-8/18/22    Marva Moeller, PTA 7/20/2022 2:03 PM  Haroon Mansfield, PT 7/25/2022      ________________________________________________________________________  I certify that the above Therapy Services are being furnished while the patient is under my care. I agree with the treatment plan and certify that this therapy is necessary. [] I have read the above and request that my patient continue as recommended.   [] I have read the above report and request that my patient continue therapy with the following changes/special instructions: ______________________________________  [] I have read the above report and request that my patient be discharged from therapy    Physician's Signature:____________Date:_________TIME:________     Aneudy Bah MD  ** Signature, Date and Time must be completed for valid certification **    Please sign and return to [facility] at [address] [facility]  [place], [facility] [address]  Phone: [phone]      Fax:  [phone]

## 2022-07-22 ENCOUNTER — OFFICE VISIT (OUTPATIENT)
Dept: CARDIOLOGY CLINIC | Age: 62
End: 2022-07-22
Payer: MEDICARE

## 2022-07-22 VITALS
OXYGEN SATURATION: 97 % | BODY MASS INDEX: 36.51 KG/M2 | WEIGHT: 255 LBS | HEART RATE: 62 BPM | HEIGHT: 70 IN | DIASTOLIC BLOOD PRESSURE: 73 MMHG | SYSTOLIC BLOOD PRESSURE: 120 MMHG

## 2022-07-22 DIAGNOSIS — Z86.711 HISTORY OF PULMONARY EMBOLISM: ICD-10-CM

## 2022-07-22 DIAGNOSIS — I10 ESSENTIAL HYPERTENSION: ICD-10-CM

## 2022-07-22 DIAGNOSIS — R60.9 EDEMA, UNSPECIFIED TYPE: ICD-10-CM

## 2022-07-22 DIAGNOSIS — G90.A POTS (POSTURAL ORTHOSTATIC TACHYCARDIA SYNDROME): Primary | ICD-10-CM

## 2022-07-22 DIAGNOSIS — Z98.84 HISTORY OF GASTRIC BYPASS: ICD-10-CM

## 2022-07-22 PROCEDURE — G8754 DIAS BP LESS 90: HCPCS | Performed by: INTERNAL MEDICINE

## 2022-07-22 PROCEDURE — G8417 CALC BMI ABV UP PARAM F/U: HCPCS | Performed by: INTERNAL MEDICINE

## 2022-07-22 PROCEDURE — 99214 OFFICE O/P EST MOD 30 MIN: CPT | Performed by: INTERNAL MEDICINE

## 2022-07-22 PROCEDURE — 3017F COLORECTAL CA SCREEN DOC REV: CPT | Performed by: INTERNAL MEDICINE

## 2022-07-22 PROCEDURE — G9899 SCRN MAM PERF RSLTS DOC: HCPCS | Performed by: INTERNAL MEDICINE

## 2022-07-22 PROCEDURE — G8427 DOCREV CUR MEDS BY ELIG CLIN: HCPCS | Performed by: INTERNAL MEDICINE

## 2022-07-22 PROCEDURE — G8432 DEP SCR NOT DOC, RNG: HCPCS | Performed by: INTERNAL MEDICINE

## 2022-07-22 PROCEDURE — 93000 ELECTROCARDIOGRAM COMPLETE: CPT | Performed by: INTERNAL MEDICINE

## 2022-07-22 PROCEDURE — G8752 SYS BP LESS 140: HCPCS | Performed by: INTERNAL MEDICINE

## 2022-07-22 RX ORDER — METOPROLOL TARTRATE 25 MG/1
TABLET, FILM COATED ORAL
Qty: 90 TABLET | Refills: 3 | Status: SHIPPED | OUTPATIENT
Start: 2022-07-22

## 2022-07-22 NOTE — PROGRESS NOTES
HISTORY OF PRESENT ILLNESS  Erika Soriano is a 62 y.o. female. Follow-up of hypertension, orthostatic hypotension, obesity    4/19 history of syncope about 2014 approximately. It happened after gastric bypass surgery when she had lost a lot of weight. 4/19 history of CP lower sternal, radiates to the back. No relation to activity or food. Lasting many hours. No radiation no associated nausea vomiting diaphoresis. Feels harder to breathe during the episode. No medicines. Spontaneous relief in few hours. 7/2019 C/O substernal chest pain with exertion. She continues to have dizziness and palpitations in the evenings. She denies having syncopal episode. Dizziness  The history is provided by the Patient. This is a new problem. The current episode started more than 1 week ago (yrs; see comments also). The problem occurs rarely (once in a few months). The problem has not changed (almost daily AMs) since onset. Associated symptoms include chest pain and shortness of breath. Pertinent negatives include no headaches. Exacerbated by: driving. The symptoms are relieved by rest (pull over if driving). Palpitations   The history is provided by the Patient. This is a new problem. The current episode started more than 1 week ago (few weaks, feels a hard beat but the rate is still 50-60.). The problem has been rapidly improving. Associated symptoms include chest pain, dizziness and shortness of breath. Pertinent negatives include no fever, no malaise/fatigue, no claudication, no orthopnea, no PND, no nausea, no vomiting, no headaches and no cough. Her past medical history is significant for hypertension. Slow Heart Rate  The history is provided by the Patient and medical records. This is a new (46s) problem. The current episode started more than 1 week ago. Associated symptoms include chest pain and shortness of breath. Pertinent negatives include no headaches.    Follow-up  Associated symptoms include chest pain and shortness of breath. Pertinent negatives include no headaches. Shortness of Breath  The history is provided by the Patient. This is a new problem. The average episode lasts 5 minutes. The problem occurs intermittently. Associated symptoms include chest pain. Pertinent negatives include no fever, no headaches, no cough, no wheezing, no PND, no orthopnea, no vomiting, no rash, no leg swelling and no claudication. The problem's precipitants include exercise (1 flight at night). Review of Systems   Constitutional:  Negative for chills, fever, malaise/fatigue and weight loss. HENT:  Negative for nosebleeds. Eyes:  Negative for discharge. Respiratory:  Positive for shortness of breath. Negative for cough and wheezing. Cardiovascular:  Positive for chest pain and palpitations. Negative for orthopnea, claudication, leg swelling and PND. Gastrointestinal:  Negative for diarrhea, nausea and vomiting. Genitourinary:  Negative for dysuria and hematuria. Musculoskeletal:  Negative for joint pain. Skin:  Negative for rash. Neurological:  Positive for dizziness. Negative for seizures, loss of consciousness and headaches. Endo/Heme/Allergies:  Negative for polydipsia. Does not bruise/bleed easily. Psychiatric/Behavioral:  Negative for depression and substance abuse. The patient does not have insomnia.     No Known Allergies    Past Medical History:   Diagnosis Date    Arthritis     Chronic obstructive pulmonary disease (HCC)     hx pe    Coagulation disorder (HCC)     clotting disorder    Hx of colonic polyps     HX OTHER MEDICAL     pulmonary embolism x 2    HX OTHER MEDICAL     PCOS    HX OTHER MEDICAL     Heart monitor 1/2015     Hypertension     no longer on meds since gastric bypass    Long term (current) use of anticoagulants     Osteopenia     PE (pulmonary thromboembolism) ([facility] [address] 2008    Plantar fasciitis of left foot     Tendinopathy Oct 2013    Right insertional Achilles tendinopathy Thromboembolus ([facility] [address]     blood clot in left leg then lung in , then clot behind right knee     Thromboembolus ([facility] [address]     also PE    Thyroid disease        Family History   Problem Relation Age of Onset    Diabetes Other     Hypertension Other     Heart Disease Other     OSTEOARTHRITIS Other     Stroke Mother 66    Stroke Father 36    Heart Attack Neg Hx        Social History     Tobacco Use    Smoking status: Former     Packs/day: 0.00     Years: 0.00     Pack years: 0.00     Types: Cigarettes     Quit date: 2021     Years since quittin.1    Smokeless tobacco: Never    Tobacco comments:     vapor pen   Substance Use Topics    Alcohol use: No     Alcohol/week: 3.3 standard drinks     Types: 2 Glasses of wine, 2 Standard drinks or equivalent per week     Comment: once weekly; quit 2018    Drug use: No        Current Outpatient Medications   Medication Sig    traZODone (DESYREL) 100 mg tablet TAKE 1 TABLET BY MOUTH NIGHTLY (Patient taking differently: Take 50 mg by mouth nightly.)    furosemide (LASIX) 40 mg tablet TAKE 1 TABLET BY MOUTH EVERY DAY    levothyroxine (SYNTHROID) 137 mcg tablet TAKE 1 TABLET BY MOUTH EVERY DAY BEFORE BREAKFAST    cyclobenzaprine (FLEXERIL) 10 mg tablet TAKE 1 TABLET BY MOUTH TWICE A DAY    rivaroxaban (Xarelto) 20 mg tab tablet TAKE 1 TABLET BY MOUTH EVERY DAY IN THE MORNING WITH BREAKFAST    sertraline (ZOLOFT) 50 mg tablet Take 1 Tablet by mouth daily. pramipexole (Mirapex) 1 mg tablet Take 1 Tablet by mouth two (2) times a day. 2 tabs daily    metoprolol tartrate (LOPRESSOR) 25 mg tablet 12.5 mg = 0.5 tab each dose, PO, bid, # 30 tab, 4 Refills, Pharmacy: [facility]/pharmacy #[id number]   B.infantis-B.ani-B.long-B.bifi (Probiotic 4X) 10-15 mg TbEC Take  by mouth. vit B complex-C-folic ac-zinc 039 mcg- 12.5 mg tab Take  by mouth.    calcium citrate-vitamin d3 (CITRACAL+D) 315-200 mg-unit tab Take 1 Tab by mouth daily (with breakfast). melatonin 10 mg tab Take  by mouth. 6mg-9 mg as needed    comprs. stocking,thigh,long,med (COMP. STOCKING,THIGH,LONG,MED.) misc 2 Packages by Does Not Apply route daily as needed (edema). Remove when lying down    multivitamin (ONE A DAY) tablet Take 1 Tab by mouth two (2) times a day. Cholecalciferol, Vitamin D3, 25 mcg (1,000 unit) chew Take 2,000 Units by mouth daily. No current facility-administered medications for this visit. Past Surgical History:   Procedure Laterality Date    HX DILATION AND CURETTAGE      ablation    HX DILATION AND CURETTAGE      age 17    HX GASTRIC BYPASS  1/15/2014    HX TUBAL LIGATION  1996       Visit Vitals  /73 (BP 1 Location: Left upper arm, BP Patient Position: Sitting, BP Cuff Size: Adult)   Pulse 62   Ht 5' 10\" (1.778 m)   Wt 115.7 kg (255 lb)   LMP 12/31/2013   SpO2 97%   BMI 36.59 kg/m²     Vitals:    07/22/22 1212   BP: 120/73   BP 1 Location: Left upper arm   BP Patient Position: Sitting   BP Cuff Size: Adult   Pulse: 62   Height: 5' 10\" (1.778 m)   Weight: 115.7 kg (255 lb)   SpO2: 97%         Diagnostic Studies:  I have reviewed the relevant tests done on the patient and show as follows  EKG tracings reviewed by me today. EKG Results       Procedure 720 Value Units Date/Time    AMB POC EKG ROUTINE W/ 12 LEADS, INTER & REP [[id number]] Resulted: 07/22/22 1226    Order Status: Completed Updated: 07/22/22 1229          XR Results (most recent):  Results from Abstract encounter on 05/27/21    XR CHEST PA LAT      No flowsheet data found. 4/19 echo  Interpretation Summary        Normal cavity size, systolic function (ejection fraction normal) and diastolic function. Mild concentric hypertrophy. The estimated ejection fraction is 56 - 60%. No regional wall motion abnormality noted. Normal right ventricular size and function. Mild tricuspid valve regurgitation is present. Pulmonary arterial systolic pressure is 74.2 mmHg. There is no evidence of pulmonary hypertension.   Mildly elevated central venous pressure (5-10 mmHg); IVC diameter is less than 21 mm and collapses less than 50% with respiration. Comparison Study Information   Prior Study   There is a prior study available for comparison that was performed on 11/25/2014. As compared to the previous study, there are no significant changes. 7/19 TMT  Interpretation Summary     Baseline ECG: Normal sinus rhythm. Negative stress test.  Low risk Duke treadmill score. Appropriate heart rate and blood pressure response to exercise. Appropriate heart rate recovery   3/15 cardiac cath   1. No significant coronary artery disease  2. Normal left ventricular systolic function with LV EF 60%  3. Normal left ventricular end-diastolic pressure. RECOMMENDATIONS:   1. Risk factor modification including exercise. 2. Evaluation for noncardiac etiologies of chest discomfort. CT CTA CHEST PULMONARY3/25/2019  [facility]  Result Impression     1. No CT evidence of pulmonary thromboembolism. 2. Calcification in the LAD coronary artery. 5/2019 - Event monitor   No evidence of severe roberto or tachy arrhythmias. Patient markers associated with sinus rhythm    Ms. Oumar Montanez has a reminder for a \"due or due soon\" health maintenance. I have asked that she contact her primary care provider for follow-up on this health maintenance. Physical Exam  Constitutional:       General: She is not in acute distress. Appearance: She is well-developed. She is obese. Comments: Tall stature   HENT:      Head: Normocephalic and atraumatic. Mouth/Throat:      Dentition: Normal dentition. Eyes:      General: No scleral icterus. Right eye: No discharge. Left eye: No discharge. Neck:      Thyroid: No thyromegaly. Vascular: No carotid bruit or JVD. Cardiovascular:      Rate and Rhythm: Normal rate and regular rhythm. Pulses: Intact distal pulses.       Heart sounds: Normal heart sounds, S1 normal and S2 normal. No murmur heard. No friction rub. No gallop. Pulmonary:      Effort: Pulmonary effort is normal.      Breath sounds: Normal breath sounds. No wheezing or rales. Abdominal:      Palpations: Abdomen is soft. There is no mass. Tenderness: There is no abdominal tenderness. Musculoskeletal:      Cervical back: Neck supple. Right lower leg: Edema (1) present. Left lower leg: Edema (1) present. Lymphadenopathy:      Cervical:      Right cervical: No superficial cervical adenopathy. Left cervical: No superficial cervical adenopathy. Skin:     General: Skin is warm and dry. Findings: No rash. Neurological:      Mental Status: She is alert and oriented to person, place, and time. Psychiatric:         Behavior: Behavior normal.       ASSESSMENT and PLAN    Patient advised to stop smoking to reduce future cardiovascular events. 11/19 Patient seems to have POTS already by orthostatic changes in the office today. As discussed with her and daughter in the office, we would not need to do tilt table for diagnosis yet. She was advised and strongly recommended to use the muscle training to increase the muscle tone and strength including some resistance exercises. Joining yoga may be good for overall muscle tone as well as balance. Plenty of water intake and continue the present medications. I will discontinue biotin as it may not be absolutely necessary. Will check vitamin D and B12 levels to rule out any significant deficiency or toxicity. 5/20 As she has significantly improved by following lifestyle changes as well as Midodrin. Unfortunately, she continues to smoke. We discussed again and advised her to stop smoking as future significant COPD may cause POTS symptoms to become even worse. She is using her compression stockings. Since the blood pressure is staying better she wanted to see if the dose of midodrine can be reduced or she can skip sometimes.   I agree with that and I have given her the liberty to try to reduce the dose by half or skip the medicine sometimes and let me know next time how much she consumes. 5/27/2021 Aure says symptomatically improved well. No need of midodrine anymore. Continue low-dose metoprolol. Tobacco cessation reinforced and discussed again. She says she stopped it today. Lifelong anticoagulation due to recurrent DVT/PE and now noted to have factor V Leiden mutation. Cleared from cardiac view with the understanding that patient will have mild risk for this knee replacement surgery. Diagnoses and all orders for this visit:    1. POTS (postural orthostatic tachycardia syndrome)  -     AMB POC EKG ROUTINE W/ 12 LEADS, INTER & REP  -     metoprolol tartrate (LOPRESSOR) 25 mg tablet; 12.5 mg = 0.5 tab each dose, PO, bid, # 30 tab, 4 Refills, Pharmacy: [facility]/pharmacy #[id number]   2. History of gastric bypass    3. History of pulmonary embolism    4. Edema, unspecified type  Comments:  Secondary to knee problems as well as varicose veins. Orders:  -     METABOLIC PANEL, BASIC; Future    5. Essential hypertension  -     METABOLIC PANEL, BASIC; Future  -     LIPID PANEL; Future        Pertinent laboratory and test data reviewed and discussed with patient. See patient instructions also for other medical advice given    Medications Discontinued During This Encounter   Medication Reason    dextroamphetamine-amphetamine (ADDERALL) 5 mg tablet Not A Current Medication    midodrine (PROAMATINE) 5 mg tablet Therapy Completed    metoprolol tartrate (LOPRESSOR) 25 mg tablet REORDER       Follow-up and Dispositions    Return in about 1 year (around 7/22/2023), or if symptoms worsen or fail to improve, for post test.       7/22/2022 at doing well as far as POTS concerned. And no need for midodrine for many months which will be discontinued. Has edema and takes Lasix occasionally.   Try to tell her to not take Lasix as regularly as it may precipitate worsening of POTS.  Use compression stockings which she does most of the times. She has been to vein doctors in the received ablations of the veins already. Blood pressure is controlled. Labs as ordered.

## 2022-07-22 NOTE — PROGRESS NOTES
1. Have you been to the ER, urgent care clinic since your last visit? Hospitalized since your last visit? No    2. Have you seen or consulted any other health care providers outside of the [address] [place] [address] since your last visit? Include any pap smears or colon screening. No     3. Since your last visit, have you had any of the following symptoms? chest pains, palpitations, shortness of breath, dizziness, and swelling in legs/arms. 4.  Have you had any blood work, X-rays or cardiac testing? No     Requested: NO     In [facility]: NO    5. Where do you normally have your labs drawn? PCP    6. Do you need any refills today?    NO

## 2022-07-22 NOTE — PATIENT INSTRUCTIONS
Learning About the [address] [facility] Diet  What is the Mediterranean diet? The Mediterranean diet is a style of eating rather than a diet plan. It features foods eaten in [place], [place], [place] and [place], and other countries along the [facility]. It emphasizes eating foods like fish, fruits, vegetables, beans, high-fiber breads and whole grains, nuts, and olive oil. This style of eating includes limited red meat, cheese, and sweets. Why choose the Mediterranean diet? A Mediterranean-style diet may improve heart health. It contains more fat than other heart-healthy diets. But the fats are mainly from nuts, unsaturated oils (such as fish oils and olive oil), and certain nut or seed oils (such as canola, soybean, or flaxseed oil). These fats may help protect the heart and blood vessels. How can you get started on the Mediterranean diet? Here are some things you can do to switch to a more Mediterranean way of eating. What to eat  Eat a variety of fruits and vegetables each day, such as grapes, blueberries, tomatoes, broccoli, peppers, figs, olives, spinach, eggplant, beans, lentils, and chickpeas. Eat a variety of whole-grain foods each day, such as oats, brown rice, and whole wheat bread, pasta, and couscous. Eat fish at least 2 times a week. Try tuna, salmon, mackerel, lake trout, herring, or sardines. Eat moderate amounts of low-fat dairy products, such as milk, cheese, or yogurt. Eat moderate amounts of poultry and eggs. Choose healthy (unsaturated) fats, such as nuts, olive oil, and certain nut or seed oils like canola, soybean, and flaxseed. Limit unhealthy (saturated) fats, such as butter, palm oil, and coconut oil. And limit fats found in animal products, such as meat and dairy products made with whole milk. Try to eat red meat only a few times a month in very small amounts. Limit sweets and desserts to only a few times a week. This includes sugar-sweetened drinks like soda.   The Mediterranean diet may also include red wine with your meal--1 glass each day for women and up to 2 glasses a day for men. Tips for eating at home  Use herbs, spices, garlic, lemon zest, and citrus juice instead of salt to add flavor to foods. Add avocado slices to your sandwich instead of thakkar. Have fish for lunch or dinner instead of red meat. Brush the fish with olive oil, and broil or grill it. Sprinkle your salad with seeds or nuts instead of cheese. Cook with olive or canola oil instead of butter or oils that are high in saturated fat. Switch from 2% milk or whole milk to 1% or fat-free milk. Dip raw vegetables in a vinaigrette dressing or hummus instead of dips made from mayonnaise or sour cream.  Have a piece of fruit for dessert instead of a piece of cake. Try baked apples, or have some dried fruit. Tips for eating out  Try broiled, grilled, baked, or poached fish instead of having it fried or breaded. Ask your  to have your meals prepared with olive oil instead of butter. Order dishes made with marinara sauce or sauces made from olive oil. Avoid sauces made from cream or mayonnaise. Choose whole-grain breads, whole wheat pasta and pizza crust, brown rice, beans, and lentils. Cut back on butter or margarine on bread. Instead, you can dip your bread in a small amount of olive oil. Ask for a side salad or grilled vegetables instead of french fries or chips. Where can you learn more? Go to http://www.scan[employer].com/  Enter O407 in the search box to learn more about \"Learning About the Mediterranean Diet. \"  Current as of: September 8, 2021               Content Version: 13.2  © 4953-6320 [employer]. Care instructions adapted under license by [employer] (which disclaims liability or warranty for this information).  If you have questions about a medical condition or this instruction, always ask your healthcare professional. [employer] disclaims any warranty or liability for your use of this information.

## 2022-07-25 ENCOUNTER — HOSPITAL ENCOUNTER (OUTPATIENT)
Dept: LAB | Age: 62
Discharge: HOME | End: 2022-07-25

## 2022-07-25 ENCOUNTER — CLINICAL SUPPORT (OUTPATIENT)
Dept: FAMILY MEDICINE CLINIC | Age: 62
End: 2022-07-25
Payer: MEDICARE

## 2022-07-25 DIAGNOSIS — I10 PRIMARY HYPERTENSION: ICD-10-CM

## 2022-07-25 DIAGNOSIS — E03.9 HYPOTHYROIDISM, UNSPECIFIED TYPE: Primary | ICD-10-CM

## 2022-07-25 DIAGNOSIS — K90.9 INTESTINAL MALABSORPTION, UNSPECIFIED TYPE: ICD-10-CM

## 2022-07-25 DIAGNOSIS — K21.00 GASTROESOPHAGEAL REFLUX DISEASE WITH ESOPHAGITIS WITHOUT HEMORRHAGE: ICD-10-CM

## 2022-07-25 DIAGNOSIS — I10 ESSENTIAL HYPERTENSION: ICD-10-CM

## 2022-07-25 DIAGNOSIS — Z86.718 HISTORY OF DVT (DEEP VEIN THROMBOSIS): ICD-10-CM

## 2022-07-25 DIAGNOSIS — Z98.84 HISTORY OF GASTRIC BYPASS: ICD-10-CM

## 2022-07-25 DIAGNOSIS — F32.A DEPRESSION, UNSPECIFIED DEPRESSION TYPE: ICD-10-CM

## 2022-07-25 DIAGNOSIS — R53.1 WEAKNESS: ICD-10-CM

## 2022-07-25 LAB — SENTARA SPECIMEN COL,SENBCF: NORMAL

## 2022-07-25 PROCEDURE — 36415 COLL VENOUS BLD VENIPUNCTURE: CPT | Performed by: FAMILY MEDICINE

## 2022-07-25 PROCEDURE — 99001 SPECIMEN HANDLING PT-LAB: CPT

## 2022-07-26 LAB
ABSOLUTE LYMPHOCYTE COUNT, 10803: 1.2 K/UL (ref 1–4.8)
ALBUMIN SERPL-MCNC: 4 G/DL (ref 3.5–5)
ANION GAP SERPL CALC-SCNC: 12 MMOL/L (ref 3–15)
BASOPHILS # BLD: 0.1 K/UL (ref 0–0.2)
BASOPHILS NFR BLD: 1 % (ref 0–2)
BUN SERPL-MCNC: 15 MG/DL (ref 6–22)
CALCIUM SERPL-MCNC: 9 MG/DL (ref 8.4–10.5)
CHLORIDE SERPL-SCNC: 103 MMOL/L (ref 98–110)
CO2 SERPL-SCNC: 25 MMOL/L (ref 20–32)
CORRECTED SERUM CREATININE, 11222: 0.85 MG/DL
CREAT SERPL-MCNC: 0.8 MG/DL (ref 0.8–1.4)
EOSINOPHIL # BLD: 0.1 K/UL (ref 0–0.5)
EOSINOPHIL NFR BLD: 3 % (ref 0–6)
ERYTHROCYTE [DISTWIDTH] IN BLOOD BY AUTOMATED COUNT: 15 % (ref 10–15.5)
GLOMERULAR FILTRATION RATE: >60 ML/MIN/1.73 SQ.M.
GLUCOSE SERPL-MCNC: 78 MG/DL (ref 70–99)
GRANULOCYTES,GRANS: 56 % (ref 40–75)
HCT VFR BLD AUTO: 40.7 % (ref 35.1–48)
HGB BLD-MCNC: 12.2 G/DL (ref 11.7–16)
IRON,IRN: 46 MCG/DL (ref 30–160)
LYMPHOCYTES, LYMLT: 28 % (ref 20–45)
MCH RBC QN AUTO: 28 PG (ref 26–34)
MCHC RBC AUTO-ENTMCNC: 30 G/DL (ref 31–36)
MCV RBC AUTO: 94 FL (ref 80–99)
MONOCYTES # BLD: 0.5 K/UL (ref 0.1–1)
MONOCYTES NFR BLD: 12 % (ref 3–12)
NEUTROPHILS # BLD AUTO: 2.5 K/UL (ref 1.8–7.7)
PHOSPHATE SERPL-MCNC: 3.6 MG/DL (ref 2.5–4.5)
PLATELET # BLD AUTO: 229 K/UL (ref 140–440)
PMV BLD AUTO: 10.4 FL (ref 9–13)
POTASSIUM SERPL-SCNC: 4.3 MMOL/L (ref 3.5–5.5)
RBC # BLD AUTO: 4.32 M/UL (ref 3.8–5.2)
SODIUM SERPL-SCNC: 140 MMOL/L (ref 133–145)
T4 FREE SERPL-MCNC: 1.3 NG/DL (ref 0.9–1.8)
TSH SERPL DL<=0.005 MIU/L-ACNC: 1.93 MCU/ML (ref 0.27–4.2)
VIT B12 SERPL-MCNC: 724 PG/ML (ref 211–911)
WBC # BLD AUTO: 4.4 K/UL (ref 4–11)

## 2022-08-02 ENCOUNTER — APPOINTMENT (OUTPATIENT)
Dept: PHYSICAL THERAPY | Age: 62
End: 2022-08-02
Attending: FAMILY MEDICINE

## 2022-08-03 ENCOUNTER — TELEPHONE (OUTPATIENT)
Dept: FAMILY MEDICINE CLINIC | Age: 62
End: 2022-08-03

## 2022-08-03 NOTE — TELEPHONE ENCOUNTER
Pt's  called to inform office that pt is in the ER and has been there since this morning. Pt will be there overnight as they are waiting for medication to do a Stress Test on pt's heart. Pt is currently on a heart monitor. Pt is also experiencing blood pooling in leg from Vascular. Please review and advise as needed.

## 2022-08-08 ENCOUNTER — OFFICE VISIT (OUTPATIENT)
Dept: FAMILY MEDICINE CLINIC | Age: 62
End: 2022-08-08
Payer: MEDICARE

## 2022-08-08 VITALS
BODY MASS INDEX: 36.65 KG/M2 | HEART RATE: 60 BPM | OXYGEN SATURATION: 99 % | SYSTOLIC BLOOD PRESSURE: 128 MMHG | HEIGHT: 70 IN | RESPIRATION RATE: 10 BRPM | WEIGHT: 256 LBS | DIASTOLIC BLOOD PRESSURE: 82 MMHG | TEMPERATURE: 98.1 F

## 2022-08-08 DIAGNOSIS — Z00.00 WELCOME TO MEDICARE PREVENTIVE VISIT: ICD-10-CM

## 2022-08-08 DIAGNOSIS — E66.01 MORBID OBESITY (HCC): ICD-10-CM

## 2022-08-08 DIAGNOSIS — R60.0 LOCALIZED EDEMA: Primary | ICD-10-CM

## 2022-08-08 PROCEDURE — G8752 SYS BP LESS 140: HCPCS | Performed by: FAMILY MEDICINE

## 2022-08-08 PROCEDURE — 3017F COLORECTAL CA SCREEN DOC REV: CPT | Performed by: FAMILY MEDICINE

## 2022-08-08 PROCEDURE — 99213 OFFICE O/P EST LOW 20 MIN: CPT | Performed by: FAMILY MEDICINE

## 2022-08-08 PROCEDURE — G8510 SCR DEP NEG, NO PLAN REQD: HCPCS | Performed by: FAMILY MEDICINE

## 2022-08-08 PROCEDURE — G8754 DIAS BP LESS 90: HCPCS | Performed by: FAMILY MEDICINE

## 2022-08-08 PROCEDURE — G8417 CALC BMI ABV UP PARAM F/U: HCPCS | Performed by: FAMILY MEDICINE

## 2022-08-08 PROCEDURE — G8427 DOCREV CUR MEDS BY ELIG CLIN: HCPCS | Performed by: FAMILY MEDICINE

## 2022-08-08 PROCEDURE — G9899 SCRN MAM PERF RSLTS DOC: HCPCS | Performed by: FAMILY MEDICINE

## 2022-08-08 PROCEDURE — G0402 INITIAL PREVENTIVE EXAM: HCPCS | Performed by: FAMILY MEDICINE

## 2022-08-08 RX ORDER — CARBOXYMETHYLCELLULOSE/CITRIC 0.75 G
0.75 CAPSULE ORAL
Qty: 100 CAPSULE | Refills: 1 | Status: SHIPPED | OUTPATIENT
Start: 2022-08-08

## 2022-08-08 RX ORDER — FUROSEMIDE 40 MG/1
40 TABLET ORAL 2 TIMES DAILY
Qty: 60 TABLET | Refills: 1 | Status: SHIPPED | OUTPATIENT
Start: 2022-08-08 | End: 2022-09-06 | Stop reason: SDUPTHER

## 2022-08-08 NOTE — PATIENT INSTRUCTIONS

## 2022-08-08 NOTE — PROGRESS NOTES
Chief Complaint   Patient presents with    Pre-op Exam    Shortness of Breath     ER fjannette     Welcome To Medicare         This is a \"Welcome to [employer]"  Initial Preventive Physical Examination (IPPE) providing Personalized Prevention Plan Services (Performed in the first 12 months of enrollment)    I have reviewed the patient's medical history in detail and updated the computerized patient record. Assessment/Plan   Education and counseling provided:  Are appropriate based on today's review and evaluation    1. Welcome to Medicare preventive visit     Depression Risk Screen     3 most recent PHQ Screens 8/8/2022   PHQ Not Done -   Little interest or pleasure in doing things Not at all   Feeling down, depressed, irritable, or hopeless Several days   Total Score PHQ 2 1   Trouble falling or staying asleep, or sleeping too much Not at all   Feeling tired or having little energy Not at all   Poor appetite, weight loss, or overeating Not at all   Feeling bad about yourself - or that you are a failure or have let yourself or your family down Not at all   Trouble concentrating on things such as school, work, reading, or watching TV Not at all   Moving or speaking so slowly that other people could have noticed; or the opposite being so fidgety that others notice Not at all   Thoughts of being better off dead, or hurting yourself in some way Not at all   PHQ 9 Score 1   How difficult have these problems made it for you to do your work, take care of your home and get along with others Not difficult at all       Alcohol & Drug Abuse Risk Screen    Do you average more than 1 drink per night or more than 7 drinks a week:  No    On any one occasion in the past three months have you have had more than 3 drinks containing alcohol:  No          Functional Ability and Level of Safety    Diet: No special diet      Hearing: Hearing is good. Vision Screening:  Vision is good.   Vision Screening    Right eye Left eye Both eyes Without correction      With correction 20/25 20/20 20/20         Activities of Daily Living: The home contains: no safety equipment. Patient does total self care      Ambulation: with difficulty, uses a cane and walker sometimes does not need device      Exercise level: moderately active     Fall Risk Screen:  Fall Risk Assessment, last 12 mths 8/8/2022   Able to walk? Yes   Fall in past 12 months? 0   Do you feel unsteady? 0   Are you worried about falling 0   Number of falls in past 12 months -   Fall with injury? -      Abuse Screen:  Patient is not abused       Screening EKG   EKG order placed: No    End of Life Planning   Advanced care planning directives were discussed with the patient and /or family/caregiver.      Health Maintenance Due     Health Maintenance Due   Topic Date Due    Hepatitis C Screening  Never done    DTaP/Tdap/Td series (1 - Tdap) Never done    Cervical cancer screen  Never done    Shingrix Vaccine Age 49> (1 of 2) Never done    COVID-19 Vaccine (4 - Booster for [place] series) 03/05/2022       Patient Care Team   Patient Care Team:  Randall Perez MD as PCP - General (Family Medicine)  Randall Perez MD as PCP - [facility] Provider  Ambreen Lopez MD (Surgical Oncology)  Shira Mills MD as Physician (General Surgery)  Frank Ramirez MD (Cardiovascular Disease Physician)  Lurdes Roy DO (Orthopedic Surgery)    History     Past Medical History:   Diagnosis Date    Arthritis     Chronic obstructive pulmonary disease ([address]     hx pe    Coagulation disorder ([address]     clotting disorder    Hx of colonic polyps     HX OTHER MEDICAL     pulmonary embolism x 2    HX OTHER MEDICAL     PCOS    HX OTHER MEDICAL     Heart monitor 1/2015     Hypertension     no longer on meds since gastric bypass    Long term (current) use of anticoagulants     Osteopenia     PE (pulmonary thromboembolism) ([address] 2008    Plantar fasciitis of left foot     Tendinopathy Oct 2013    Right insertional Achilles tendinopathy    Thromboembolus ([address]     blood clot in left leg then lung in 2007, then clot behind right knee 2009    Thromboembolus [facility])     also PE    Thyroid disease       Past Surgical History:   Procedure Laterality Date    HX DILATION AND CURETTAGE      ablation    HX DILATION AND CURETTAGE      age 17    HX GASTRIC BYPASS  1/15/2014    HX TUBAL LIGATION  1996     Current Outpatient Medications   Medication Sig Dispense Refill    metoprolol tartrate (LOPRESSOR) 25 mg tablet 12.5 mg = 0.5 tab each dose, PO, bid, # 30 tab, 4 Refills, Pharmacy: [facility]/pharmacy #[id number] Tablet 3    traZODone (DESYREL) 100 mg tablet TAKE 1 TABLET BY MOUTH NIGHTLY (Patient taking differently: Take 50 mg by mouth nightly.) 90 Tablet 0    furosemide (LASIX) 40 mg tablet TAKE 1 TABLET BY MOUTH EVERY DAY 40 Tablet 1    levothyroxine (SYNTHROID) 137 mcg tablet TAKE 1 TABLET BY MOUTH EVERY DAY BEFORE BREAKFAST 90 Tablet 0    cyclobenzaprine (FLEXERIL) 10 mg tablet TAKE 1 TABLET BY MOUTH TWICE A DAY 40 Tablet 1    rivaroxaban (Xarelto) 20 mg tab tablet TAKE 1 TABLET BY MOUTH EVERY DAY IN THE MORNING WITH BREAKFAST 30 Tablet 3    sertraline (ZOLOFT) 50 mg tablet Take 1 Tablet by mouth daily. 90 Tablet 1    pramipexole (Mirapex) 1 mg tablet Take 1 Tablet by mouth two (2) times a day. 2 tabs daily 60 Tablet 2    B.infantis-B.ani-B.long-B.bifi (Probiotic 4X) 10-15 mg TbEC Take  by mouth. vit B complex-C-folic ac-zinc 424 mcg- 12.5 mg tab Take  by mouth.      calcium citrate-vitamin d3 (CITRACAL+D) 315-200 mg-unit tab Take 1 Tab by mouth daily (with breakfast). melatonin 10 mg tab Take  by mouth. 6mg-9 mg as needed      comprs. stocking,thigh,long,med (COMP. STOCKING,THIGH,LONG,MED.) misc 2 Packages by Does Not Apply route daily as needed (edema). Remove when lying down 2 Package 0    multivitamin (ONE A DAY) tablet Take 1 Tab by mouth two (2) times a day.       Cholecalciferol, Vitamin D3, 25 mcg (1,000 unit) chew Take 2,000 Units by mouth daily.        No Known Allergies    Family History   Problem Relation Age of Onset    Diabetes Other     Hypertension Other     Heart Disease Other     OSTEOARTHRITIS Other     Stroke Mother 66    Stroke Father 36    Heart Attack Neg Hx      Social History     Tobacco Use    Smoking status: Former     Packs/day: 0.00     Years: 0.00     Pack years: 0.00     Types: Cigarettes     Quit date: 2021     Years since quittin.2    Smokeless tobacco: Never    Tobacco comments:     vapor pen   Substance Use Topics    Alcohol use: No     Alcohol/week: 3.3 standard drinks     Types: 2 Glasses of wine, 2 Standard drinks or equivalent per week     Comment: once weekly; quit        Socorro Pedro MD

## 2022-08-08 NOTE — PROGRESS NOTES
Elif Leon is a 62 y.o. female  presents for follow up with edema of lower ext. No fever chills SOB weakness or numbness. No Known Allergies  Outpatient Medications Marked as Taking for the 8/8/22 encounter (Office Visit) with Julio César Estevez MD   Medication Sig Dispense Refill    furosemide (LASIX) 40 mg tablet Take 1 Tablet by mouth two (2) times a day. 60 Tablet 1    carboxymethylcellulose-citric (Plenity) 0.75 gram cap Take 0.75 g by mouth daily as needed for PRN Reason (Other) (weight loss). 100 Capsule 1    metoprolol tartrate (LOPRESSOR) 25 mg tablet 12.5 mg = 0.5 tab each dose, PO, bid, # 30 tab, 4 Refills, Pharmacy: [facility]/pharmacy #[id number] Tablet 3    traZODone (DESYREL) 100 mg tablet TAKE 1 TABLET BY MOUTH NIGHTLY (Patient taking differently: Take 50 mg by mouth nightly.) 90 Tablet 0    levothyroxine (SYNTHROID) 137 mcg tablet TAKE 1 TABLET BY MOUTH EVERY DAY BEFORE BREAKFAST 90 Tablet 0    cyclobenzaprine (FLEXERIL) 10 mg tablet TAKE 1 TABLET BY MOUTH TWICE A DAY 40 Tablet 1    rivaroxaban (Xarelto) 20 mg tab tablet TAKE 1 TABLET BY MOUTH EVERY DAY IN THE MORNING WITH BREAKFAST 30 Tablet 3    sertraline (ZOLOFT) 50 mg tablet Take 1 Tablet by mouth daily. 90 Tablet 1    pramipexole (Mirapex) 1 mg tablet Take 1 Tablet by mouth two (2) times a day. 2 tabs daily 60 Tablet 2    B.infantis-B.ani-B.long-B.bifi (Probiotic 4X) 10-15 mg TbEC Take  by mouth. vit B complex-C-folic ac-zinc 794 mcg- 12.5 mg tab Take  by mouth.      calcium citrate-vitamin d3 (CITRACAL+D) 315-200 mg-unit tab Take 1 Tab by mouth daily (with breakfast). melatonin 10 mg tab Take  by mouth. 6mg-9 mg as needed      comprs. stocking,thigh,long,med (COMP. STOCKING,THIGH,LONG,MED.) misc 2 Packages by Does Not Apply route daily as needed (edema). Remove when lying down 2 Package 0    multivitamin (ONE A DAY) tablet Take 1 Tab by mouth two (2) times a day.       Cholecalciferol, Vitamin D3, 25 mcg (1,000 unit) chew Take 2,000 Units by mouth daily.        Patient Active Problem List   Diagnosis Code    Hypothyroidism E03.9    DJD (degenerative joint disease) M19.90    DVT (deep venous thrombosis) ([facility]) I82.409    Morbid obesity ([facility]) E66.01    HTN (hypertension) I10    FAINA (stress urinary incontinence, female) N39.3    GERD (gastroesophageal reflux disease) K21.9    [place] on CPAP G47.33, Z99.89    History of pulmonary embolism Z86.711    History of DVT of lower extremity Z86.718    Bradycardia R00.1    History of gastric bypass Z98.84    Palpitations R00.2    History of hypertension Z86.79    POTS (postural orthostatic tachycardia syndrome) I49.8    Tobacco abuse counseling Z71.6    Chest pain R07.9    Weakness R53.1     Past Medical History:   Diagnosis Date    Arthritis     Chronic obstructive pulmonary disease (HCC)     hx pe    Coagulation disorder ([facility])     clotting disorder    Hx of colonic polyps     HX OTHER MEDICAL     pulmonary embolism x 2    HX OTHER MEDICAL     PCOS    HX OTHER MEDICAL     Heart monitor 2015     Hypertension     no longer on meds since gastric bypass    Long term (current) use of anticoagulants     Osteopenia     PE (pulmonary thromboembolism) ([address]     Plantar fasciitis of left foot     Tendinopathy Oct 2013    Right insertional Achilles tendinopathy    Thromboembolus ([address]     blood clot in left leg then lung in , then clot behind right knee     Thromboembolus ([address]     also PE    Thyroid disease      Social History     Socioeconomic History    Marital status:    Tobacco Use    Smoking status: Former     Packs/day: 0.00     Years: 0.00     Pack years: 0.00     Types: Cigarettes     Quit date: 2021     Years since quittin.2    Smokeless tobacco: Never    Tobacco comments:     vapor pen   Substance and Sexual Activity    Alcohol use: No     Alcohol/week: 3.3 standard drinks     Types: 2 Glasses of wine, 2 Standard drinks or equivalent per week     Comment: once weekly; quit  Drug use: No     Family History   Problem Relation Age of Onset    Diabetes Other     Hypertension Other     Heart Disease Other     OSTEOARTHRITIS Other     Stroke Mother 66    Stroke Father 36    Heart Attack Neg Hx         Review of Systems   Constitutional:  Negative for chills, fever, malaise/fatigue and weight loss. Eyes:  Negative for blurred vision. Respiratory:  Negative for cough, shortness of breath and wheezing. Cardiovascular:  Positive for leg swelling. Negative for chest pain. Gastrointestinal:  Negative for nausea and vomiting. Musculoskeletal:  Negative for myalgias. Skin:  Negative for rash. Neurological:  Negative for weakness. Psychiatric/Behavioral: Negative. Vitals:    08/08/22 1129   BP: 128/82   Pulse: 60   Resp: 10   Temp: 98.1 °F (36.7 °C)   SpO2: 99%   Weight: 256 lb (116.1 kg)   Height: 5' 10\" (1.778 m)   PainSc:   5   LMP: 12/31/2013       Physical Exam  Vitals and nursing note reviewed. Constitutional:       Appearance: Normal appearance. She is obese. Cardiovascular:      Rate and Rhythm: Normal rate and regular rhythm. Pulses: Normal pulses. Heart sounds: Normal heart sounds. No murmur heard. Pulmonary:      Effort: Pulmonary effort is normal.      Breath sounds: Normal breath sounds. Musculoskeletal:         General: Normal range of motion. Cervical back: Normal range of motion and neck supple. Right lower leg: Edema present. Left lower leg: Edema present. Skin:     General: Skin is warm and dry. Neurological:      Mental Status: She is alert. Psychiatric:         Mood and Affect: Mood normal.         Behavior: Behavior normal.         Thought Content: Thought content normal.         Judgment: Judgment normal.       Assessment/Plan      ICD-10-CM ICD-9-CM    1. Localized edema  R60.0 782.3 furosemide (LASIX) 40 mg tablet      2. Welcome to Medicare preventive visit  Z00.00 V70.0       3.  Morbid obesity ([facility]  E66.01 278.01 carboxymethylcellulose-citric (Plenity) 0.75 gram cap        Follow-up and Dispositions    Return in about 3 months (around 11/8/2022). I have discussed the diagnosis with the patient and the intended plan of care as seen in the above orders. The patient has received an after-visit summary and questions were answered concerning future plans. I have discussed medication, side effects, and warnings with the patient in detail. The patient verbalized understanding and is in agreement with the plan of care. The patient will contact the office with any additional concerns.     lab results and schedule of future lab studies reviewed with patient    Zack Fontana MD

## 2022-08-09 ENCOUNTER — APPOINTMENT (OUTPATIENT)
Dept: PHYSICAL THERAPY | Age: 62
End: 2022-08-09
Attending: FAMILY MEDICINE

## 2022-08-16 ENCOUNTER — APPOINTMENT (OUTPATIENT)
Dept: PHYSICAL THERAPY | Age: 62
End: 2022-08-16
Attending: FAMILY MEDICINE

## 2022-08-23 ENCOUNTER — APPOINTMENT (OUTPATIENT)
Dept: PHYSICAL THERAPY | Age: 62
End: 2022-08-23
Attending: FAMILY MEDICINE

## 2022-08-30 ENCOUNTER — APPOINTMENT (OUTPATIENT)
Dept: PHYSICAL THERAPY | Age: 62
End: 2022-08-30
Attending: FAMILY MEDICINE

## 2022-09-02 ENCOUNTER — TELEPHONE (OUTPATIENT)
Dept: FAMILY MEDICINE CLINIC | Age: 62
End: 2022-09-02

## 2022-09-02 DIAGNOSIS — R60.0 LOCALIZED EDEMA: ICD-10-CM

## 2022-09-02 NOTE — TELEPHONE ENCOUNTER
Received a fax from [id number] requesting a 90 Day Supply on Furosemide 40  MG Tablet Qty 180, however on 08/08/22 at QTY of 60 Tablets was sent in with 1 Refill, Please review and advise.

## 2022-09-06 RX ORDER — FUROSEMIDE 40 MG/1
40 TABLET ORAL 2 TIMES DAILY
Qty: 180 TABLET | Refills: 0 | Status: SHIPPED | OUTPATIENT
Start: 2022-09-06 | End: 2022-09-12 | Stop reason: SDUPTHER

## 2022-09-10 DIAGNOSIS — F51.01 PRIMARY INSOMNIA: ICD-10-CM

## 2022-09-10 DIAGNOSIS — G25.81 RESTLESS LEG: ICD-10-CM

## 2022-09-10 DIAGNOSIS — E03.9 HYPOTHYROIDISM, UNSPECIFIED TYPE: ICD-10-CM

## 2022-09-10 DIAGNOSIS — R60.0 LOCALIZED EDEMA: ICD-10-CM

## 2022-09-12 RX ORDER — TRAZODONE HYDROCHLORIDE 100 MG/1
100 TABLET ORAL
Qty: 90 TABLET | Refills: 0 | Status: SHIPPED | OUTPATIENT
Start: 2022-09-12

## 2022-09-12 RX ORDER — CYCLOBENZAPRINE HCL 10 MG
TABLET ORAL
Qty: 40 TABLET | Refills: 1 | Status: SHIPPED | OUTPATIENT
Start: 2022-09-12 | End: 2022-10-27

## 2022-09-12 RX ORDER — FUROSEMIDE 40 MG/1
TABLET ORAL
Qty: 40 TABLET | Refills: 1 | Status: SHIPPED | OUTPATIENT
Start: 2022-09-12

## 2022-09-12 RX ORDER — LEVOTHYROXINE SODIUM 137 UG/1
TABLET ORAL
Qty: 90 TABLET | Refills: 0 | Status: SHIPPED | OUTPATIENT
Start: 2022-09-12

## 2022-09-14 NOTE — PROGRESS NOTES
[facility] at [address] [facility], [address] [place] [address]  Phone: [phone]      Fax:  [phone]    Discharge Summary    Patient name: Leo Ceballos Start of Care: 2022   Referral source: Negro Obrien MD : 1960               Medical Diagnosis: Right knee pain [M25.561]  [facility] Plan: SEAT 4a / Product Type: [employer] [facility] Medicare /  Onset Date:2021, with recent exacerbation on or about 22               Treatment Diagnosis: Right Knee Pain   Prior Hospitalization: see medical history Provider#: [id number]   Medications: Verified on Patient summary List   Comorbidities: Neuropathy, Depression, Thyroid Problems, HTN, Visual Impaired, POTS (Postural Orthostatic Tachycardia Syndrome, Vascular Disease   Prior Level of Function: Less swelling, redness and pain in (B) Legs           Visits from Start of Care: 10    Missed Visits: 3    Reporting Period : 22 to 22      Long Term Goals: To be accomplished in 10 treatments:  1. Pt will report 50% improvement or better with right knee dysfunction to show a significant increase in ability to tolerate ADL             PN Status: Not met, patient reports 20% improvement thus far              Current Status: Not Met, Progressing at 20%  2. Pt will have a negative right slump test to show decreased neural tension of the right LE and progress to increased activity tolerance with little difficulty             PN Status: Continued right slump test             Current Status:  Not Met, Increased ease of motion with decreased neural tension right LE   3.   Pt will demonstrate the ability to lift and carry 10# each hand up and down 8 steps with a reciprocal gait pattern and little difficulty for progress to carrying items up and down her stair with little difficulty             PN Status:  continued difficulty carrying items up and down stairs             Current Status: Not Met, NT, difficulty going up and down stairs without HHA and step to pattern   4. Pt will demonstrate stair negotiation for 20 6\" steps with a reciprocal gait patter for carryover to walking her stairs at home with little to no difficulty               PN Status:  Continued step to gait patter with stairs             Current Status: Not Met, Periodic reciprocal gait but mostly step to gait, tolerated 16 6\" steps with HHA           Assessment/ Summary of Care:  Patient has shown some  progress with this treatment program. Pain as of last visit was 3/10. Patient's pain has been fluctuating, but shows some improved strength and mobility. Patient continues to report 20% improvement with overall involvement. FOTO score is 50. Patient reports \"going backwards\" in progress, however patient has not been seen since June 27 d/t insurance and family obligations. Patient using SPC full time now d/t increased pain.          RECOMMENDATIONS:  [x]Discontinue therapy: []Patient has reached or is progressing toward set goals      [x]Patient is non-compliant or has abdicated due to limited ability to attend consistent treatments      []Due to lack of appreciable progress towards set goals    Leslie Barnett, PT 9/14/2022 1:05 PM

## 2022-09-16 LAB
ANION GAP SERPL CALC-SCNC: 9 MMOL/L (ref 3–15)
BUN SERPL-MCNC: 24 MG/DL (ref 6–22)
CALCIUM SERPL-MCNC: 9.3 MG/DL (ref 8.4–10.5)
CHLORIDE SERPL-SCNC: 105 MMOL/L (ref 98–110)
CHOLEST SERPL-MCNC: 185 MG/DL (ref 110–200)
CO2 SERPL-SCNC: 29 MMOL/L (ref 20–32)
CREAT SERPL-MCNC: 0.9 MG/DL (ref 0.8–1.4)
GLOMERULAR FILTRATION RATE: >60 ML/MIN/1.73 SQ.M.
GLUCOSE SERPL-MCNC: 91 MG/DL (ref 70–99)
HDLC SERPL-MCNC: 2.6 MG/DL (ref 0–5)
HDLC SERPL-MCNC: 71 MG/DL
LDL/HDL RATIO,LDHD: 1.4
LDLC SERPL CALC-MCNC: 99 MG/DL (ref 50–99)
NON-HDL CHOLESTEROL, 011976: 114 MG/DL
POTASSIUM SERPL-SCNC: 4.1 MMOL/L (ref 3.5–5.5)
SODIUM SERPL-SCNC: 143 MMOL/L (ref 133–145)
TRIGL SERPL-MCNC: 76 MG/DL (ref 40–149)
VLDLC SERPL CALC-MCNC: 15 MG/DL (ref 8–30)

## 2022-09-22 ENCOUNTER — OFFICE VISIT (OUTPATIENT)
Dept: FAMILY MEDICINE CLINIC | Age: 62
End: 2022-09-22
Payer: MEDICARE

## 2022-09-22 VITALS
WEIGHT: 257 LBS | TEMPERATURE: 97.8 F | SYSTOLIC BLOOD PRESSURE: 110 MMHG | BODY MASS INDEX: 36.88 KG/M2 | HEART RATE: 80 BPM | DIASTOLIC BLOOD PRESSURE: 72 MMHG | OXYGEN SATURATION: 98 % | RESPIRATION RATE: 14 BRPM

## 2022-09-22 DIAGNOSIS — I10 PRIMARY HYPERTENSION: ICD-10-CM

## 2022-09-22 DIAGNOSIS — E66.01 MORBID OBESITY (HCC): ICD-10-CM

## 2022-09-22 DIAGNOSIS — E66.01 CLASS 2 SEVERE OBESITY DUE TO EXCESS CALORIES WITH SERIOUS COMORBIDITY IN ADULT, UNSPECIFIED BMI (HCC): ICD-10-CM

## 2022-09-22 DIAGNOSIS — G25.81 RESTLESS LEG: ICD-10-CM

## 2022-09-22 DIAGNOSIS — Z01.818 PRE-OP EXAM: Primary | ICD-10-CM

## 2022-09-22 DIAGNOSIS — E03.8 OTHER SPECIFIED HYPOTHYROIDISM: ICD-10-CM

## 2022-09-22 DIAGNOSIS — Z23 ENCOUNTER FOR IMMUNIZATION: ICD-10-CM

## 2022-09-22 PROCEDURE — 90471 IMMUNIZATION ADMIN: CPT | Performed by: FAMILY MEDICINE

## 2022-09-22 PROCEDURE — 3017F COLORECTAL CA SCREEN DOC REV: CPT | Performed by: FAMILY MEDICINE

## 2022-09-22 PROCEDURE — G8427 DOCREV CUR MEDS BY ELIG CLIN: HCPCS | Performed by: FAMILY MEDICINE

## 2022-09-22 PROCEDURE — G8417 CALC BMI ABV UP PARAM F/U: HCPCS | Performed by: FAMILY MEDICINE

## 2022-09-22 PROCEDURE — G8510 SCR DEP NEG, NO PLAN REQD: HCPCS | Performed by: FAMILY MEDICINE

## 2022-09-22 PROCEDURE — G8754 DIAS BP LESS 90: HCPCS | Performed by: FAMILY MEDICINE

## 2022-09-22 PROCEDURE — 99214 OFFICE O/P EST MOD 30 MIN: CPT | Performed by: FAMILY MEDICINE

## 2022-09-22 PROCEDURE — G9899 SCRN MAM PERF RSLTS DOC: HCPCS | Performed by: FAMILY MEDICINE

## 2022-09-22 PROCEDURE — 90686 IIV4 VACC NO PRSV 0.5 ML IM: CPT | Performed by: FAMILY MEDICINE

## 2022-09-22 PROCEDURE — G0008 ADMIN INFLUENZA VIRUS VAC: HCPCS | Performed by: FAMILY MEDICINE

## 2022-09-22 PROCEDURE — G8752 SYS BP LESS 140: HCPCS | Performed by: FAMILY MEDICINE

## 2022-09-22 RX ORDER — SEMAGLUTIDE 1.34 MG/ML
0.5 INJECTION, SOLUTION SUBCUTANEOUS
Qty: 1 BOX | Refills: 3 | Status: SHIPPED | OUTPATIENT
Start: 2022-09-22

## 2022-09-22 RX ORDER — GABAPENTIN 300 MG/1
300 CAPSULE ORAL 3 TIMES DAILY
COMMUNITY
Start: 2022-09-12

## 2022-09-22 NOTE — PROGRESS NOTES
Ramsey Ball is a 65 y.o. female  presents for pre op for right foot surgery. No other sxs. No Known Allergies  Outpatient Medications Marked as Taking for the 9/22/22 encounter (Office Visit) with Heriberto Kenney MD   Medication Sig Dispense Refill    gabapentin (NEURONTIN) 300 mg capsule Take 300 mg by mouth three (3) times daily. cyclobenzaprine (FLEXERIL) 10 mg tablet TAKE 1 TABLET BY MOUTH TWICE A DAY 40 Tablet 1    traZODone (DESYREL) 100 mg tablet TAKE 1 TABLET BY MOUTH NIGHTLY 90 Tablet 0    levothyroxine (SYNTHROID) 137 mcg tablet TAKE 1 TABLET BY MOUTH EVERY DAY BEFORE BREAKFAST 90 Tablet 0    furosemide (LASIX) 40 mg tablet TAKE 1 TABLET BY MOUTH EVERY DAY 40 Tablet 1    carboxymethylcellulose-citric (Plenity) 0.75 gram cap Take 0.75 g by mouth daily as needed for PRN Reason (Other) (weight loss). 100 Capsule 1    metoprolol tartrate (LOPRESSOR) 25 mg tablet 12.5 mg = 0.5 tab each dose, PO, bid, # 30 tab, 4 Refills, Pharmacy: [facility]/pharmacy #[id number] Tablet 3    rivaroxaban (Xarelto) 20 mg tab tablet TAKE 1 TABLET BY MOUTH EVERY DAY IN THE MORNING WITH BREAKFAST 30 Tablet 3    sertraline (ZOLOFT) 50 mg tablet Take 1 Tablet by mouth daily. 90 Tablet 1    pramipexole (Mirapex) 1 mg tablet Take 1 Tablet by mouth two (2) times a day. 2 tabs daily 60 Tablet 2    B.infantis-B.ani-B.long-B.bifi (Probiotic 4X) 10-15 mg TbEC Take  by mouth. vit B complex-C-folic ac-zinc 395 mcg- 12.5 mg tab Take  by mouth.      calcium citrate-vitamin d3 (CITRACAL+D) 315-200 mg-unit tab Take 1 Tab by mouth daily (with breakfast). melatonin 10 mg tab Take  by mouth. 6mg-9 mg as needed      comprs. stocking,thigh,long,med (COMP. STOCKING,THIGH,LONG,MED.) misc 2 Packages by Does Not Apply route daily as needed (edema). Remove when lying down 2 Package 0    multivitamin (ONE A DAY) tablet Take 1 Tab by mouth two (2) times a day.       Cholecalciferol, Vitamin D3, 25 mcg (1,000 unit) chew Take 2,000 Units by mouth daily. Patient Active Problem List   Diagnosis Code    Hypothyroidism E03.9    DJD (degenerative joint disease) M19.90    DVT (deep venous thrombosis) ([facility]) I82.409    Morbid obesity ([facility]) E66.01    HTN (hypertension) I10    FAINA (stress urinary incontinence, female) N39.3    GERD (gastroesophageal reflux disease) K21.9    [place] on CPAP G47.33, Z99.89    History of pulmonary embolism Z86.711    History of DVT of lower extremity Z86.718    Bradycardia R00.1    History of gastric bypass Z98.84    Palpitations R00.2    History of hypertension Z86.79    POTS (postural orthostatic tachycardia syndrome) I49.8    Tobacco abuse counseling Z71.6    Chest pain R07.9    Weakness R53.1     Past Medical History:   Diagnosis Date    Arthritis     Chronic obstructive pulmonary disease (HCC)     hx pe    Coagulation disorder ([facility])     clotting disorder    Hx of colonic polyps     HX OTHER MEDICAL     pulmonary embolism x 2    HX OTHER MEDICAL     PCOS    HX OTHER MEDICAL     Heart monitor 2015     Hypertension     no longer on meds since gastric bypass    Long term (current) use of anticoagulants     Osteopenia     PE (pulmonary thromboembolism) ([address]     Plantar fasciitis of left foot     Tendinopathy Oct 2013    Right insertional Achilles tendinopathy    Thromboembolus ([address]     blood clot in left leg then lung in , then clot behind right knee     Thromboembolus ([address]     also PE    Thyroid disease      Social History     Socioeconomic History    Marital status:    Tobacco Use    Smoking status: Former     Packs/day: 0.00     Years: 0.00     Pack years: 0.00     Types: Cigarettes     Quit date: 2021     Years since quittin.3    Smokeless tobacco: Never    Tobacco comments:     vapor pen   Substance and Sexual Activity    Alcohol use: No     Alcohol/week: 3.3 standard drinks     Types: 2 Glasses of wine, 2 Standard drinks or equivalent per week     Comment: once weekly; quit 2018    Drug use:  No Family History   Problem Relation Age of Onset    Diabetes Other     Hypertension Other     Heart Disease Other     OSTEOARTHRITIS Other     Stroke Mother 66    Stroke Father 36    Heart Attack Neg Hx         Review of Systems   Constitutional:  Negative for chills, fever, malaise/fatigue and weight loss. Eyes:  Negative for blurred vision. Respiratory:  Negative for cough, shortness of breath and wheezing. Cardiovascular:  Negative for chest pain. Gastrointestinal:  Negative for nausea and vomiting. Musculoskeletal:  Negative for myalgias. Skin:  Negative for rash. Neurological:  Negative for weakness. Psychiatric/Behavioral: Negative. Vitals:    09/22/22 1036   BP: 110/72   Pulse: 80   Resp: 14   Temp: 97.8 °F (36.6 °C)   TempSrc: Tympanic   SpO2: 98%   Weight: 257 lb (116.6 kg)   PainSc:   2   PainLoc: Generalized   LMP: 12/31/2013       Physical Exam  Vitals and nursing note reviewed. Constitutional:       Appearance: Normal appearance. She is obese. Cardiovascular:      Rate and Rhythm: Normal rate and regular rhythm. Heart sounds: Normal heart sounds. Pulmonary:      Effort: Pulmonary effort is normal.      Breath sounds: Normal breath sounds. Musculoskeletal:         General: Normal range of motion. Cervical back: Normal range of motion and neck supple. Skin:     General: Skin is warm and dry. Neurological:      Mental Status: She is alert. Psychiatric:         Mood and Affect: Mood normal.         Behavior: Behavior normal.         Thought Content: Thought content normal.         Judgment: Judgment normal.       Assessment/Plan      ICD-10-CM ICD-9-CM    1. Pre-op exam  Z01.818 V72.84       2. Other specified hypothyroidism  E03.8 244.8       3. Restless leg  G25.81 333.94       4. Morbid obesity ([address]  E66.01 278.01       5. Primary hypertension  I10 401.9       6.  Class 2 severe obesity due to excess calories with serious comorbidity in adult, unspecified BMI (HCC)  E66.01 278.01 semaglutide (Ozempic) 0.25 mg or 0.5 mg/dose (2 mg/1.5 ml) subq pen      7. Encounter for immunization  Z23 V03.89 INFLUENZA, FLUARIX, FLULAVAL, FLUZONE (AGE 6 MO+), AFLURIA(AGE 3Y+) IM, PF, 0.5 ML        Patient is cleared for foot surgery    I have discussed the diagnosis with the patient and the intended plan of care as seen in the above orders. The patient has received an after-visit summary and questions were answered concerning future plans. I have discussed medication, side effects, and warnings with the patient in detail. The patient verbalized understanding and is in agreement with the plan of care. The patient will contact the office with any additional concerns.       lab results and schedule of future lab studies reviewed with patient    Brenton Corral MD

## 2022-09-22 NOTE — PROGRESS NOTES
Chief Complaint   Patient presents with    Pre-op Exam    Immunization/Injection       Patient here today for surgical clearance for bone spurs on her right foot. She would like to get her flu vaccine today. 1. \"Have you been to the ER, urgent care clinic since your last visit? Hospitalized since your last visit? \" No    2. \"Have you seen or consulted any other health care providers outside of the [address] [place] [address] since your last visit? \" No     3. For patients aged 39-70: Has the patient had a colonoscopy / FIT/ Cologuard? Yes - no Care Gap present      If the patient is female:    4. For patients aged 41-77: Has the patient had a mammogram within the past 2 years? Yes - no Care Gap present      5. For patients aged 21-65: Has the patient had a pap smear?  No

## 2022-10-10 ENCOUNTER — OFFICE VISIT (OUTPATIENT)
Dept: FAMILY MEDICINE CLINIC | Age: 62
End: 2022-10-10
Payer: MEDICARE

## 2022-10-10 VITALS
OXYGEN SATURATION: 100 % | HEART RATE: 75 BPM | DIASTOLIC BLOOD PRESSURE: 72 MMHG | BODY MASS INDEX: 37.16 KG/M2 | TEMPERATURE: 97.6 F | WEIGHT: 259 LBS | RESPIRATION RATE: 14 BRPM | SYSTOLIC BLOOD PRESSURE: 100 MMHG

## 2022-10-10 DIAGNOSIS — J11.00 INFLUENZA AND PNEUMONIA: ICD-10-CM

## 2022-10-10 DIAGNOSIS — I10 PRIMARY HYPERTENSION: Primary | ICD-10-CM

## 2022-10-10 PROCEDURE — G8427 DOCREV CUR MEDS BY ELIG CLIN: HCPCS | Performed by: FAMILY MEDICINE

## 2022-10-10 PROCEDURE — 3017F COLORECTAL CA SCREEN DOC REV: CPT | Performed by: FAMILY MEDICINE

## 2022-10-10 PROCEDURE — G8754 DIAS BP LESS 90: HCPCS | Performed by: FAMILY MEDICINE

## 2022-10-10 PROCEDURE — G8417 CALC BMI ABV UP PARAM F/U: HCPCS | Performed by: FAMILY MEDICINE

## 2022-10-10 PROCEDURE — G9899 SCRN MAM PERF RSLTS DOC: HCPCS | Performed by: FAMILY MEDICINE

## 2022-10-10 PROCEDURE — 99214 OFFICE O/P EST MOD 30 MIN: CPT | Performed by: FAMILY MEDICINE

## 2022-10-10 PROCEDURE — G8752 SYS BP LESS 140: HCPCS | Performed by: FAMILY MEDICINE

## 2022-10-10 PROCEDURE — G8510 SCR DEP NEG, NO PLAN REQD: HCPCS | Performed by: FAMILY MEDICINE

## 2022-10-10 NOTE — PROGRESS NOTES
Chief Complaint   Patient presents with    Hospital Follow Up     Sepsis     Patient here today for follow up on the hospital visit for pneumonia. 1. \"Have you been to the ER, urgent care clinic since your last visit? Hospitalized since your last visit? \"  ER [facility], pneumonia    2. \"Have you seen or consulted any other health care providers outside of the [address] [place] [address] since your last visit? \" No     3. For patients aged 39-70: Has the patient had a colonoscopy / FIT/ Cologuard? Yes - no Care Gap present      If the patient is female:    4. For patients aged 41-77: Has the patient had a mammogram within the past 2 years? Yes - no Care Gap present      5. For patients aged 21-65: Has the patient had a pap smear?  No

## 2022-10-10 NOTE — PROGRESS NOTES
Brenton Perry is a 65 y.o. female  presents for hospital follow up. No chest pains or SOB fever or chills. No Known Allergies  Outpatient Medications Marked as Taking for the 10/10/22 encounter (Office Visit) with Lisa Reyna MD   Medication Sig Dispense Refill    gabapentin (NEURONTIN) 300 mg capsule Take 300 mg by mouth three (3) times daily. semaglutide (Ozempic) 0.25 mg or 0.5 mg/dose (2 mg/1.5 ml) subq pen 0.5 mg by SubCUTAneous route every seven (7) days. 1 Box 3    cyclobenzaprine (FLEXERIL) 10 mg tablet TAKE 1 TABLET BY MOUTH TWICE A DAY 40 Tablet 1    traZODone (DESYREL) 100 mg tablet TAKE 1 TABLET BY MOUTH NIGHTLY 90 Tablet 0    levothyroxine (SYNTHROID) 137 mcg tablet TAKE 1 TABLET BY MOUTH EVERY DAY BEFORE BREAKFAST 90 Tablet 0    carboxymethylcellulose-citric (Plenity) 0.75 gram cap Take 0.75 g by mouth daily as needed for PRN Reason (Other) (weight loss). 100 Capsule 1    rivaroxaban (Xarelto) 20 mg tab tablet TAKE 1 TABLET BY MOUTH EVERY DAY IN THE MORNING WITH BREAKFAST 30 Tablet 3    sertraline (ZOLOFT) 50 mg tablet Take 1 Tablet by mouth daily. 90 Tablet 1    pramipexole (Mirapex) 1 mg tablet Take 1 Tablet by mouth two (2) times a day. 2 tabs daily 60 Tablet 2    B.infantis-B.ani-B.long-B.bifi (Probiotic 4X) 10-15 mg TbEC Take  by mouth. vit B complex-C-folic ac-zinc 641 mcg- 12.5 mg tab Take  by mouth.      calcium citrate-vitamin d3 (CITRACAL+D) 315-200 mg-unit tab Take 1 Tab by mouth daily (with breakfast). melatonin 10 mg tab Take  by mouth. 6mg-9 mg as needed      comprs. stocking,thigh,long,med (COMP. STOCKING,THIGH,LONG,MED.) misc 2 Packages by Does Not Apply route daily as needed (edema). Remove when lying down 2 Package 0    multivitamin (ONE A DAY) tablet Take 1 Tab by mouth two (2) times a day. Cholecalciferol, Vitamin D3, 25 mcg (1,000 unit) chew Take 2,000 Units by mouth daily.        Patient Active Problem List   Diagnosis Code    Hypothyroidism E03.9 DJD (degenerative joint disease) M19.90    DVT (deep venous thrombosis) ([facility]) I82.409    Morbid obesity ([facility]) E66.01    HTN (hypertension) I10    FAINA (stress urinary incontinence, female) N39.3    GERD (gastroesophageal reflux disease) K21.9    [place] on CPAP G47.33, Z99.89    History of pulmonary embolism Z86.711    History of DVT of lower extremity Z86.718    Bradycardia R00.1    History of gastric bypass Z98.84    Palpitations R00.2    History of hypertension Z86.79    POTS (postural orthostatic tachycardia syndrome) G90. A    Tobacco abuse counseling Z71.6    Chest pain R07.9    Weakness R53.1     Past Medical History:   Diagnosis Date    Arthritis     Chronic obstructive pulmonary disease (HCC)     hx pe    Coagulation disorder ([facility])     clotting disorder    Hx of colonic polyps     HX OTHER MEDICAL     pulmonary embolism x 2    HX OTHER MEDICAL     PCOS    HX OTHER MEDICAL     Heart monitor 2015     Hypertension     no longer on meds since gastric bypass    Long term (current) use of anticoagulants     Osteopenia     PE (pulmonary thromboembolism) ([address]     Plantar fasciitis of left foot     Tendinopathy Oct 2013    Right insertional Achilles tendinopathy    Thromboembolus ([address]     blood clot in left leg then lung in , then clot behind right knee     Thromboembolus ([address]     also PE    Thyroid disease      Social History     Socioeconomic History    Marital status:    Tobacco Use    Smoking status: Former     Packs/day: 0.00     Years: 0.00     Pack years: 0.00     Types: Cigarettes     Quit date: 2021     Years since quittin.3    Smokeless tobacco: Never    Tobacco comments:     vapor pen   Substance and Sexual Activity    Alcohol use: No     Alcohol/week: 3.3 standard drinks     Types: 2 Glasses of wine, 2 Standard drinks or equivalent per week     Comment: once weekly; quit 2018    Drug use: No     Family History   Problem Relation Age of Onset    Diabetes Other     Hypertension Other     Heart Disease Other     OSTEOARTHRITIS Other     Stroke Mother 66    Stroke Father 36    Heart Attack Neg Hx         Review of Systems   Constitutional:  Negative for chills, fever, malaise/fatigue and weight loss. Eyes:  Negative for blurred vision. Respiratory:  Negative for cough, shortness of breath and wheezing. Cardiovascular:  Negative for chest pain. Gastrointestinal:  Negative for nausea and vomiting. Musculoskeletal:  Negative for myalgias. Skin:  Negative for rash. Neurological:  Negative for weakness. Psychiatric/Behavioral: Negative. Vitals:    10/10/22 1026   BP: 100/72   Pulse: 75   Resp: 14   Temp: 97.6 °F (36.4 °C)   TempSrc: Tympanic   SpO2: 100%   Weight: 259 lb (117.5 kg)   PainSc:   0 - No pain   LMP: 12/31/2013       Physical Exam  Vitals and nursing note reviewed. Constitutional:       Appearance: Normal appearance. She is obese. Cardiovascular:      Rate and Rhythm: Normal rate and regular rhythm. Pulses: Normal pulses. Heart sounds: Normal heart sounds. Pulmonary:      Effort: Pulmonary effort is normal.      Breath sounds: Normal breath sounds. Musculoskeletal:         General: Normal range of motion. Cervical back: Normal range of motion and neck supple. Skin:     General: Skin is warm and dry. Neurological:      General: No focal deficit present. Mental Status: She is alert and oriented to person, place, and time. Psychiatric:         Mood and Affect: Mood normal.         Behavior: Behavior normal.         Thought Content: Thought content normal.         Judgment: Judgment normal.       Assessment/Plan      ICD-10-CM ICD-9-CM    1. Primary hypertension  I10 401.9       2. Influenza and pneumonia  J11.00 487.0         will contine ozempic lasix and lopressor    I have discussed the diagnosis with the patient and the intended plan of care as seen in the above orders.  The patient has received an after-visit summary and questions were answered concerning future plans. I have discussed medication, side effects, and warnings with the patient in detail. The patient verbalized understanding and is in agreement with the plan of care. The patient will contact the office with any additional concerns.     lab results and schedule of future lab studies reviewed with patient    Aaron Ledesma MD

## 2022-10-14 ENCOUNTER — TELEPHONE (OUTPATIENT)
Dept: FAMILY MEDICINE CLINIC | Age: 62
End: 2022-10-14

## 2022-10-14 DIAGNOSIS — G90.A POTS (POSTURAL ORTHOSTATIC TACHYCARDIA SYNDROME): Primary | ICD-10-CM

## 2022-10-14 NOTE — TELEPHONE ENCOUNTER
Patient is requesting (Updated) referral to the following office:    Speciality: Cardiology  Specialist Name: [facility]  Office Location: [place] [address], [place], [facility] [address]  Phone (if available): [phone]  Fax (if available):   Diagnosis: Pots  Date of appointment (if scheduled):

## 2022-10-17 ENCOUNTER — TELEPHONE (OUTPATIENT)
Dept: FAMILY MEDICINE CLINIC | Age: 62
End: 2022-10-17

## 2022-10-24 DIAGNOSIS — G25.81 RESTLESS LEG: ICD-10-CM

## 2022-10-27 RX ORDER — CYCLOBENZAPRINE HCL 10 MG
TABLET ORAL
Qty: 40 TABLET | Refills: 1 | Status: SHIPPED | OUTPATIENT
Start: 2022-10-27 | End: 2022-11-10 | Stop reason: SDUPTHER

## 2022-11-10 DIAGNOSIS — R42 DIZZINESS: ICD-10-CM

## 2022-11-10 DIAGNOSIS — I82.401 ACUTE DEEP VEIN THROMBOSIS (DVT) OF RIGHT LOWER EXTREMITY, UNSPECIFIED VEIN (HCC): ICD-10-CM

## 2022-11-10 NOTE — TELEPHONE ENCOUNTER
Requested Prescriptions     Pending Prescriptions Disp Refills    comprs. stocking,thigh,long,med (Comp. Stocking,Thigh,Long,Med.) misc 2 Each 1     Si Packages by Does Not Apply route daily as needed (edema).  Remove when lying down

## 2022-11-17 DIAGNOSIS — F32.A ANXIETY AND DEPRESSION: ICD-10-CM

## 2022-11-17 DIAGNOSIS — E66.01 CLASS 2 SEVERE OBESITY DUE TO EXCESS CALORIES WITH SERIOUS COMORBIDITY IN ADULT, UNSPECIFIED BMI (HCC): ICD-10-CM

## 2022-11-17 DIAGNOSIS — F41.9 ANXIETY AND DEPRESSION: ICD-10-CM

## 2022-11-17 RX ORDER — SEMAGLUTIDE 1.34 MG/ML
0.5 INJECTION, SOLUTION SUBCUTANEOUS
Qty: 1 BOX | Refills: 3 | Status: CANCELLED | OUTPATIENT
Start: 2022-11-17

## 2022-11-17 NOTE — TELEPHONE ENCOUNTER
This pharmacy faxed over request for the following prescriptions to be filled:    Requested Prescriptions     Pending Prescriptions Disp Refills    sertraline (ZOLOFT) 50 mg tablet 90 Tablet 1     Sig: Take 1 Tablet by mouth daily. PCP:Dr. Beth Chapman or Print: [facility]  Mail order or Local pharmacy [phone]    Scheduled appointment if not seen by current providers in office:LOV 10/10/22 Upcoming scheduled 12/22/22.

## 2022-11-17 NOTE — TELEPHONE ENCOUNTER
This pharmacy faxed over request for the following prescriptions to be filled:    Medication requested :  Requested Prescriptions     Pending Prescriptions Disp Refills    semaglutide (Ozempic) 0.25 mg or 0.5 mg/dose (2 mg/1.5 ml) subq pen 1 Box 3     Si.5 mg by SubCUTAneous route every seven (7) days.          PCP: Ifrah Laird or [address] [place] [address]  Mail order or Local pharmacy [phone]    Scheduled appointment if not seen by current providers in office:LOV 10/10/22 Upcoming 22

## 2022-11-18 RX ORDER — SERTRALINE HYDROCHLORIDE 50 MG/1
50 TABLET, FILM COATED ORAL DAILY
Qty: 90 TABLET | Refills: 1 | Status: SHIPPED | OUTPATIENT
Start: 2022-11-18

## 2022-12-02 DIAGNOSIS — I82.401 ACUTE DEEP VEIN THROMBOSIS (DVT) OF RIGHT LOWER EXTREMITY, UNSPECIFIED VEIN (HCC): ICD-10-CM

## 2022-12-02 DIAGNOSIS — E03.9 HYPOTHYROIDISM, UNSPECIFIED TYPE: ICD-10-CM

## 2022-12-02 NOTE — TELEPHONE ENCOUNTER
This patient contacted office for the following prescriptions to be filled:    Medication requested :   Requested Prescriptions     Pending Prescriptions Disp Refills    levothyroxine (SYNTHROID) 137 mcg tablet 90 Tablet 0     Sig: Take 1 Tablet by mouth Daily (before breakfast).     rivaroxaban (Xarelto) 20 mg tab tablet 30 Tablet 3     Sig: TAKE 1 TABLET BY MOUTH EVERY DAY IN THE MORNING WITH BREAKFAST      PCP: Dr. Mike Hagen or Print: [facility]  Mail order or Local pharmacy    Scheduled appointment if not seen by current providers in office: [address] [place] [address]  10/10/22, next appt 12/22/22

## 2022-12-06 DIAGNOSIS — E66.01 CLASS 2 SEVERE OBESITY DUE TO EXCESS CALORIES WITH SERIOUS COMORBIDITY IN ADULT, UNSPECIFIED BMI (HCC): ICD-10-CM

## 2022-12-06 NOTE — TELEPHONE ENCOUNTER
This pharmacy faxed over request for the following prescriptions to be filled:    Medication requested :  Requested Prescriptions     Pending Prescriptions Disp Refills    semaglutide (Ozempic) 0.25 mg or 0.5 mg/dose (2 mg/1.5 ml) subq pen 1 Box 3     Si.5 mg by SubCUTAneous route every seven (7) days.        PCP: Dr. Hipolito Goldman or Print: [facility]  Mail order or Local pharmacy [phone]    Scheduled appointment if not seen by current providers in office: LOV 10/10/22 Upcoming 22

## 2022-12-07 RX ORDER — LEVOTHYROXINE SODIUM 137 UG/1
137 TABLET ORAL
Qty: 90 TABLET | Refills: 0 | Status: SHIPPED | OUTPATIENT
Start: 2022-12-07

## 2022-12-07 RX ORDER — SEMAGLUTIDE 1.34 MG/ML
0.5 INJECTION, SOLUTION SUBCUTANEOUS
Qty: 1 BOX | Refills: 3 | Status: SHIPPED | OUTPATIENT
Start: 2022-12-07

## 2022-12-14 DIAGNOSIS — R60.0 LOCALIZED EDEMA: ICD-10-CM

## 2022-12-15 RX ORDER — FUROSEMIDE 40 MG/1
TABLET ORAL
Qty: 60 TABLET | Refills: 1 | Status: SHIPPED | OUTPATIENT
Start: 2022-12-15

## 2022-12-29 DIAGNOSIS — F32.A ANXIETY AND DEPRESSION: ICD-10-CM

## 2022-12-29 DIAGNOSIS — F41.9 ANXIETY AND DEPRESSION: ICD-10-CM

## 2022-12-29 RX ORDER — SERTRALINE HYDROCHLORIDE 50 MG/1
TABLET, FILM COATED ORAL
Qty: 90 TABLET | Refills: 1 | Status: SHIPPED | OUTPATIENT
Start: 2022-12-29

## 2022-12-30 DIAGNOSIS — R60.0 LOCALIZED EDEMA: ICD-10-CM

## 2023-01-01 DIAGNOSIS — G25.81 RESTLESS LEG: ICD-10-CM

## 2023-01-03 RX ORDER — CYCLOBENZAPRINE HCL 10 MG
TABLET ORAL
Qty: 40 TABLET | Refills: 1 | Status: SHIPPED | OUTPATIENT
Start: 2023-01-03

## 2023-01-03 RX ORDER — FUROSEMIDE 40 MG/1
TABLET ORAL
Qty: 80 TABLET | Refills: 3 | Status: SHIPPED | OUTPATIENT
Start: 2023-01-03

## 2023-01-23 LAB
AVERAGE GLUCOSE: 108
CREATININE, EXTERNAL: 0.8
HBA1C MFR BLD: 5.4 %
LDL CHOLESTEROL, EXTERNAL: 93

## 2023-02-13 ENCOUNTER — TELEPHONE (OUTPATIENT)
Facility: CLINIC | Age: 63
End: 2023-02-13

## 2023-02-13 DIAGNOSIS — I82.401 ACUTE EMBOLISM AND THROMBOSIS OF UNSPECIFIED DEEP VEINS OF RIGHT LOWER EXTREMITY (HCC): Primary | ICD-10-CM

## 2023-02-13 NOTE — TELEPHONE ENCOUNTER
Received a fax from [facility]  requesting a refill for Xarelto. Please fax to [phone], Review and advise.

## 2023-02-15 ENCOUNTER — TELEPHONE (OUTPATIENT)
Facility: CLINIC | Age: 63
End: 2023-02-15

## 2023-02-15 DIAGNOSIS — E03.8 OTHER SPECIFIED HYPOTHYROIDISM: Primary | ICD-10-CM

## 2023-02-17 RX ORDER — LEVOTHYROXINE SODIUM 137 UG/1
137 TABLET ORAL DAILY
Qty: 90 TABLET | Refills: 0 | Status: SHIPPED | OUTPATIENT
Start: 2023-02-17

## 2023-02-21 DIAGNOSIS — G25.81 RESTLESS LEGS SYNDROME: ICD-10-CM

## 2023-02-21 RX ORDER — CYCLOBENZAPRINE HCL 10 MG
TABLET ORAL
Qty: 40 TABLET | Refills: 1 | Status: SHIPPED | OUTPATIENT
Start: 2023-02-21

## 2023-03-05 DIAGNOSIS — R60.0 LOCALIZED EDEMA: ICD-10-CM

## 2023-03-22 RX ORDER — FUROSEMIDE 40 MG/1
TABLET ORAL
Qty: 60 TABLET | Refills: 1 | OUTPATIENT
Start: 2023-03-22

## 2023-05-24 ENCOUNTER — TELEPHONE (OUTPATIENT)
Facility: CLINIC | Age: 63
End: 2023-05-24

## 2023-06-05 NOTE — TELEPHONE ENCOUNTER
This pharmacy faxed over request for the following prescriptions to be filled:    Medication requested :   Requested Prescriptions     Pending Prescriptions Disp Refills    levothyroxine (SYNTHROID) 137 MCG tablet [Pharmacy Med Name: LEVOTHYROXINE 137 MCG TABLET] 90 tablet      Sig: TAKE 1 TABLET BY MOUTH EVERY DAY BEFORE BREAKFAST      PCP: Dr. Purvis Matter or Print: [facility]  Mail order or Local pharmacy: [phone]    Scheduled appointment if not seen by current providers in office: LOV  1010/22    I called patient to scheduled follow up but no answer and unable to leave v/m. I also called 5/24 for appt (see 5/24 encounter) for a surgical clearance also. I will send a letter.

## 2023-06-09 RX ORDER — LEVOTHYROXINE SODIUM 137 UG/1
TABLET ORAL
Qty: 90 TABLET | OUTPATIENT
Start: 2023-06-09

## 2023-07-05 DIAGNOSIS — F41.9 ANXIETY DISORDER, UNSPECIFIED: ICD-10-CM

## 2023-07-06 NOTE — TELEPHONE ENCOUNTER
Patient was last seen on 10/10/22 no follow up plan indicated requesting refills. Please review and sign if appropriate.

## 2023-10-09 RX ORDER — LEVOTHYROXINE SODIUM 137 UG/1
137 TABLET ORAL
Qty: 90 TABLET | OUTPATIENT
Start: 2023-10-09